# Patient Record
Sex: FEMALE | Race: BLACK OR AFRICAN AMERICAN | Employment: FULL TIME | RURAL
[De-identification: names, ages, dates, MRNs, and addresses within clinical notes are randomized per-mention and may not be internally consistent; named-entity substitution may affect disease eponyms.]

---

## 2017-01-03 RX ORDER — PRAVASTATIN SODIUM 10 MG/1
TABLET ORAL
Qty: 90 TAB | Refills: 1 | Status: SHIPPED | OUTPATIENT
Start: 2017-01-03 | End: 2017-08-21 | Stop reason: SDUPTHER

## 2017-03-14 NOTE — TELEPHONE ENCOUNTER
Please call patient to schedule office visit with labs has not been seen recently and is requesting refills.

## 2017-03-17 RX ORDER — AMLODIPINE BESYLATE AND BENAZEPRIL HYDROCHLORIDE 10; 40 MG/1; MG/1
CAPSULE ORAL
Qty: 90 CAP | Refills: 0 | Status: SHIPPED | OUTPATIENT
Start: 2017-03-17 | End: 2017-06-16 | Stop reason: SDUPTHER

## 2017-04-06 ENCOUNTER — TELEPHONE (OUTPATIENT)
Dept: FAMILY MEDICINE CLINIC | Age: 47
End: 2017-04-06

## 2017-04-06 NOTE — TELEPHONE ENCOUNTER
We have to move patient's appointment due to Marcelino Renteria being out. Would Dr. Ezequiel Lam allow us to use a \"z\" spot on Monday, April 10, at 4:30 p.m. to see this lady for medication refills? Patient just started a new job which starts at 7 a.m. and ends at 4:00 p.m.

## 2017-04-10 ENCOUNTER — OFFICE VISIT (OUTPATIENT)
Dept: FAMILY MEDICINE CLINIC | Age: 47
End: 2017-04-10

## 2017-04-10 VITALS
WEIGHT: 177.2 LBS | HEART RATE: 76 BPM | OXYGEN SATURATION: 99 % | SYSTOLIC BLOOD PRESSURE: 104 MMHG | RESPIRATION RATE: 16 BRPM | DIASTOLIC BLOOD PRESSURE: 70 MMHG | BODY MASS INDEX: 30.25 KG/M2 | HEIGHT: 64 IN

## 2017-04-10 DIAGNOSIS — G43.919 INTRACTABLE MIGRAINE, UNSPECIFIED MIGRAINE TYPE: Primary | ICD-10-CM

## 2017-04-10 RX ORDER — TOPIRAMATE 50 MG/1
TABLET, FILM COATED ORAL
Qty: 180 TAB | Refills: 0 | Status: SHIPPED | OUTPATIENT
Start: 2017-04-10 | End: 2017-07-28 | Stop reason: SDUPTHER

## 2017-05-15 RX ORDER — HYDROCHLOROTHIAZIDE 25 MG/1
25 TABLET ORAL DAILY
Qty: 90 TAB | Refills: 0 | Status: SHIPPED | OUTPATIENT
Start: 2017-05-15 | End: 2017-08-20 | Stop reason: SDUPTHER

## 2017-06-16 RX ORDER — AMLODIPINE BESYLATE AND BENAZEPRIL HYDROCHLORIDE 10; 40 MG/1; MG/1
CAPSULE ORAL
Qty: 90 CAP | Refills: 0 | Status: SHIPPED | OUTPATIENT
Start: 2017-06-16 | End: 2017-06-17 | Stop reason: SDUPTHER

## 2017-06-17 RX ORDER — AMLODIPINE BESYLATE AND BENAZEPRIL HYDROCHLORIDE 10; 40 MG/1; MG/1
CAPSULE ORAL
Qty: 90 CAP | Refills: 0 | Status: SHIPPED | OUTPATIENT
Start: 2017-06-17 | End: 2018-03-26 | Stop reason: SDUPTHER

## 2017-06-20 RX ORDER — AMLODIPINE BESYLATE 10 MG/1
10 TABLET ORAL DAILY
Qty: 90 TAB | Refills: 3 | OUTPATIENT
Start: 2017-06-20

## 2017-06-20 RX ORDER — AMLODIPINE BESYLATE AND BENAZEPRIL HYDROCHLORIDE 10; 40 MG/1; MG/1
CAPSULE ORAL
Qty: 90 CAP | Refills: 0 | OUTPATIENT
Start: 2017-06-20

## 2017-06-20 RX ORDER — BENAZEPRIL HYDROCHLORIDE 40 MG/1
40 TABLET ORAL DAILY
Qty: 90 TAB | Refills: 3 | OUTPATIENT
Start: 2017-06-20

## 2017-07-28 DIAGNOSIS — G43.919 INTRACTABLE MIGRAINE, UNSPECIFIED MIGRAINE TYPE: ICD-10-CM

## 2017-07-28 RX ORDER — TOPIRAMATE 50 MG/1
TABLET, FILM COATED ORAL
Qty: 180 TAB | Refills: 0 | Status: SHIPPED | OUTPATIENT
Start: 2017-07-28 | End: 2017-11-06 | Stop reason: SDUPTHER

## 2017-08-21 RX ORDER — PRAVASTATIN SODIUM 10 MG/1
TABLET ORAL
Qty: 30 TAB | Refills: 0 | Status: SHIPPED | OUTPATIENT
Start: 2017-08-21 | End: 2017-09-26 | Stop reason: SDUPTHER

## 2017-09-26 ENCOUNTER — OFFICE VISIT (OUTPATIENT)
Dept: FAMILY MEDICINE CLINIC | Age: 47
End: 2017-09-26

## 2017-09-26 VITALS
BODY MASS INDEX: 30.67 KG/M2 | OXYGEN SATURATION: 99 % | SYSTOLIC BLOOD PRESSURE: 120 MMHG | DIASTOLIC BLOOD PRESSURE: 70 MMHG | WEIGHT: 179.65 LBS | HEART RATE: 83 BPM | HEIGHT: 64 IN | RESPIRATION RATE: 16 BRPM | TEMPERATURE: 98.8 F

## 2017-09-26 DIAGNOSIS — R68.89 FLU-LIKE SYMPTOMS: Primary | ICD-10-CM

## 2017-09-26 LAB
QUICKVUE INFLUENZA TEST: NEGATIVE
VALID INTERNAL CONTROL?: YES

## 2017-09-26 RX ORDER — SCOLOPAMINE TRANSDERMAL SYSTEM 1 MG/1
1.5 PATCH, EXTENDED RELEASE TRANSDERMAL
Qty: 6 PATCH | Refills: 1 | Status: SHIPPED | OUTPATIENT
Start: 2017-09-26 | End: 2017-11-21 | Stop reason: ALTCHOICE

## 2017-09-26 RX ORDER — PRAVASTATIN SODIUM 10 MG/1
TABLET ORAL
Qty: 30 TAB | Refills: 0 | Status: SHIPPED | OUTPATIENT
Start: 2017-09-26 | End: 2017-11-06 | Stop reason: SDUPTHER

## 2017-09-26 NOTE — LETTER
NOTIFICATION RETURN TO WORK / SCHOOL 2017 Re: Lovely Lopez : 1970 To Whom It May Concern: 
 
Angela Jj is currently under the care of [address] [place] [address]. She is excused from work Monday, 2017, through Wednesday, 2017. If there are questions or concerns please have the patient contact our office.  
 
Sincerely, 
 
 
Emani Calle NP

## 2017-09-26 NOTE — MR AVS SNAPSHOT
Visit Information Date & Time Provider Department Dept. Phone Encounter #  
 9/26/2017 11:00 AM Mario Alberto Garcia, NP [address] [phone] [id number] Upcoming Health Maintenance Date Due Pneumococcal 19-64 Medium Risk (1 of 1 - PPSV23) 9/23/1990 INFLUENZA AGE 9 TO ADULT 8/1/2017 DTaP/Tdap/Td series (2 - Td) 5/20/2026 Allergies as of 9/26/2017  Review Complete On: 9/26/2017 By: Carie Carter RN Severity Noted Reaction Type Reactions Dilaudid [Hydromorphone (Bulk)] High 07/18/2014   Systemic Unknown (comments) Respiratory suppression leading to intubation Current Immunizations  Reviewed on 5/20/2016 Name Date Influenza Vaccine  Deferred (Patient Refused) Tdap 5/20/2016 Not reviewed this visit You Were Diagnosed With   
  
 Codes Comments Flu-like symptoms    -  Primary ICD-10-CM: R68.89 ICD-9-CM: 780.99 Vitals BP Pulse Temp Resp Height(growth percentile) Weight(growth percentile) 120/70 (BP 1 Location: Left arm, BP Patient Position: Sitting) 83 98.8 °F (37.1 °C) (Oral) 16 5' 4\" (1.626 m) 179 lb 10.4 oz (81.5 kg) SpO2 BMI OB Status Smoking Status 99% 30.84 kg/m2 Hysterectomy Current Every Day Smoker Vitals History BMI and BSA Data Body Mass Index Body Surface Area  
 30.84 kg/m 2 1.92 m 2 Preferred Pharmacy Pharmacy Name Phone [facility] [place] , [place] - [address] [phone] Your Updated Medication List  
  
   
This list is accurate as of: 9/26/17 12:53 PM.  Always use your most recent med list. amLODIPine-benazepril 10-40 mg per capsule Commonly known as:  LOTREL  
TAKE ONE CAPSULE BY MOUTH ONCE DAILY  
  
 aspirin 81 mg chewable tablet Take 1 Tab by mouth daily. CENTRUM PO Take 1 Tab by mouth daily. hydroCHLOROthiazide 25 mg tablet Commonly known as:  HYDRODIURIL  
 TAKE ONE TABLET BY MOUTH ONCE DAILY **NEEDS  TO  BE  RECHECK**  
  
 levothyroxine 100 mcg tablet Commonly known as:  SYNTHROID Take 1 Tab by mouth Daily (before breakfast). metFORMIN  mg tablet Commonly known as:  GLUCOPHAGE XR  
  
 naproxen sodium 220 mg Cap Take 220 Caps by mouth daily as needed. potassium chloride SR 10 mEq tablet Commonly known as:  KLOR-CON 10 Take 1 Tab by mouth two (2) times a day. pravastatin 10 mg tablet Commonly known as:  PRAVACHOL  
TAKE ONE TABLET BY MOUTH ONCE DAILY topiramate 50 mg tablet Commonly known as:  TOPAMAX TAKE ONE TABLET BY MOUTH TWICE DAILY, may taper to half a tablet bid for 1 mo, the half a tablet at night for 1 mo then stop VITAMIN D3 2,000 unit Tab Generic drug:  cholecalciferol (vitamin D3) Take 2 Caps by mouth. Prescriptions Sent to Pharmacy Refills  
 pravastatin (PRAVACHOL) 10 mg tablet 0 Sig: TAKE ONE TABLET BY MOUTH ONCE DAILY Class: Normal  
 Pharmacy: [address] [facility], [address] [id number] [address] Ph #: [phone] We Performed the Following AMB POC RAPID INFLUENZA TEST [00246 CPT(R)] Introducing [facility] & HEALTH SERVICES! [facility] introduces Social Studios patient portal. Now you can access parts of your medical record, email your doctor's office, and request medication refills online. 1. In your internet browser, go to [email or web address] 2. Click on the First Time User? Click Here link in the Sign In box. You will see the New Member Sign Up page. 3. Enter your Social Studios Access Code exactly as it appears below. You will not need to use this code after youve completed the sign-up process. If you do not sign up before the expiration date, you must request a new code. · Social Studios Access Code: [email or web address] Expires: 12/25/2017 12:53 PM 
 
4.  Enter the last four digits of your Social Security Number (xxxx) and Date of Birth (mm/dd/yyyy) as indicated and click Submit. You will be taken to the next sign-up page. 5. Create a Black coin ID. This will be your Black coin login ID and cannot be changed, so think of one that is secure and easy to remember. 6. Create a Black coin password. You can change your password at any time. 7. Enter your Password Reset Question and Answer. This can be used at a later time if you forget your password. 8. Enter your e-mail address. You will receive e-mail notification when new information is available in [address] 9. Click Sign Up. You can now view and download portions of your medical record. 10. Click the Download Summary menu link to download a portable copy of your medical information. If you have questions, please visit the Frequently Asked Questions section of the Black coin website. Remember, Black coin is NOT to be used for urgent needs. For medical emergencies, dial 911. Now available from your iPhone and Android! Please provide this summary of care documentation to your next provider. Your primary care clinician is listed as Zeinab Bolaños. If you have any questions after today's visit, please call [phone].

## 2017-09-27 ENCOUNTER — TELEPHONE (OUTPATIENT)
Dept: FAMILY MEDICINE CLINIC | Age: 47
End: 2017-09-27

## 2017-09-27 NOTE — PROGRESS NOTES
Subjective:     Yakov Meeks is a 56 y.o. female who presents today with the following:  Chief Complaint   Patient presents with    Sinus Infection     Priscilla Bone presents for an acute visit. 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 Started feeling ill over the weekend. Took 2 doses of Corcidin . Feeling hot and cold with body aches and headache. Going on a cruise next month. Requesting scopolamine patches. COMPLIANT WITH MEDICATION:   HTN; Denies chest pain, dyspnea, palpitations, headache and blurred vision. Blood pressure normotensive. ROS:  Gen: positive  fever, chills, fatigue,negative  weight loss, weight gain  HEENT:denies blurry vision, positive nasal congestion, sore throat  Resp: denies dypsnea, cough, wheezing  CV: denies chest pain radiating to the jaws or arms, palpitations, lower extremity edema  Abd: denies nausea, vomiting, diarrhea, constipation  Neuro: denies numbness/tingling  Endo: denies polyuria, polydipsia, heat/cold intolerance  Heme: no lymphadenopathy    Allergies   Allergen Reactions    Dilaudid [Hydromorphone (Bulk)] Unknown (comments)     Respiratory suppression leading to intubation         Current Outpatient Prescriptions:     pravastatin (PRAVACHOL) 10 mg tablet, TAKE ONE TABLET BY MOUTH ONCE DAILY, Disp: 30 Tab, Rfl: 0    scopolamine (TRANSDERM-SCOP) 1.5 mg (1 mg over 3 days) pt3d, 1 Patch by TransDERmal route every seventy-two (72) hours. Apply behind the ear on a hairless area of the neck.   Indications: PREVENTION OF MOTION SICKNESS, Disp: 6 Patch, Rfl: 1    hydroCHLOROthiazide (HYDRODIURIL) 25 mg tablet, TAKE ONE TABLET BY MOUTH ONCE DAILY **NEEDS  TO  BE  RECHECK**, Disp: 30 Tab, Rfl: 0    topiramate (TOPAMAX) 50 mg tablet, TAKE ONE TABLET BY MOUTH TWICE DAILY, may taper to half a tablet bid for 1 mo, the half a tablet at night for 1 mo then stop, Disp: 180 Tab, Rfl: 0    amLODIPine-benazepril (LOTREL) 10-40 mg per capsule, TAKE ONE CAPSULE BY MOUTH ONCE DAILY, Disp: 90 Cap, Rfl: 0    naproxen sodium 220 mg cap, Take 220 Caps by mouth daily as needed. , Disp: , Rfl:     potassium chloride SR (KLOR-CON 10) 10 mEq tablet, Take 1 Tab by mouth two (2) times a day., Disp: 180 Tab, Rfl: 1    metFORMIN ER (GLUCOPHAGE XR) 500 mg tablet, , Disp: , Rfl: 0    levothyroxine (SYNTHROID) 100 mcg tablet, Take 1 Tab by mouth Daily (before breakfast). , Disp: 14 Tab, Rfl: 0    aspirin 81 mg chewable tablet, Take 1 Tab by mouth daily. , Disp: 30 Tab, Rfl: 11    cholecalciferol, vitamin D3, (VITAMIN D3) 2,000 unit Tab, Take 2 Caps by mouth., Disp: , Rfl:     FOLIC ACID/MV,FE,OTHER MIN (CENTRUM PO), Take 1 Tab by mouth daily. , Disp: , Rfl:     Past Medical History:   Diagnosis Date    CAD (coronary artery disease) 10/2013    Hypertension     Hyperthyroidism     Hypokalemia     Other ill-defined conditions     MIGRAINE SUÁREZ,     Thyroid disease     GRAVES    Unspecified sleep apnea     DOES NOT USE C-PAP HAS AN ORDER FOR IT    Vitamin D deficiency        Past Surgical History:   Procedure Laterality Date    CARDIAC SURG PROCEDURE UNLIST  10-13    CARDIAC CATH    HX HEENT      Radioactive thyroid    HX HYSTERECTOMY      PARTIAL    HX HYSTERECTOMY  1/24/14    BSO       History   Smoking Status    Current Every Day Smoker    Packs/day: 1.00    Years: 10.00    Last attempt to quit: 10/12/2013   Smokeless Tobacco    Never Used       Social History     Social History    Marital status: SINGLE     Spouse name: N/A    Number of children: N/A    Years of education: N/A     Social History Main Topics    Smoking status: Current Every Day Smoker     Packs/day: 1.00     Years: 10.00     Last attempt to quit: 10/12/2013    Smokeless tobacco: Never Used    Alcohol use 0.0 oz/week     0 Standard drinks or equivalent per week    Drug use: No    Sexual activity: Not Asked     Other Topics Concern    None     Social History Narrative       Family History   Problem Relation Age of Onset    Kidney Disease Mother     Hypertension Mother     Diabetes Mother     Heart Disease Mother     Cancer Father      Brain    Heart Disease Brother     Hypertension Brother     Diabetes Maternal Grandmother     Cancer Maternal Grandmother     Heart Disease Maternal Grandmother     Hypertension Brother     Hypertension Sister     Diabetes Sister     Hypertension Sister     Hypertension Sister          Objective:     Visit Vitals    /70 (BP 1 Location: Left arm, BP Patient Position: Sitting)    Pulse 83    Temp 98.8 °F (37.1 °C) (Oral)    Resp 16    Ht 5' 4\" (1.626 m)    Wt 179 lb 10.4 oz (81.5 kg)    SpO2 99%    BMI 30.84 kg/m2     Body mass index is 30.84 kg/(m^2). General:Alert and oriented. Ill appearing. Well nourished  HEENT :Maxillary tenderness to palpation bilaterally  Ears:TMs are normal. Canals are clear. Eyes: pupils equal, round, react to light and accommodation. Extra ocular movements intact. Nose: patent. Mouth and throat is clear. Neck:supple full range of motion no thyromegaly. Trachea midline, No carotid bruits. No significant lymphadenopathy  Lungs[de-identified] clear to auscultation without wheezes, rales, or rhonchi. Heart :RRR, S1 & S2 are normal intensity. No murmur; no gallop  Abdomen: bowel sounds active. No tenderness, guarding, rebound, masses, hepatic or spleen enlargement  l    Results for orders placed or performed in visit on 09/26/17   AMB POC RAPID INFLUENZA TEST   Result Value Ref Range    VALID INTERNAL CONTROL POC Yes     QuickVue Influenza test Negative Negative       Results for orders placed or performed in visit on 09/26/17   AMB POC RAPID INFLUENZA TEST   Result Value Ref Range    VALID INTERNAL CONTROL POC Yes     QuickVue Influenza test Negative Negative       Assessment/ Plan:     Diagnoses and all orders for this visit:    1.  Flu-like symptoms  -     AMB POC RAPID INFLUENZA TEST    Other orders  -     pravastatin (PRAVACHOL) 10 mg tablet; TAKE ONE TABLET BY MOUTH ONCE DAILY  -     scopolamine (TRANSDERM-SCOP) 1.5 mg (1 mg over 3 days) pt3d; 1 Patch by TransDERmal route every seventy-two (72) hours. Apply behind the ear on a hairless area of the neck. Indications: PREVENTION OF MOTION SICKNESS         1. Flu-like symptoms    Sinusitis    Orders Placed This Encounter    AMB POC RAPID INFLUENZA TEST    pravastatin (PRAVACHOL) 10 mg tablet     Sig: TAKE ONE TABLET BY MOUTH ONCE DAILY     Dispense:  30 Tab     Refill:  0     Pt needs recheck!  scopolamine (TRANSDERM-SCOP) 1.5 mg (1 mg over 3 days) pt3d     Si Patch by TransDERmal route every seventy-two (72) hours. Apply behind the ear on a hairless area of the neck. Indications: PREVENTION OF MOTION SICKNESS     Dispense:  6 Patch     Refill:  1     WMKM     RTO :prn  if symptoms persist or worsen    Verbal and written instructions (see AVS) provided.  Patient expresses understanding of diagnosis and treatment plan.     SPENSER Milligan

## 2017-09-27 NOTE — TELEPHONE ENCOUNTER
Patient came into office needs another note for work . Needs to say can return to work without restrictions full duty.   She will come by first thing in am.

## 2017-10-20 ENCOUNTER — TELEPHONE (OUTPATIENT)
Dept: FAMILY MEDICINE CLINIC | Age: 47
End: 2017-10-20

## 2017-10-20 NOTE — TELEPHONE ENCOUNTER
Patient went to  the HCTZ but it had been restocked. She does not understand why she was only getting #15 and that she needed F/U.

## 2017-10-20 NOTE — TELEPHONE ENCOUNTER
She has not had BW done since 6/16 and that medication can bring Potassium down, it should be checked at least once a year

## 2017-10-20 NOTE — TELEPHONE ENCOUNTER
Patient has had labs drawn through her Endocrinologist on 7/13/2017 which is located in her Media file. Can she have her full dosage of HCTZ since she is current in her labs?

## 2017-11-02 ENCOUNTER — CLINICAL SUPPORT (OUTPATIENT)
Dept: FAMILY MEDICINE CLINIC | Age: 47
End: 2017-11-02

## 2017-11-02 DIAGNOSIS — E87.6 LOW SERUM POTASSIUM LEVEL: Primary | ICD-10-CM

## 2017-11-02 NOTE — MR AVS SNAPSHOT
Visit Information Date & Time Provider Department Dept. Phone Encounter #  
 11/2/2017  8:30 AM [facility] [phone] [id number] Upcoming Health Maintenance Date Due Pneumococcal 19-64 Medium Risk (1 of 1 - PPSV23) 9/23/1990 INFLUENZA AGE 9 TO ADULT 8/1/2017 DTaP/Tdap/Td series (2 - Td) 5/20/2026 Allergies as of 11/2/2017  Review Complete On: 9/26/2017 By: Edith Espinoza NP Severity Noted Reaction Type Reactions Dilaudid [Hydromorphone (Bulk)] High 07/18/2014   Systemic Unknown (comments) Respiratory suppression leading to intubation Current Immunizations  Reviewed on 5/20/2016 Name Date Influenza Vaccine  Deferred (Patient Refused) Tdap 5/20/2016 Not reviewed this visit Vitals OB Status Smoking Status Hysterectomy Current Every Day Smoker Preferred Pharmacy Pharmacy Name Phone [facility] [address], [place] - [address] [phone] Your Updated Medication List  
  
   
This list is accurate as of: 11/2/17  9:03 AM.  Always use your most recent med list. amLODIPine-benazepril 10-40 mg per capsule Commonly known as:  LOTREL  
TAKE ONE CAPSULE BY MOUTH ONCE DAILY  
  
 aspirin 81 mg chewable tablet Take 1 Tab by mouth daily. CENTRUM PO Take 1 Tab by mouth daily. hydroCHLOROthiazide 25 mg tablet Commonly known as:  HYDRODIURIL  
TAKE ONE TABLET BY MOUTH ONCE DAILY ***NEED  RECHECK***  
  
 levothyroxine 100 mcg tablet Commonly known as:  SYNTHROID Take 1 Tab by mouth Daily (before breakfast). metFORMIN  mg tablet Commonly known as:  GLUCOPHAGE XR  
  
 naproxen sodium 220 mg Cap Take 220 Caps by mouth daily as needed. potassium chloride SR 10 mEq tablet Commonly known as:  KLOR-CON 10 Take 1 Tab by mouth two (2) times a day. pravastatin 10 mg tablet Commonly known as:  PRAVACHOL  
TAKE ONE TABLET BY MOUTH ONCE DAILY  
  
 scopolamine 1 mg over 3 days Pt3d Commonly known as:  TRANSDERM-SCOP  
1 Patch by TransDERmal route every seventy-two (72) hours. Apply behind the ear on a hairless area of the neck. Indications: PREVENTION OF MOTION SICKNESS  
  
 topiramate 50 mg tablet Commonly known as:  TOPAMAX TAKE ONE TABLET BY MOUTH TWICE DAILY, may taper to half a tablet bid for 1 mo, the half a tablet at night for 1 mo then stop VITAMIN D3 2,000 unit Tab Generic drug:  cholecalciferol (vitamin D3) Take 2 Caps by mouth. Introducing [facility] & HEALTH SERVICES! [facility] introduces Smartesting patient portal. Now you can access parts of your medical record, email your doctor's office, and request medication refills online. 1. In your internet browser, go to [email or web address] 2. Click on the First Time User? Click Here link in the Sign In box. You will see the New Member Sign Up page. 3. Enter your Smartesting Access Code exactly as it appears below. You will not need to use this code after youve completed the sign-up process. If you do not sign up before the expiration date, you must request a new code. · Smartesting Access Code: [email or web address] Expires: 12/25/2017 12:53 PM 
 
4. Enter the last four digits of your Social Security Number (xxxx) and Date of Birth (mm/dd/yyyy) as indicated and click Submit. You will be taken to the next sign-up page. 5. Create a Balancedt ID. This will be your Smartesting login ID and cannot be changed, so think of one that is secure and easy to remember. 6. Create a Balancedt password. You can change your password at any time. 7. Enter your Password Reset Question and Answer. This can be used at a later time if you forget your password. 8. Enter your e-mail address. You will receive e-mail notification when new information is available in [address] 9. Click Sign Up. You can now view and download portions of your medical record. 10. Click the Download Summary menu link to download a portable copy of your medical information. If you have questions, please visit the Frequently Asked Questions section of the Cortex website. Remember, Cortex is NOT to be used for urgent needs. For medical emergencies, dial 911. Now available from your iPhone and Android! Please provide this summary of care documentation to your next provider. Your primary care clinician is listed as Kina Palmer. If you have any questions after today's visit, please call [phone].

## 2017-11-03 LAB — POTASSIUM SERPL-SCNC: 3.1 MMOL/L (ref 3.5–5.2)

## 2017-11-03 NOTE — PROGRESS NOTES
Patient notified by phone of lab results. She has been taking one KCl 10Meq daily. Advised her to take 2 daily and to eat a banana daily as well. She understands she is to see Alec PERALTA in 2 weeks to have another K(+) drawn.     Mireille Bunch

## 2017-11-06 DIAGNOSIS — G43.919 INTRACTABLE MIGRAINE, UNSPECIFIED MIGRAINE TYPE: ICD-10-CM

## 2017-11-06 RX ORDER — PRAVASTATIN SODIUM 10 MG/1
TABLET ORAL
Qty: 30 TAB | Refills: 0 | Status: SHIPPED | OUTPATIENT
Start: 2017-11-06 | End: 2017-12-04 | Stop reason: SDUPTHER

## 2017-11-06 RX ORDER — HYDROCHLOROTHIAZIDE 25 MG/1
TABLET ORAL
Qty: 15 TAB | Refills: 0 | Status: SHIPPED | OUTPATIENT
Start: 2017-11-06 | End: 2017-11-24 | Stop reason: SDUPTHER

## 2017-11-06 RX ORDER — TOPIRAMATE 50 MG/1
TABLET, FILM COATED ORAL
Qty: 180 TAB | Refills: 0 | Status: SHIPPED | OUTPATIENT
Start: 2017-11-06 | End: 2018-02-08 | Stop reason: SDUPTHER

## 2017-11-17 ENCOUNTER — LAB ONLY (OUTPATIENT)
Dept: FAMILY MEDICINE CLINIC | Age: 47
End: 2017-11-17

## 2017-11-17 DIAGNOSIS — E03.9 HYPOTHYROIDISM, UNSPECIFIED TYPE: ICD-10-CM

## 2017-11-17 DIAGNOSIS — R73.03 PRE-DIABETES: ICD-10-CM

## 2017-11-17 DIAGNOSIS — I10 ESSENTIAL HYPERTENSION: ICD-10-CM

## 2017-11-17 DIAGNOSIS — E78.5 HYPERLIPIDEMIA, UNSPECIFIED HYPERLIPIDEMIA TYPE: ICD-10-CM

## 2017-11-17 DIAGNOSIS — I10 ESSENTIAL HYPERTENSION: Primary | ICD-10-CM

## 2017-11-17 NOTE — MR AVS SNAPSHOT
Visit Information Date & Time Provider Department Dept. Phone Encounter #  
 11/17/2017 11:45 AM [facility] [address] [phone] [id number] Your Appointments 11/17/2017 11:45 AM  
LAB with [address] ([address]) Appt Note: FBW for ARCADIO Segundo [address] [address]  
[phone]  
  
   
 [address] [address]  
  
    
 11/21/2017  3:00 PM  
ESTABLISHED PATIENT with Ping Sylvester NP  
[address] [place] ([address]) Appt Note: [address] [address]  
[address] [facility] [address] [address] Upcoming Health Maintenance Date Due Pneumococcal 19-64 Medium Risk (1 of 1 - PPSV23) 9/23/1990 Influenza Age 5 to Adult 8/1/2017 DTaP/Tdap/Td series (2 - Td) 5/20/2026 Allergies as of 11/17/2017  Review Complete On: 9/26/2017 By: Ping Sylvester NP Severity Noted Reaction Type Reactions Dilaudid [Hydromorphone (Bulk)] High 07/18/2014   Systemic Unknown (comments) Respiratory suppression leading to intubation Current Immunizations  Reviewed on 5/20/2016 Name Date Influenza Vaccine  Deferred (Patient Refused) Tdap 5/20/2016 Not reviewed this visit You Were Diagnosed With   
  
 Codes Comments Hyperlipidemia, unspecified hyperlipidemia type     ICD-10-CM: E78.5 ICD-9-CM: 272.4 Essential hypertension     ICD-10-CM: I10 
ICD-9-CM: 401.9 Hypothyroidism, unspecified type     ICD-10-CM: E03.9 ICD-9-CM: 244.9 Pre-diabetes     ICD-10-CM: R73.03 
ICD-9-CM: 790.29 Vitals OB Status Smoking Status Hysterectomy Current Every Day Smoker Preferred Pharmacy Pharmacy Name Phone  [facility] [address] [place] [address] [address] [phone] Your Updated Medication List  
  
   
This list is accurate as of: 11/17/17 11:36 AM.  Always use your most recent med list. amLODIPine-benazepril 10-40 mg per capsule Commonly known as:  LOTREL  
TAKE ONE CAPSULE BY MOUTH ONCE DAILY  
  
 aspirin 81 mg chewable tablet Take 1 Tab by mouth daily. CENTRUM PO Take 1 Tab by mouth daily. hydroCHLOROthiazide 25 mg tablet Commonly known as:  HYDRODIURIL  
TAKE ONE TABLET BY MOUTH ONCE DAILY ***NEED  RECHECK***  
  
 levothyroxine 100 mcg tablet Commonly known as:  SYNTHROID Take 1 Tab by mouth Daily (before breakfast). metFORMIN  mg tablet Commonly known as:  GLUCOPHAGE XR  
  
 naproxen sodium 220 mg Cap Take 220 Caps by mouth daily as needed. potassium chloride SR 10 mEq tablet Commonly known as:  KLOR-CON 10 Take 1 Tab by mouth two (2) times a day. pravastatin 10 mg tablet Commonly known as:  PRAVACHOL  
TAKE ONE TABLET BY MOUTH ONCE DAILY, NEED RECHECK  
  
 scopolamine 1 mg over 3 days Pt3d Commonly known as:  TRANSDERM-SCOP  
1 Patch by TransDERmal route every seventy-two (72) hours. Apply behind the ear on a hairless area of the neck. Indications: PREVENTION OF MOTION SICKNESS  
  
 topiramate 50 mg tablet Commonly known as:  TOPAMAX TAKE ONE TABLET BY MOUTH TWICE DAILY, may taper to half a tablet bid for 1 mo, the half a tablet at night for 1 mo then stop VITAMIN D3 2,000 unit Tab Generic drug:  cholecalciferol (vitamin D3) Take 2 Caps by mouth. We Performed the Following COLLECTION VENOUS BLOOD,VENIPUNCTURE R6181892 CPT(R)] LIPID PANEL WITH LDL/HDL RATIO [19924 CPT(R)] METABOLIC PANEL, COMPREHENSIVE [87633 CPT(R)] T3, FREE [id number] CPT(R)] T4 (THYROXINE) [id number] CPT(R)] Introducing [facility] & HEALTH SERVICES!    
 [facility]au[facility]inal[facility] introduces PadProof patient portal. Now you can access parts of your medical record, email your doctor's office, and request medication refills online. 1. In your internet browser, go to [email or web address] 2. Click on the First Time User? Click Here link in the Sign In box. You will see the New Member Sign Up page. 3. Enter your Weaver Express Access Code exactly as it appears below. You will not need to use this code after youve completed the sign-up process. If you do not sign up before the expiration date, you must request a new code. · Weaver Express Access Code: [email or web address] Expires: 12/25/2017 11:53 AM 
 
4. Enter the last four digits of your Social Security Number (xxxx) and Date of Birth (mm/dd/yyyy) as indicated and click Submit. You will be taken to the next sign-up page. 5. Create a Weaver Express ID. This will be your Weaver Express login ID and cannot be changed, so think of one that is secure and easy to remember. 6. Create a Weaver Express password. You can change your password at any time. 7. Enter your Password Reset Question and Answer. This can be used at a later time if you forget your password. 8. Enter your e-mail address. You will receive e-mail notification when new information is available in [address] 9. Click Sign Up. You can now view and download portions of your medical record. 10. Click the Download Summary menu link to download a portable copy of your medical information. If you have questions, please visit the Frequently Asked Questions section of the Weaver Express website. Remember, Weaver Express is NOT to be used for urgent needs. For medical emergencies, dial 911. Now available from your iPhone and Android! Please provide this summary of care documentation to your next provider. Your primary care clinician is listed as Howie Pedro. If you have any questions after today's visit, please call [phone].

## 2017-11-18 LAB
ALBUMIN SERPL-MCNC: 4.7 G/DL (ref 3.5–5.5)
ALBUMIN/GLOB SERPL: 1.9 {RATIO} (ref 1.2–2.2)
ALP SERPL-CCNC: 73 IU/L (ref 39–117)
ALT SERPL-CCNC: 15 IU/L (ref 0–32)
AST SERPL-CCNC: 18 IU/L (ref 0–40)
BILIRUB SERPL-MCNC: <0.2 MG/DL (ref 0–1.2)
BUN SERPL-MCNC: 28 MG/DL (ref 6–24)
BUN/CREAT SERPL: 37 (ref 9–23)
CALCIUM SERPL-MCNC: 9.8 MG/DL (ref 8.7–10.2)
CHLORIDE SERPL-SCNC: 103 MMOL/L (ref 96–106)
CHOLEST SERPL-MCNC: 179 MG/DL (ref 100–199)
CO2 SERPL-SCNC: 21 MMOL/L (ref 18–29)
CREAT SERPL-MCNC: 0.75 MG/DL (ref 0.57–1)
ERYTHROCYTE [DISTWIDTH] IN BLOOD BY AUTOMATED COUNT: 14.1 % (ref 12.3–15.4)
EST. AVERAGE GLUCOSE BLD GHB EST-MCNC: 108 MG/DL
GFR SERPLBLD CREATININE-BSD FMLA CKD-EPI: 111 ML/MIN/1.73
GFR SERPLBLD CREATININE-BSD FMLA CKD-EPI: 96 ML/MIN/1.73
GLOBULIN SER CALC-MCNC: 2.5 G/DL (ref 1.5–4.5)
GLUCOSE SERPL-MCNC: 84 MG/DL (ref 65–99)
HBA1C MFR BLD: 5.4 % (ref 4.8–5.6)
HCT VFR BLD AUTO: 38.4 % (ref 34–46.6)
HDLC SERPL-MCNC: 66 MG/DL
HGB BLD-MCNC: 12.8 G/DL (ref 11.1–15.9)
LDLC SERPL CALC-MCNC: 102 MG/DL (ref 0–99)
LDLC/HDLC SERPL: 1.5 RATIO UNITS (ref 0–3.2)
MCH RBC QN AUTO: 31.1 PG (ref 26.6–33)
MCHC RBC AUTO-ENTMCNC: 33.3 G/DL (ref 31.5–35.7)
MCV RBC AUTO: 93 FL (ref 79–97)
PLATELET # BLD AUTO: 314 X10E3/UL (ref 150–379)
POTASSIUM SERPL-SCNC: 3.3 MMOL/L (ref 3.5–5.2)
PROT SERPL-MCNC: 7.2 G/DL (ref 6–8.5)
RBC # BLD AUTO: 4.12 X10E6/UL (ref 3.77–5.28)
SODIUM SERPL-SCNC: 143 MMOL/L (ref 134–144)
T3FREE SERPL-MCNC: 1.7 PG/ML (ref 2–4.4)
T4 SERPL-MCNC: 8.1 UG/DL (ref 4.5–12)
TRIGL SERPL-MCNC: 57 MG/DL (ref 0–149)
VLDLC SERPL CALC-MCNC: 11 MG/DL (ref 5–40)
WBC # BLD AUTO: 10.1 X10E3/UL (ref 3.4–10.8)

## 2017-11-21 ENCOUNTER — OFFICE VISIT (OUTPATIENT)
Dept: FAMILY MEDICINE CLINIC | Age: 47
End: 2017-11-21

## 2017-11-21 VITALS
DIASTOLIC BLOOD PRESSURE: 70 MMHG | HEART RATE: 85 BPM | SYSTOLIC BLOOD PRESSURE: 110 MMHG | BODY MASS INDEX: 31.72 KG/M2 | HEIGHT: 64 IN | TEMPERATURE: 97.8 F | WEIGHT: 185.8 LBS | OXYGEN SATURATION: 99 %

## 2017-11-21 DIAGNOSIS — R39.9 URINARY TRACT INFECTION SYMPTOMS: ICD-10-CM

## 2017-11-21 DIAGNOSIS — R10.9 FLANK PAIN: ICD-10-CM

## 2017-11-21 DIAGNOSIS — Z71.6 ENCOUNTER FOR SMOKING CESSATION COUNSELING: ICD-10-CM

## 2017-11-21 DIAGNOSIS — Z12.39 SCREENING FOR MALIGNANT NEOPLASM OF BREAST: ICD-10-CM

## 2017-11-21 LAB
BILIRUB UR QL STRIP: NEGATIVE
GLUCOSE UR-MCNC: NEGATIVE MG/DL
KETONES P FAST UR STRIP-MCNC: NEGATIVE MG/DL
PH UR STRIP: 6 [PH] (ref 4.6–8)
PROT UR QL STRIP: NEGATIVE
SP GR UR STRIP: 1.03 (ref 1–1.03)
UA UROBILINOGEN AMB POC: NORMAL (ref 0.2–1)
URINALYSIS CLARITY POC: CLEAR
URINALYSIS COLOR POC: YELLOW
URINE BLOOD POC: NEGATIVE
URINE LEUKOCYTES POC: NEGATIVE
URINE NITRITES POC: NEGATIVE

## 2017-11-21 RX ORDER — NITROFURANTOIN 25; 75 MG/1; MG/1
100 CAPSULE ORAL 2 TIMES DAILY
Qty: 10 CAP | Refills: 0 | Status: SHIPPED | OUTPATIENT
Start: 2017-11-21 | End: 2018-04-12 | Stop reason: ALTCHOICE

## 2017-11-21 NOTE — MR AVS SNAPSHOT
Visit Information Date & Time Provider Department Dept. Phone Encounter #  
 11/21/2017  3:00 PM Maxine Neely NP [address] [phone] [id number] Follow-up Instructions Return in about 2 months (around 1/21/2018). Upcoming Health Maintenance Date Due DTaP/Tdap/Td series (2 - Td) 5/20/2026 Allergies as of 11/21/2017  Review Complete On: 11/21/2017 By: Joe Grajeda RN Severity Noted Reaction Type Reactions Dilaudid [Hydromorphone (Bulk)] High 07/18/2014   Systemic Unknown (comments) Respiratory suppression leading to intubation Current Immunizations  Reviewed on 5/20/2016 Name Date Influenza Vaccine  Deferred (Patient Refused) Tdap 5/20/2016 Not reviewed this visit You Were Diagnosed With   
  
 Codes Comments BMI 31.0-31.9,adult    -  Primary ICD-10-CM: Z68.31 
ICD-9-CM: V85.31 Urinary tract infection symptoms     ICD-10-CM: R39.9 ICD-9-CM: 788.99 Flank pain     ICD-10-CM: R10.9 ICD-9-CM: 789.09 Screening for malignant neoplasm of breast     ICD-10-CM: Z12.31 
ICD-9-CM: V76.10 Vitals BP Pulse Temp Height(growth percentile) Weight(growth percentile) 110/70 (BP 1 Location: Left arm, BP Patient Position: Sitting) 85 97.8 °F (36.6 °C) (Temporal) 5' 4\" (1.626 m) 185 lb 12.8 oz (84.3 kg) SpO2 BMI OB Status Smoking Status 99% 31.89 kg/m2 Hysterectomy Current Every Day Smoker Vitals History BMI and BSA Data Body Mass Index Body Surface Area  
 31.89 kg/m 2 1.95 m 2 Preferred Pharmacy Pharmacy Name Phone [facility] [place] [address], [place] [address] [phone] Your Updated Medication List  
  
   
This list is accurate as of: 11/21/17  4:02 PM.  Always use your most recent med list. amLODIPine-benazepril 10-40 mg per capsule Commonly known as:  LOTREL  
TAKE ONE CAPSULE BY MOUTH ONCE DAILY aspirin 81 mg chewable tablet Take 1 Tab by mouth daily. CENTRUM PO Take 1 Tab by mouth daily. hydroCHLOROthiazide 25 mg tablet Commonly known as:  HYDRODIURIL  
TAKE ONE TABLET BY MOUTH ONCE DAILY ***NEED  RECHECK***  
  
 levothyroxine 100 mcg tablet Commonly known as:  SYNTHROID Take 1 Tab by mouth Daily (before breakfast). metFORMIN  mg tablet Commonly known as:  GLUCOPHAGE XR Takes 1 BID  
  
 naproxen sodium 220 mg Cap Take 220 Caps by mouth daily as needed. nitrofurantoin (macrocrystal-monohydrate) 100 mg capsule Commonly known as:  MACROBID Take 1 Cap by mouth two (2) times a day. Indications: BACTERIAL URINARY TRACT INFECTION  
  
 potassium chloride SR 10 mEq tablet Commonly known as:  KLOR-CON 10 Take 1 Tab by mouth two (2) times a day. pravastatin 10 mg tablet Commonly known as:  PRAVACHOL  
TAKE ONE TABLET BY MOUTH ONCE DAILY, NEED RECHECK  
  
 topiramate 50 mg tablet Commonly known as:  TOPAMAX TAKE ONE TABLET BY MOUTH TWICE DAILY, may taper to half a tablet bid for 1 mo, the half a tablet at night for 1 mo then stop VITAMIN D3 2,000 unit Tab Generic drug:  cholecalciferol (vitamin D3) Take 2 Caps by mouth. Prescriptions Sent to Pharmacy Refills  
 nitrofurantoin, macrocrystal-monohydrate, (MACROBID) 100 mg capsule 0 Sig: Take 1 Cap by mouth two (2) times a day. Indications: BACTERIAL URINARY TRACT INFECTION Class: Normal  
 Pharmacy: [facility] [address] [place] [address] [address] Ph #: [phone] Route: Oral  
  
We Performed the Following AMB POC URINALYSIS DIP STICK AUTO W/O MICRO [89359 CPT(R)] Follow-up Instructions Return in about 2 months (around 1/21/2018). Introducing [facility] & HE[facility] SERVICES! Abby Coreas introduces [employer] patient portal. Now you can access parts of your medical record, email your doctor's office, and request medication refills online. 1. In your internet browser, go to [email or web address] 2. Click on the First Time User? Click Here link in the Sign In box. You will see the New Member Sign Up page. 3. Enter your WorkThink Access Code exactly as it appears below. You will not need to use this code after youve completed the sign-up process. If you do not sign up before the expiration date, you must request a new code. · WorkThink Access Code: [email or web address] Expires: 12/25/2017 11:53 AM 
 
4. Enter the last four digits of your Social Security Number (xxxx) and Date of Birth (mm/dd/yyyy) as indicated and click Submit. You will be taken to the next sign-up page. 5. Create a Labrys Biologicst ID. This will be your WorkThink login ID and cannot be changed, so think of one that is secure and easy to remember. 6. Create a WorkThink password. You can change your password at any time. 7. Enter your Password Reset Question and Answer. This can be used at a later time if you forget your password. 8. Enter your e-mail address. You will receive e-mail notification when new information is available in [address] 9. Click Sign Up. You can now view and download portions of your medical record. 10. Click the Download Summary menu link to download a portable copy of your medical information. If you have questions, please visit the Frequently Asked Questions section of the WorkThink website. Remember, WorkThink is NOT to be used for urgent needs. For medical emergencies, dial 911. Now available from your iPhone and Android! Please provide this summary of care documentation to your next provider. Your primary care clinician is listed as Lois Hunter. If you have any questions after today's visit, please call [phone].

## 2017-11-24 RX ORDER — HYDROCHLOROTHIAZIDE 25 MG/1
TABLET ORAL
Qty: 15 TAB | Refills: 0 | Status: SHIPPED | OUTPATIENT
Start: 2017-11-24 | End: 2017-12-04 | Stop reason: SDUPTHER

## 2017-11-24 NOTE — PROGRESS NOTES
Lipid panel looks great  Metabolic panel: Potassium low supplement with foods high n potassium such as bananas and kiwi  T3 is low nd T$ WNL  Please send results to Dr. Beth Vela

## 2017-11-25 NOTE — PROGRESS NOTES
Subjective:     Franck Zhou is a 56 y.o. female who presents today with the following:  Chief Complaint   Patient presents with    Hypertension     checkup    Results     lab    Nicotine Dependence     Tyra Figueroa presents for a follow up for chronic disease management. Lab results reviewed T3 low--copy sent to Dr. Enrique Schmitz endocrinologist.   COMPLIANT WITH MEDICATION:   HTN; Denies chest pain, dyspnea, palpitations, headache and blurred vision. Blood pressure normotensive. BMI:Discussed the patient's BMI with her. The BMI follow up plan is as follows:     dietary management education, guidance, and counseling  encourage exercise  monitor weight  prescribed dietary intake    An After Visit Summary was printed and given to the patient. Smoking cessation : Current smoker 1 pack per day 10 year history. Discussed the benefits and risks of chantix, Zyban and gradual cutting back. She decided to try to cut out one cigarette per day  Each week. Plan to try Zyban if needed. Urinary symptoms:intermittent flank pain . drinks 1 to 2 glasses of fluis most days. Discussed the importance of 64 ounces of fluids per day. Check urinalysis today.          ROS:  Gen: denies fever, chills, fatigue, weight loss, weight gain  HEENT:denies blurry vision, nasal congestion, sore throat  Resp: denies dypsnea, cough, wheezing  CV: denies chest pain radiating to the jaws or arms, palpitations, lower extremity edema  Abd: denies nausea, vomiting, diarrhea, constipation  Neuro: denies numbness/tingling  Endo: denies polyuria, polydipsia, heat/cold intolerance  Heme: no lymphadenopathy    Allergies   Allergen Reactions    Dilaudid [Hydromorphone (Bulk)] Unknown (comments)     Respiratory suppression leading to intubation           Past Medical History:   Diagnosis Date    CAD (coronary artery disease) 10/2013    Hypertension     Hyperthyroidism     Hypokalemia     Other ill-defined conditions(293.86)     MIGRAINE SUÁREZ,  Thyroid disease     GRAVES    Unspecified sleep apnea     DOES NOT USE C-PAP HAS AN ORDER FOR IT    Vitamin D deficiency        Past Surgical History:   Procedure Laterality Date    CARDIAC SURG PROCEDURE UNLIST  10-13    CARDIAC CATH    HX HEENT      Radioactive thyroid    HX HYSTERECTOMY      PARTIAL    HX HYSTERECTOMY  1/24/14    BSO       History   Smoking Status    Current Every Day Smoker    Packs/day: 1.00    Years: 10.00    Last attempt to quit: 10/12/2013   Smokeless Tobacco    Never Used       Social History     Social History    Marital status: SINGLE     Spouse name: N/A    Number of children: N/A    Years of education: N/A     Social History Main Topics    Smoking status: Current Every Day Smoker     Packs/day: 1.00     Years: 10.00     Last attempt to quit: 10/12/2013    Smokeless tobacco: Never Used    Alcohol use 0.0 oz/week     0 Standard drinks or equivalent per week    Drug use: No    Sexual activity: Not Asked     Other Topics Concern    None     Social History Narrative       Family History   Problem Relation Age of Onset    Kidney Disease Mother     Hypertension Mother     Diabetes Mother     Heart Disease Mother     Cancer Father      Brain    Heart Disease Brother     Hypertension Brother     Diabetes Maternal Grandmother     Cancer Maternal Grandmother     Heart Disease Maternal Grandmother     Hypertension Brother     Hypertension Sister     Diabetes Sister     Hypertension Sister     Hypertension Sister          Objective:     Visit Vitals    /70 (BP 1 Location: Left arm, BP Patient Position: Sitting)    Pulse 85    Temp 97.8 °F (36.6 °C) (Temporal)    Ht 5' 4\" (1.626 m)    Wt 185 lb 12.8 oz (84.3 kg)    SpO2 99%    BMI 31.89 kg/m2     Body mass index is 31.89 kg/(m^2). General: Alert and oriented. No acute distress. Well nourished  HEENT :  Ears:TMs are normal. Canals are clear.    Eyes: pupils equal, round, react to light and accommodation. Extra ocular movements intact. Nose: patent. Mouth and throat is clear. Neck:supple full range of motion no thyromegaly. Trachea midline, No carotid bruits. No significant lymphadenopathy  Lungs[de-identified] clear to auscultation without wheezes, rales, or rhonchi. Heart :RRR, S1 & S2 are normal intensity. No murmur; no gallop  Abdomen: bowel sounds active. No tenderness, guarding, rebound, masses, hepatic or spleen enlargement  Back: positive CVA tenderness. Extremities: without clubbing, cyanosis, or edema  Pulses: radial and femoral pulses are normal  Neuro: HMF intact. Cranial nerves II through XII grossly normal.  Motor: is 5 over 5 and symmetrical.   Deep tendon reflexes: +2 equal    Results for orders placed or performed in visit on 11/21/17   AMB POC URINALYSIS DIP STICK AUTO W/O MICRO   Result Value Ref Range    Color (UA POC) Yellow     Clarity (UA POC) Clear     Glucose (UA POC) Negative Negative    Bilirubin (UA POC) Negative Negative    Ketones (UA POC) Negative Negative    Specific gravity (UA POC) 1.030 1.001 - 1.035    Blood (UA POC) Negative Negative    pH (UA POC) 6.0 4.6 - 8.0    Protein (UA POC) Negative Negative    Urobilinogen (UA POC) 1 mg/dL 0.2 - 1    Nitrites (UA POC) Negative Negative    Leukocyte esterase (UA POC) Negative Negative       Results for orders placed or performed in visit on 11/21/17   AMB POC URINALYSIS DIP STICK AUTO W/O MICRO   Result Value Ref Range    Color (UA POC) Yellow     Clarity (UA POC) Clear     Glucose (UA POC) Negative Negative    Bilirubin (UA POC) Negative Negative    Ketones (UA POC) Negative Negative    Specific gravity (UA POC) 1.030 1.001 - 1.035    Blood (UA POC) Negative Negative    pH (UA POC) 6.0 4.6 - 8.0    Protein (UA POC) Negative Negative    Urobilinogen (UA POC) 1 mg/dL 0.2 - 1    Nitrites (UA POC) Negative Negative    Leukocyte esterase (UA POC) Negative Negative       Assessment/ Plan:     Diagnoses and all orders for this visit:    1. BMI 31.0-31.9,adult    2. Urinary tract infection symptoms  -     AMB POC URINALYSIS DIP STICK AUTO W/O MICRO    3. Flank pain    4. Screening for malignant neoplasm of breast  -     LEIGHANN MAMMOGRAM SCREENING DIGITAL BILAT; Future    5. Encounter for smoking cessation counseling    Other orders  -     nitrofurantoin, macrocrystal-monohydrate, (MACROBID) 100 mg capsule; Take 1 Cap by mouth two (2) times a day. Indications: BACTERIAL URINARY TRACT INFECTION         1. BMI 31.0-31.9,adult    2. Urinary tract infection symptoms    3. Flank pain    4. Screening for malignant neoplasm of breast    5. Encounter for smoking cessation counseling        Orders Placed This Encounter    LEIGHANN MAMMOGRAM SCREENING DIGITAL BILAT     Standing Status:   Future     Standing Expiration Date:   12/21/2018     Order Specific Question:   Reason for Exam     Answer:   Breast cancer screening     Order Specific Question:   Is Patient Pregnant? Answer:   No    AMB POC URINALYSIS DIP STICK AUTO W/O MICRO    nitrofurantoin, macrocrystal-monohydrate, (MACROBID) 100 mg capsule     Sig: Take 1 Cap by mouth two (2) times a day. Indications: BACTERIAL URINARY TRACT INFECTION     Dispense:  10 Cap     Refill:  0         Verbal and written instructions (see AVS) provided.  Patient expresses understanding of diagnosis and treatment plan. Follow-up Disposition:  Return in about 2 months (around 1/21/2018).       SPENSER Nugent

## 2017-12-04 ENCOUNTER — TELEPHONE (OUTPATIENT)
Dept: FAMILY MEDICINE CLINIC | Age: 47
End: 2017-12-04

## 2017-12-04 RX ORDER — HYDROCHLOROTHIAZIDE 25 MG/1
TABLET ORAL
Qty: 30 TAB | Refills: 3 | Status: SHIPPED | OUTPATIENT
Start: 2017-12-04 | End: 2018-04-10 | Stop reason: SDUPTHER

## 2017-12-04 RX ORDER — HYDROCHLOROTHIAZIDE 25 MG/1
TABLET ORAL
Qty: 15 TAB | Refills: 0 | Status: CANCELLED | OUTPATIENT
Start: 2017-12-04

## 2017-12-04 RX ORDER — PRAVASTATIN SODIUM 10 MG/1
TABLET ORAL
Qty: 30 TAB | Refills: 0 | Status: SHIPPED | OUTPATIENT
Start: 2017-12-04 | End: 2018-01-06 | Stop reason: SDUPTHER

## 2017-12-19 RX ORDER — AMLODIPINE BESYLATE AND BENAZEPRIL HYDROCHLORIDE 10; 40 MG/1; MG/1
CAPSULE ORAL
Qty: 90 CAP | Refills: 0 | Status: SHIPPED | OUTPATIENT
Start: 2017-12-19 | End: 2018-03-26 | Stop reason: SDUPTHER

## 2018-01-09 RX ORDER — PRAVASTATIN SODIUM 10 MG/1
TABLET ORAL
Qty: 30 TAB | Refills: 0 | Status: SHIPPED | OUTPATIENT
Start: 2018-01-09 | End: 2018-02-26 | Stop reason: SDUPTHER

## 2018-02-08 DIAGNOSIS — G43.919 INTRACTABLE MIGRAINE, UNSPECIFIED MIGRAINE TYPE: ICD-10-CM

## 2018-02-08 RX ORDER — TOPIRAMATE 50 MG/1
TABLET, FILM COATED ORAL
Qty: 180 TAB | Refills: 0 | Status: SHIPPED | OUTPATIENT
Start: 2018-02-08 | End: 2018-02-09 | Stop reason: SDUPTHER

## 2018-02-09 DIAGNOSIS — G43.919 INTRACTABLE MIGRAINE, UNSPECIFIED MIGRAINE TYPE: ICD-10-CM

## 2018-02-09 RX ORDER — TOPIRAMATE 50 MG/1
TABLET, FILM COATED ORAL
Qty: 180 TAB | Refills: 0 | Status: SHIPPED | OUTPATIENT
Start: 2018-02-09 | End: 2018-09-25

## 2018-02-27 RX ORDER — PRAVASTATIN SODIUM 10 MG/1
TABLET ORAL
Qty: 30 TAB | Refills: 0 | Status: SHIPPED | OUTPATIENT
Start: 2018-02-27 | End: 2018-03-26 | Stop reason: SDUPTHER

## 2018-03-26 RX ORDER — PRAVASTATIN SODIUM 10 MG/1
TABLET ORAL
Qty: 30 TAB | Refills: 0 | Status: SHIPPED | OUTPATIENT
Start: 2018-03-26 | End: 2018-04-27 | Stop reason: SDUPTHER

## 2018-03-26 RX ORDER — AMLODIPINE BESYLATE AND BENAZEPRIL HYDROCHLORIDE 10; 40 MG/1; MG/1
CAPSULE ORAL
Qty: 90 CAP | Refills: 0 | Status: SHIPPED | OUTPATIENT
Start: 2018-03-26 | End: 2018-06-26 | Stop reason: SDUPTHER

## 2018-04-09 DIAGNOSIS — I25.10 CORONARY ARTERY DISEASE INVOLVING NATIVE CORONARY ARTERY OF NATIVE HEART WITHOUT ANGINA PECTORIS: ICD-10-CM

## 2018-04-09 DIAGNOSIS — E03.9 ACQUIRED HYPOTHYROIDISM: ICD-10-CM

## 2018-04-09 DIAGNOSIS — E87.6 HYPOKALEMIA: ICD-10-CM

## 2018-04-09 DIAGNOSIS — I10 ESSENTIAL HYPERTENSION: Primary | ICD-10-CM

## 2018-04-11 RX ORDER — HYDROCHLOROTHIAZIDE 25 MG/1
TABLET ORAL
Qty: 30 TAB | Refills: 3 | Status: SHIPPED | OUTPATIENT
Start: 2018-04-11 | End: 2018-04-11 | Stop reason: SDUPTHER

## 2018-04-11 RX ORDER — HYDROCHLOROTHIAZIDE 25 MG/1
TABLET ORAL
Qty: 30 TAB | Refills: 3 | Status: SHIPPED | OUTPATIENT
Start: 2018-04-11 | End: 2018-09-25 | Stop reason: SDUPTHER

## 2018-04-12 ENCOUNTER — OFFICE VISIT (OUTPATIENT)
Dept: FAMILY MEDICINE CLINIC | Age: 48
End: 2018-04-12

## 2018-04-12 VITALS
HEART RATE: 62 BPM | OXYGEN SATURATION: 99 % | RESPIRATION RATE: 22 BRPM | DIASTOLIC BLOOD PRESSURE: 74 MMHG | WEIGHT: 187 LBS | HEIGHT: 64 IN | BODY MASS INDEX: 31.92 KG/M2 | SYSTOLIC BLOOD PRESSURE: 114 MMHG | TEMPERATURE: 97.4 F

## 2018-04-12 DIAGNOSIS — R42 VERTIGO: ICD-10-CM

## 2018-04-12 DIAGNOSIS — I25.10 CORONARY ARTERY DISEASE INVOLVING NATIVE HEART, ANGINA PRESENCE UNSPECIFIED, UNSPECIFIED VESSEL OR LESION TYPE: Primary | ICD-10-CM

## 2018-04-12 DIAGNOSIS — Z12.39 SCREENING FOR BREAST CANCER: ICD-10-CM

## 2018-04-12 DIAGNOSIS — I10 ESSENTIAL HYPERTENSION: ICD-10-CM

## 2018-04-12 NOTE — PROGRESS NOTES
1. Have you been to the ER, urgent care clinic since your last visit? Hospitalized since your last visit? No    2. Have you seen or consulted any other health care providers outside of the [address] [place] [address] since your last visit? Include any pap smears or colon screening.  No

## 2018-04-12 NOTE — MR AVS SNAPSHOT
Sonny Rodriguez 
 
 
 [address] 
[phone] Patient: Elmira Carver MRN: [id number] HWK:6/72/1953 Visit Information Date & Time Provider Department Dept. Phone Encounter #  
 4/12/2018  8:30 AM Mario Alberto Garcia NP [address] [phone] [id number] Follow-up Instructions Return in about 1 year (around 4/12/2019). Your Appointments 5/16/2018  3:00 PM  
ESTABLISHED PATIENT with Mario Alberto Garcia NP  
[address] [place] ([address]) Appt Note: med f/u  
 [address] [address]  
[address] [facility] [address] [address] Upcoming Health Maintenance Date Due DTaP/Tdap/Td series (2 - Td) 5/20/2026 Allergies as of 4/12/2018  Review Complete On: 4/12/2018 By: Chastity Lee RN Severity Noted Reaction Type Reactions Dilaudid [Hydromorphone (Bulk)] High 07/18/2014   Systemic Unknown (comments) Respiratory suppression leading to intubation Current Immunizations  Reviewed on 5/20/2016 Name Date Influenza Vaccine  Deferred (Patient Refused) Tdap 5/20/2016 Not reviewed this visit You Were Diagnosed With   
  
 Codes Comments Coronary artery disease involving native heart, angina presence unspecified, unspecified vessel or lesion type    -  Primary ICD-10-CM: I25.10 ICD-9-CM: 414.01   
 BMI 32.0-32.9,adult     ICD-10-CM: G59.73 
ICD-9-CM: V85.32 Screening for breast cancer     ICD-10-CM: Z12.31 
ICD-9-CM: V76.10 Essential hypertension     ICD-10-CM: I10 
ICD-9-CM: 401.9 Vertigo     ICD-10-CM: L61 ICD-9-CM: 780.4 Vitals BP Pulse Temp Resp Height(growth percentile) 114/74 (BP 1 Location: Left arm, BP Patient Position: Sitting) 62 97.4 °F (36.3 °C) (Temporal) 22 5' 4\" (1.626 m) Weight(growth percentile) SpO2 BMI OB Status Smoking Status 187 lb (84.8 kg) 99% 32.1 kg/m2 Hysterectomy Current Every Day Smoker Vitals History BMI and BSA Data Body Mass Index Body Surface Area  
 32.1 kg/m 2 1.96 m 2 Preferred Pharmacy Pharmacy Name Phone [address] [phone] Your Updated Medication List  
  
   
This list is accurate as of 4/12/18  9:54 AM.  Always use your most recent med list. amLODIPine-benazepril 10-40 mg per capsule Commonly known as:  LOTREL  
TAKE ONE CAPSULE BY MOUTH ONCE DAILY  
  
 aspirin 81 mg chewable tablet Take 1 Tab by mouth daily. CENTRUM PO Take 1 Tab by mouth daily. hydroCHLOROthiazide 25 mg tablet Commonly known as:  HYDRODIURIL  
TAKE ONE TABLET BY MOUTH ONCE DAILY  
  
 levothyroxine 100 mcg tablet Commonly known as:  SYNTHROID Take 1 Tab by mouth Daily (before breakfast). metFORMIN  mg tablet Commonly known as:  GLUCOPHAGE XR Takes 1 BID  
  
 naproxen sodium 220 mg Cap Take 220 Caps by mouth daily as needed. potassium chloride SR 10 mEq tablet Commonly known as:  KLOR-CON 10 Take 1 Tab by mouth two (2) times a day. pravastatin 10 mg tablet Commonly known as:  PRAVACHOL  
TAKE 1 TABLET BY MOUTH ONCE DAILY (MUST  MAKE  AN  APPOINTMENT WITH DOCTOR  FOR  FURTHER  REFILLS)  
  
 topiramate 50 mg tablet Commonly known as:  TOPAMAX TAKE ONE TABLET BY MOUTH TWICE DAILY, may taper to half a tablet bid for 1 mo, the half a tablet at night for 1 mo then stop VITAMIN D3 2,000 unit Tab Generic drug:  cholecalciferol (vitamin D3) Take 2 Caps by mouth. We Performed the Following CBC WITH AUTOMATED DIFF [25842 CPT(R)] COLLECTION VENOUS BLOOD,VENIPUNCTURE [id number] CPT(R)] HEMOGLOBIN A1C WITH EAG [43972 CPT(R)] LIPID PANEL [68846 CPT(R)] METABOLIC PANEL, COMPREHENSIVE [38374 CPT(R)] Follow-up Instructions Return in about 1 year (around 4/12/2019). Introducing [facility]! [facility] introduces [employer] patient portal. Now you can access parts of your medical record, email your doctor's office, and request medication refills online. 1. In your internet browser, go to [email or web address] 2. Click on the First Time User? Click Here link in the Sign In box. You will see the New Member Sign Up page. 3. Enter your Famigo Access Code exactly as it appears below. You will not need to use this code after youve completed the sign-up process. If you do not sign up before the expiration date, you must request a new code. · Famigo Access Code: [email or web address] Expires: 7/11/2018  9:43 AM 
 
4. Enter the last four digits of your Social Security Number (xxxx) and Date of Birth (mm/dd/yyyy) as indicated and click Submit. You will be taken to the next sign-up page. 5. Create a Famigo ID. This will be your Famigo login ID and cannot be changed, so think of one that is secure and easy to remember. 6. Create a Famigo password. You can change your password at any time. 7. Enter your Password Reset Question and Answer. This can be used at a later time if you forget your password. 8. Enter your e-mail address. You will receive e-mail notification when new information is available in [address] 9. Click Sign Up. You can now view and download portions of your medical record. 10. Click the Download Summary menu link to download a portable copy of your medical information. If you have questions, please visit the Frequently Asked Questions section of the Famigo website. Remember, Famigo is NOT to be used for urgent needs. For medical emergencies, dial 911. Now available from your iPhone and Android! Please provide this summary of care documentation to your next provider. Your primary care clinician is listed as Brent Wilder. If you have any questions after today's visit, please call [phone].

## 2018-04-13 LAB
ALBUMIN SERPL-MCNC: 4.3 G/DL (ref 3.5–5.5)
ALBUMIN/GLOB SERPL: 1.7 {RATIO} (ref 1.2–2.2)
ALP SERPL-CCNC: 70 IU/L (ref 39–117)
ALT SERPL-CCNC: 11 IU/L (ref 0–32)
AST SERPL-CCNC: 15 IU/L (ref 0–40)
BASOPHILS # BLD AUTO: 0 X10E3/UL (ref 0–0.2)
BASOPHILS NFR BLD AUTO: 1 %
BILIRUB SERPL-MCNC: <0.2 MG/DL (ref 0–1.2)
BUN SERPL-MCNC: 21 MG/DL (ref 6–24)
BUN/CREAT SERPL: 30 (ref 9–23)
CALCIUM SERPL-MCNC: 9.3 MG/DL (ref 8.7–10.2)
CHLORIDE SERPL-SCNC: 107 MMOL/L (ref 96–106)
CHOLEST SERPL-MCNC: 183 MG/DL (ref 100–199)
CO2 SERPL-SCNC: 24 MMOL/L (ref 18–29)
CREAT SERPL-MCNC: 0.7 MG/DL (ref 0.57–1)
EOSINOPHIL # BLD AUTO: 0.2 X10E3/UL (ref 0–0.4)
EOSINOPHIL NFR BLD AUTO: 2 %
ERYTHROCYTE [DISTWIDTH] IN BLOOD BY AUTOMATED COUNT: 14.2 % (ref 12.3–15.4)
EST. AVERAGE GLUCOSE BLD GHB EST-MCNC: 111 MG/DL
GFR SERPLBLD CREATININE-BSD FMLA CKD-EPI: 104 ML/MIN/1.73
GFR SERPLBLD CREATININE-BSD FMLA CKD-EPI: 120 ML/MIN/1.73
GLOBULIN SER CALC-MCNC: 2.5 G/DL (ref 1.5–4.5)
GLUCOSE SERPL-MCNC: 77 MG/DL (ref 65–99)
HBA1C MFR BLD: 5.5 % (ref 4.8–5.6)
HCT VFR BLD AUTO: 37.6 % (ref 34–46.6)
HDLC SERPL-MCNC: 65 MG/DL
HGB BLD-MCNC: 12.1 G/DL (ref 11.1–15.9)
IMM GRANULOCYTES # BLD: 0 X10E3/UL (ref 0–0.1)
IMM GRANULOCYTES NFR BLD: 0 %
LDLC SERPL CALC-MCNC: 104 MG/DL (ref 0–99)
LYMPHOCYTES # BLD AUTO: 2.2 X10E3/UL (ref 0.7–3.1)
LYMPHOCYTES NFR BLD AUTO: 31 %
MCH RBC QN AUTO: 29.7 PG (ref 26.6–33)
MCHC RBC AUTO-ENTMCNC: 32.2 G/DL (ref 31.5–35.7)
MCV RBC AUTO: 92 FL (ref 79–97)
MONOCYTES # BLD AUTO: 0.4 X10E3/UL (ref 0.1–0.9)
MONOCYTES NFR BLD AUTO: 6 %
NEUTROPHILS # BLD AUTO: 4.2 X10E3/UL (ref 1.4–7)
NEUTROPHILS NFR BLD AUTO: 60 %
PLATELET # BLD AUTO: 316 X10E3/UL (ref 150–379)
POTASSIUM SERPL-SCNC: 3.6 MMOL/L (ref 3.5–5.2)
PROT SERPL-MCNC: 6.8 G/DL (ref 6–8.5)
RBC # BLD AUTO: 4.08 X10E6/UL (ref 3.77–5.28)
SODIUM SERPL-SCNC: 147 MMOL/L (ref 134–144)
TRIGL SERPL-MCNC: 70 MG/DL (ref 0–149)
VLDLC SERPL CALC-MCNC: 14 MG/DL (ref 5–40)
WBC # BLD AUTO: 7 X10E3/UL (ref 3.4–10.8)

## 2018-04-13 NOTE — PROGRESS NOTES
Good news !   CBC no anemia  Lipid panel is excellent  HGBA1C 5.5 average blood glucose 111 excellent  Metabolic panel good liver and kidney functions

## 2018-04-14 NOTE — PROGRESS NOTES
Subjective:     Yakov Meeks is a 56 y.o. female who presents today with the following:  Chief Complaint   Patient presents with    Hypertension     checkup and labs    Cholesterol Problem       Patient Active Problem List   Diagnosis Code    Hypertension I10    Vitamin D deficiency E55.9    Hypokalemia E87.6    Migraine headache G43.909    CAD (coronary artery disease) I25.10    Pelvic mass R19.00    Abnormal vaginal bleeding N93.9    Hypothyroidism E03.9    Vertigo R42    Acute respiratory failure (HCC) J96.00    Pre-diabetes R73.03         COMPLIANT WITH MEDICATION:   HTN; Denies chest pain, dyspnea, palpitations, headache and blurred vision. Blood pressure normotensive.     BMO      ROS:  Gen: denies fever, chills, fatigue, weight loss, weight gain  HEENT:denies blurry vision, nasal congestion, sore throat  Resp: denies dypsnea, cough, wheezing  CV: denies chest pain radiating to the jaws or arms, palpitations, lower extremity edema  Abd: denies nausea, vomiting, diarrhea, constipation  Neuro: denies numbness/tingling  Endo: denies polyuria, polydipsia, heat/cold intolerance  Heme: no lymphadenopathy    Allergies   Allergen Reactions    Dilaudid [Hydromorphone (Bulk)] Unknown (comments)     Respiratory suppression leading to intubation         Current Outpatient Prescriptions:     hydroCHLOROthiazide (HYDRODIURIL) 25 mg tablet, TAKE ONE TABLET BY MOUTH ONCE DAILY, Disp: 30 Tab, Rfl: 3    pravastatin (PRAVACHOL) 10 mg tablet, TAKE 1 TABLET BY MOUTH ONCE DAILY (MUST  MAKE  AN  APPOINTMENT WITH DOCTOR  FOR  FURTHER  REFILLS), Disp: 30 Tab, Rfl: 0    amLODIPine-benazepril (LOTREL) 10-40 mg per capsule, TAKE ONE CAPSULE BY MOUTH ONCE DAILY, Disp: 90 Cap, Rfl: 0    topiramate (TOPAMAX) 50 mg tablet, TAKE ONE TABLET BY MOUTH TWICE DAILY, may taper to half a tablet bid for 1 mo, the half a tablet at night for 1 mo then stop, Disp: 180 Tab, Rfl: 0    naproxen sodium 220 mg cap, Take 220 Caps by mouth daily as needed. , Disp: , Rfl:     potassium chloride SR (KLOR-CON 10) 10 mEq tablet, Take 1 Tab by mouth two (2) times a day. (Patient taking differently: Take 10 mEq by mouth two (2) times a day. Takes 2 bid), Disp: 180 Tab, Rfl: 1    metFORMIN ER (GLUCOPHAGE XR) 500 mg tablet, Takes 1 BID, Disp: , Rfl: 0    levothyroxine (SYNTHROID) 100 mcg tablet, Take 1 Tab by mouth Daily (before breakfast). , Disp: 14 Tab, Rfl: 0    aspirin 81 mg chewable tablet, Take 1 Tab by mouth daily. , Disp: 30 Tab, Rfl: 11    cholecalciferol, vitamin D3, (VITAMIN D3) 2,000 unit Tab, Take 2 Caps by mouth., Disp: , Rfl:     FOLIC ACID/MV,FE,OTHER MIN (CENTRUM PO), Take 1 Tab by mouth daily. , Disp: , Rfl:     Past Medical History:   Diagnosis Date    CAD (coronary artery disease) 10/2013    Hypertension     Hyperthyroidism     Hypokalemia     Other ill-defined conditions(799.89)     MIGRAINE SUÁREZ,     Thyroid disease     GRAVES    Unspecified sleep apnea     DOES NOT USE C-PAP HAS AN ORDER FOR IT    Vitamin D deficiency        Past Surgical History:   Procedure Laterality Date    CARDIAC SURG PROCEDURE UNLIST  10-13    CARDIAC CATH    HX HEENT      Radioactive thyroid    HX HYSTERECTOMY      PARTIAL    HX HYSTERECTOMY  1/24/14    BSO       History   Smoking Status    Current Every Day Smoker    Packs/day: 1.00    Years: 10.00    Last attempt to quit: 10/12/2013   Smokeless Tobacco    Never Used       Social History     Social History    Marital status: SINGLE     Spouse name: N/A    Number of children: N/A    Years of education: N/A     Social History Main Topics    Smoking status: Current Every Day Smoker     Packs/day: 1.00     Years: 10.00     Last attempt to quit: 10/12/2013    Smokeless tobacco: Never Used    Alcohol use 0.0 oz/week     0 Standard drinks or equivalent per week    Drug use: No    Sexual activity: Not Asked     Other Topics Concern    None     Social History Narrative       Family History Problem Relation Age of Onset    Kidney Disease Mother     Hypertension Mother     Diabetes Mother     Heart Disease Mother     Cancer Father      Brain    Heart Disease Brother     Hypertension Brother     Diabetes Maternal Grandmother     Cancer Maternal Grandmother     Heart Disease Maternal Grandmother     Hypertension Brother     Hypertension Sister     Diabetes Sister     Hypertension Sister     Hypertension Sister     Glaucoma Brother          Objective:     Visit Vitals    /74 (BP 1 Location: Left arm, BP Patient Position: Sitting)    Pulse 62    Temp 97.4 °F (36.3 °C) (Temporal)    Resp 22    Ht 5' 4\" (1.626 m)    Wt 187 lb (84.8 kg)    SpO2 99%    BMI 32.1 kg/m2     Body mass index is 32.1 kg/(m^2). General: Alert and oriented. No acute distress. Well nourished  HEENT :  Ears:TMs are normal. Canals are clear. Eyes: pupils equal, round, react to light and accommodation. Extra ocular movements intact. Nose: patent. Mouth and throat is clear. Neck:supple full range of motion no thyromegaly. Trachea midline, No carotid bruits. No significant lymphadenopathy  Lungs[de-identified] clear to auscultation without wheezes, rales, or rhonchi. Heart :RRR, S1 & S2 are normal intensity. No murmur; no gallop  Abdomen: bowel sounds active. No tenderness, guarding, rebound, masses, hepatic or spleen enlargement  Back: no CVA tenderness. Extremities: without clubbing, cyanosis, or edema  Pulses: radial and femoral pulses are normal  Neuro: HMF intact.  Cranial nerves II through XII grossly normal.  Motor: is 5 over 5 and symmetrical.   Deep tendon reflexes: +2 equal    Results for orders placed or performed in visit on 04/12/18   CBC WITH AUTOMATED DIFF   Result Value Ref Range    WBC 7.0 3.4 - 10.8 x10E3/uL    RBC 4.08 3.77 - 5.28 x10E6/uL    HGB 12.1 11.1 - 15.9 g/dL    HCT 37.6 34.0 - 46.6 %    MCV 92 79 - 97 fL    MCH 29.7 26.6 - 33.0 pg    MCHC 32.2 31.5 - 35.7 g/dL    RDW 14.2 12.3 - 15.4 % PLATELET 941 943 - 621 x10E3/uL    NEUTROPHILS 60 Not Estab. %    Lymphocytes 31 Not Estab. %    MONOCYTES 6 Not Estab. %    EOSINOPHILS 2 Not Estab. %    BASOPHILS 1 Not Estab. %    ABS. NEUTROPHILS 4.2 1.4 - 7.0 x10E3/uL    Abs Lymphocytes 2.2 0.7 - 3.1 x10E3/uL    ABS. MONOCYTES 0.4 0.1 - 0.9 x10E3/uL    ABS. EOSINOPHILS 0.2 0.0 - 0.4 x10E3/uL    ABS. BASOPHILS 0.0 0.0 - 0.2 x10E3/uL    IMMATURE GRANULOCYTES 0 Not Estab. %    ABS. IMM. GRANS. 0.0 0.0 - 0.1 x10E3/uL   LIPID PANEL   Result Value Ref Range    Cholesterol, total 183 100 - 199 mg/dL    Triglyceride 70 0 - 149 mg/dL    HDL Cholesterol 65 >39 mg/dL    VLDL, calculated 14 5 - 40 mg/dL    LDL, calculated 104 (H) 0 - 99 mg/dL   METABOLIC PANEL, COMPREHENSIVE   Result Value Ref Range    Glucose 77 65 - 99 mg/dL    BUN 21 6 - 24 mg/dL    Creatinine 0.70 0.57 - 1.00 mg/dL    GFR est non- >59 mL/min/1.73    GFR est  >59 mL/min/1.73    BUN/Creatinine ratio 30 (H) 9 - 23    Sodium 147 (H) 134 - 144 mmol/L    Potassium 3.6 3.5 - 5.2 mmol/L    Chloride 107 (H) 96 - 106 mmol/L    CO2 24 18 - 29 mmol/L    Calcium 9.3 8.7 - 10.2 mg/dL    Protein, total 6.8 6.0 - 8.5 g/dL    Albumin 4.3 3.5 - 5.5 g/dL    GLOBULIN, TOTAL 2.5 1.5 - 4.5 g/dL    A-G Ratio 1.7 1.2 - 2.2    Bilirubin, total <0.2 0.0 - 1.2 mg/dL    Alk.  phosphatase 70 39 - 117 IU/L    AST (SGOT) 15 0 - 40 IU/L    ALT (SGPT) 11 0 - 32 IU/L   HEMOGLOBIN A1C WITH EAG   Result Value Ref Range    Hemoglobin A1c 5.5 4.8 - 5.6 %    Estimated average glucose 111 mg/dL       Results for orders placed or performed in visit on 04/12/18   CBC WITH AUTOMATED DIFF   Result Value Ref Range    WBC 7.0 3.4 - 10.8 x10E3/uL    RBC 4.08 3.77 - 5.28 x10E6/uL    HGB 12.1 11.1 - 15.9 g/dL    HCT 37.6 34.0 - 46.6 %    MCV 92 79 - 97 fL    MCH 29.7 26.6 - 33.0 pg    MCHC 32.2 31.5 - 35.7 g/dL    RDW 14.2 12.3 - 15.4 %    PLATELET 832 409 - 081 x10E3/uL    NEUTROPHILS 60 Not Estab. %    Lymphocytes 31 Not Estab. % MONOCYTES 6 Not Estab. %    EOSINOPHILS 2 Not Estab. %    BASOPHILS 1 Not Estab. %    ABS. NEUTROPHILS 4.2 1.4 - 7.0 x10E3/uL    Abs Lymphocytes 2.2 0.7 - 3.1 x10E3/uL    ABS. MONOCYTES 0.4 0.1 - 0.9 x10E3/uL    ABS. EOSINOPHILS 0.2 0.0 - 0.4 x10E3/uL    ABS. BASOPHILS 0.0 0.0 - 0.2 x10E3/uL    IMMATURE GRANULOCYTES 0 Not Estab. %    ABS. IMM. GRANS. 0.0 0.0 - 0.1 x10E3/uL    Narrative    Performed at:  [address]  [id number]  : Xavier Davis MD, Phone:  [phone]   LIPID PANEL   Result Value Ref Range    Cholesterol, total 183 100 - 199 mg/dL    Triglyceride 70 0 - 149 mg/dL    HDL Cholesterol 65 >39 mg/dL    VLDL, calculated 14 5 - 40 mg/dL    LDL, calculated 104 (H) 0 - 99 mg/dL    Narrative    Performed at:  [address]  [id number]  : Xavier Davis MD, Phone:  [phone]   METABOLIC PANEL, COMPREHENSIVE   Result Value Ref Range    Glucose 77 65 - 99 mg/dL    BUN 21 6 - 24 mg/dL    Creatinine 0.70 0.57 - 1.00 mg/dL    GFR est non- >59 mL/min/1.73    GFR est  >59 mL/min/1.73    BUN/Creatinine ratio 30 (H) 9 - 23    Sodium 147 (H) 134 - 144 mmol/L    Potassium 3.6 3.5 - 5.2 mmol/L    Chloride 107 (H) 96 - 106 mmol/L    CO2 24 18 - 29 mmol/L    Calcium 9.3 8.7 - 10.2 mg/dL    Protein, total 6.8 6.0 - 8.5 g/dL    Albumin 4.3 3.5 - 5.5 g/dL    GLOBULIN, TOTAL 2.5 1.5 - 4.5 g/dL    A-G Ratio 1.7 1.2 - 2.2    Bilirubin, total <0.2 0.0 - 1.2 mg/dL    Alk.  phosphatase 70 39 - 117 IU/L    AST (SGOT) 15 0 - 40 IU/L    ALT (SGPT) 11 0 - 32 IU/L    Narrative    Performed at:  [address]  [id number]  : Xavier Davis MD, Phone:  [phone]   HEMOGLOBIN A1C WITH EAG   Result Value Ref Range    Hemoglobin A1c 5.5 4.8 - 5.6 %    Estimated average glucose 111 mg/dL    Narrative    Performed at:  [place] [address], [place], [place]  [id number]  : Xavier Davis MD, Phone:  [phone]       Assessment/ Plan:     1. BMI 32.0-32.9,adult    - CBC WITH AUTOMATED DIFF  - LIPID PANEL  - METABOLIC PANEL, COMPREHENSIVE  - HEMOGLOBIN A1C WITH EAG  - COLLECTION VENOUS BLOOD,VENIPUNCTURE    2. Screening for breast cancer  mammogram    3. Coronary artery disease involving native heart, angina presence unspecified, unspecified vessel or lesion type    - CBC WITH AUTOMATED DIFF  - LIPID PANEL  - METABOLIC PANEL, COMPREHENSIVE  - HEMOGLOBIN A1C WITH EAG  - COLLECTION VENOUS BLOOD,VENIPUNCTURE    4. Essential hypertension  BP meeting goal  - CBC WITH AUTOMATED DIFF  - LIPID PANEL  - METABOLIC PANEL, COMPREHENSIVE  - HEMOGLOBIN A1C WITH EAG  - COLLECTION VENOUS BLOOD,VENIPUNCTURE    5. Vertigo  resolved      Orders Placed This Encounter    COLLECTION VENOUS BLOOD,VENIPUNCTURE    CBC WITH AUTOMATED DIFF    LIPID PANEL    METABOLIC PANEL, COMPREHENSIVE    HEMOGLOBIN A1C WITH EAG         Verbal and written instructions (see AVS) provided.  Patient expresses understanding of diagnosis and treatment plan. There are no preventive care reminders to display for this patient. Follow-up Disposition:  Return in about 1 year (around 4/12/2019).       REMY DalalC

## 2018-04-14 NOTE — ACP (ADVANCE CARE PLANNING)
Discussed importance of advanced medical directives with patient. Patient is capable of making decisions.   Gerry SUAREZC

## 2018-04-27 RX ORDER — PRAVASTATIN SODIUM 10 MG/1
TABLET ORAL
Qty: 30 TAB | Refills: 0 | Status: SHIPPED | OUTPATIENT
Start: 2018-04-27 | End: 2018-05-26 | Stop reason: SDUPTHER

## 2018-05-03 ENCOUNTER — TELEPHONE (OUTPATIENT)
Dept: FAMILY MEDICINE CLINIC | Age: 48
End: 2018-05-03

## 2018-05-03 DIAGNOSIS — N63.0 BREAST MASS: Primary | ICD-10-CM

## 2018-05-07 ENCOUNTER — TELEPHONE (OUTPATIENT)
Dept: FAMILY MEDICINE CLINIC | Age: 48
End: 2018-05-07

## 2018-05-07 NOTE — TELEPHONE ENCOUNTER
Patient needs a ANJEL today or tomorrow after being seen in the [facility] ER yesterday.

## 2018-05-07 NOTE — TELEPHONE ENCOUNTER
Spoke to Scarlett Skinner, she went to [facility], numbness and sharp pain, was told to follow up for paresthesia with a specialist they recommended and to see PCP.

## 2018-05-07 NOTE — TELEPHONE ENCOUNTER
Patient already has an appointment with Missy Carlos next week and was advised that she could have her hospital F/U at that same time. She understood.

## 2018-05-15 ENCOUNTER — TELEPHONE (OUTPATIENT)
Dept: FAMILY MEDICINE CLINIC | Age: 48
End: 2018-05-15

## 2018-05-15 DIAGNOSIS — R92.8 ABNORMAL MAMMOGRAM: Primary | ICD-10-CM

## 2018-05-15 NOTE — TELEPHONE ENCOUNTER
Pt called stating someone left her a message to call this office back, it was not anyone up front. Possibly a nurse?  Call [facility]

## 2018-05-16 ENCOUNTER — TELEPHONE (OUTPATIENT)
Dept: FAMILY MEDICINE CLINIC | Age: 48
End: 2018-05-16

## 2018-05-16 ENCOUNTER — OFFICE VISIT (OUTPATIENT)
Dept: FAMILY MEDICINE CLINIC | Age: 48
End: 2018-05-16

## 2018-05-16 VITALS
OXYGEN SATURATION: 97 % | SYSTOLIC BLOOD PRESSURE: 130 MMHG | WEIGHT: 193 LBS | TEMPERATURE: 98.1 F | DIASTOLIC BLOOD PRESSURE: 80 MMHG | HEIGHT: 64 IN | HEART RATE: 88 BPM | BODY MASS INDEX: 32.95 KG/M2

## 2018-05-16 DIAGNOSIS — Z12.39 SCREENING FOR BREAST CANCER: ICD-10-CM

## 2018-05-16 DIAGNOSIS — G54.2 CERVICAL NERVE ROOT IMPINGEMENT: Primary | ICD-10-CM

## 2018-05-16 DIAGNOSIS — N63.0 BREAST LUMP OR MASS: ICD-10-CM

## 2018-05-16 NOTE — MR AVS SNAPSHOT
[address] [place] [address]  
[phone] Patient: Ludwin Messer MRN: [id number] QXT:[birth year] Visit Information Date & Time Provider Department Dept. Phone Encounter #  
 5/16/2018  3:00 PM Meghana Ryan NP [facility] [address] [facility] [phone] [id number] Upcoming Health Maintenance Date Due Influenza Age 5 to Adult 8/1/2018 DTaP/Tdap/Td series (2 - Td) 5/20/2026 Allergies as of 5/16/2018  Review Complete On: 5/16/2018 By: Meghana Ryan NP Severity Noted Reaction Type Reactions Dilaudid [Hydromorphone (Bulk)] High 07/18/2014   Systemic Unknown (comments) Respiratory suppression leading to intubation Current Immunizations  Reviewed on 5/20/2016 Name Date Influenza Vaccine  Deferred (Patient Refused) Tdap 5/20/2016 Not reviewed this visit You Were Diagnosed With   
  
 Codes Comments Cervical nerve root impingement    -  Primary ICD-10-CM: G54.2 ICD-9-CM: 723.4 BMI 33.0-33.9,adult     ICD-10-CM: W40.92 
ICD-9-CM: V85.33 Screening for breast cancer     ICD-10-CM: Z12.31 
ICD-9-CM: V76.10 Breast lump or mass     ICD-10-CM: N63.0 ICD-9-CM: 611.72 Vitals BP Pulse Temp Height(growth percentile) Weight(growth percentile) SpO2  
 130/80 (BP 1 Location: Right arm, BP Patient Position: Sitting) 88 98.1 °F (36.7 °C) (Oral) 5' 4\" (1.626 m) 193 lb (87.5 kg) 97% BMI OB Status Smoking Status 33.13 kg/m2 Hysterectomy Current Every Day Smoker Vitals History BMI and BSA Data Body Mass Index Body Surface Area  
 33.13 kg/m 2 1.99 m 2 Preferred Pharmacy Pharmacy Name Phone [address] [phone] Your Updated Medication List  
  
   
This list is accurate as of 5/16/18  3:59 PM.  Always use your most recent med list.  
  
  
  
  
 amLODIPine-benazepril 10-40 mg per capsule Commonly known as:  LOTREL  
TAKE ONE CAPSULE BY MOUTH ONCE DAILY  
  
 aspirin 81 mg chewable tablet Take 1 Tab by mouth daily. CENTRUM PO Take 1 Tab by mouth daily. hydroCHLOROthiazide 25 mg tablet Commonly known as:  HYDRODIURIL  
TAKE ONE TABLET BY MOUTH ONCE DAILY  
  
 levothyroxine 100 mcg tablet Commonly known as:  SYNTHROID Take 1 Tab by mouth Daily (before breakfast). metFORMIN  mg tablet Commonly known as:  GLUCOPHAGE XR Takes 1 BID  
  
 naproxen sodium 220 mg Cap Take 220 Caps by mouth daily as needed. potassium chloride SR 10 mEq tablet Commonly known as:  KLOR-CON 10 Take 1 Tab by mouth two (2) times a day. pravastatin 10 mg tablet Commonly known as:  PRAVACHOL  
TAKE 1 TABLET BY MOUTH ONCE DAILY MUST  MAKE  AN  APPOINTMENT  WITH  DOCTOR  FOR  FURTHER  REFILLS  
  
 topiramate 50 mg tablet Commonly known as:  TOPAMAX TAKE ONE TABLET BY MOUTH TWICE DAILY, may taper to half a tablet bid for 1 mo, the half a tablet at night for 1 mo then stop VITAMIN D3 2,000 unit Tab Generic drug:  cholecalciferol (vitamin D3) Take 2 Caps by mouth. We Performed the Following REFERRAL TO NEUROLOGY [BDO37 Custom] Comments:  
 First available in the [facility]. Willing to travel if there is a long wait . Has seen Dr. Kwame Lyons in the past.  
  
To-Do List   
 05/16/2018 Imaging:  LEIGHANN MAMMO RT DX INCL CAD Referral Information Referral ID Referred By Referred To  
  
 [id number] Reji CABALLERO MD   
   [place] [address] [address] [facility], [address] [facility] Phone: [phone] Fax: [phone] Visits Status Start Date End Date 1 New Request 5/16/18 5/16/19 If your referral has a status of pending review or denied, additional information will be sent to support the outcome of this decision. Patient Instructions Pinched Nerve in the Neck: Care Instructions Your Care Instructions A pinched nerve in the neck happens when a vertebra or disc in the upper part of your spine is damaged. This damage can happen because of an injury. Or it can just happen with age. The changes caused by the damage may put pressure on a nearby nerve root, pinching it. This causes symptoms such as sharp pain in your neck, shoulder, arm, hand, or back. You may also have tingling or numbness. Sometimes it makes your arm weaker. The symptoms are usually worse when you turn your head or strain your neck. For many people, the symptoms get better over time and finally go away. Early treatment usually includes medicines for pain and swelling. Sometimes physical therapy and special exercises may help. Follow-up care is a key part of your treatment and safety. Be sure to make and go to all appointments, and call your doctor if you are having problems. It's also a good idea to know your test results and keep a list of the medicines you take. How can you care for yourself at home? · Be safe with medicines. Read and follow all instructions on the label. ¨ If the doctor gave you a prescription medicine for pain, take it as prescribed. ¨ If you are not taking a prescription pain medicine, ask your doctor if you can take an over-the-counter medicine. · Try using a heating pad on a low or medium setting for 15 to 20 minutes every 2 or 3 hours. Try a warm shower in place of one session with the heating pad. You can also buy single-use heat wraps that last up to 8 hours. · You can also try an ice pack for 10 to 15 minutes every 2 to 3 hours. There isn't strong evidence that either heat or ice will help. But you can try them to see if they help you. · Don't spend too long in one position. Take short breaks to move around and change positions. · Wear a seat belt and shoulder harness when you are in a car. · Sleep with a pillow under your head and neck that keeps your neck straight. · If you were given a neck brace (cervical collar) to limit neck motion, wear it as instructed for as many days as your doctor tells you to. Do not wear it longer than you were told to. Wearing a brace for too long can lead to neck stiffness and can weaken the neck muscles. · Follow your doctor's instructions for gentle neck-stretching exercises. · Do not smoke. Smoking can slow healing of your discs. If you need help quitting, talk to your doctor about stop-smoking programs and medicines. These can increase your chances of quitting for good. · Avoid strenuous work or exercise until your doctor says it is okay. When should you call for help? Call 911 anytime you think you may need emergency care. For example, call if: 
? · You are unable to move an arm or a leg at all. ?Call your doctor now or seek immediate medical care if: 
? · You have new or worse symptoms in your arms, legs, chest, belly, or buttocks. Symptoms may include: ¨ Numbness or tingling. ¨ Weakness. ¨ Pain. ? · You lose bladder or bowel control. ? Watch closely for changes in your health, and be sure to contact your doctor if: 
? · You are not getting better as expected. Where can you learn more? Go to http://felisa-chandrika.info/. Enter C632 in the search box to learn more about \"Pinched Nerve in the Neck: Care Instructions. \" Current as of: March 21, 2017 Content Version: 11.4 © 1828-1410 [employer]. Care instructions adapted under license by [employer] (which disclaims liability or warranty for this information). If you have questions about a medical condition or this instruction, always ask your healthcare professional. [place] [address] any warranty or liability for your use of this information. Introducing [facility]! [employer] introduces [employer] patient portal. Now you can access parts of your medical record, email your doctor's office, and request medication refills online. 1. In your internet browser, go to [email or web address] 2. Click on the First Time User? Click Here link in the Sign In box. You will see the New Member Sign Up page. 3. Enter your [employer] Access Code exactly as it appears below. You will not need to use this code after youve completed the sign-up process. If you do not sign up before the expiration date, you must request a new code. · [employer] Access Code: [email or web address] Expires: 7/11/2018  9:43 AM 
 
4. Enter the last four digits of your Social Security Number (xxxx) and Date of Birth (mm/dd/yyyy) as indicated and click Submit. You will be taken to the next sign-up page. 5. Create a [employer]andi ID. This will be your [employer] login ID and cannot be changed, so think of one that is secure and easy to remember. 6. Create a [employer] password. You can change your password at any time. 7. Enter your Password Reset Question and Answer. This can be used at a later time if you forget your password. 8. Enter your e-mail address. You will receive e-mail notification when new information is available in [address] 9. Click Sign Up. You can now view and download portions of your medical record. 10. Click the Download Summary menu link to download a portable copy of your medical information. If you have questions, please visit the Frequently Asked Questions section of the [employer] website. Remember, [employer] is NOT to be used for urgent needs. For medical emergencies, dial 911. Now available from your iPhone and Android! Please provide this summary of care documentation to your next provider. Your primary care clinician is listed as Aga Asif. If you have any questions after today's visit, please call [phone].

## 2018-05-16 NOTE — PROGRESS NOTES
1. Have you been to the ER, urgent care clinic since your last visit? Hospitalized since your last visit? Yes When: May 6th [facility]    2. Have you seen or consulted any other health care providers outside of the [facility] since your last visit? Include any pap smears or colon screening.  Yes When: May 6th [facility]

## 2018-05-16 NOTE — ACP (ADVANCE CARE PLANNING)
Advance Care Plan or Surrogate Decision Maker documented in medical record. Patient given ACP to take home to review/sign to return to be scanned in chart.

## 2018-05-16 NOTE — PATIENT INSTRUCTIONS
Pinched Nerve in the Neck: Care Instructions  Your Care Instructions  A pinched nerve in the neck happens when a vertebra or disc in the upper part of your spine is damaged. This damage can happen because of an injury. Or it can just happen with age. The changes caused by the damage may put pressure on a nearby nerve root, pinching it. This causes symptoms such as sharp pain in your neck, shoulder, arm, hand, or back. You may also have tingling or numbness. Sometimes it makes your arm weaker. The symptoms are usually worse when you turn your head or strain your neck. For many people, the symptoms get better over time and finally go away. Early treatment usually includes medicines for pain and swelling. Sometimes physical therapy and special exercises may help. Follow-up care is a key part of your treatment and safety. Be sure to make and go to all appointments, and call your doctor if you are having problems. It's also a good idea to know your test results and keep a list of the medicines you take. How can you care for yourself at home? · Be safe with medicines. Read and follow all instructions on the label. ¨ If the doctor gave you a prescription medicine for pain, take it as prescribed. ¨ If you are not taking a prescription pain medicine, ask your doctor if you can take an over-the-counter medicine. · Try using a heating pad on a low or medium setting for 15 to 20 minutes every 2 or 3 hours. Try a warm shower in place of one session with the heating pad. You can also buy single-use heat wraps that last up to 8 hours. · You can also try an ice pack for 10 to 15 minutes every 2 to 3 hours. There isn't strong evidence that either heat or ice will help. But you can try them to see if they help you. · Don't spend too long in one position. Take short breaks to move around and change positions. · Wear a seat belt and shoulder harness when you are in a car.   · Sleep with a pillow under your head and neck that keeps your neck straight. · If you were given a neck brace (cervical collar) to limit neck motion, wear it as instructed for as many days as your doctor tells you to. Do not wear it longer than you were told to. Wearing a brace for too long can lead to neck stiffness and can weaken the neck muscles. · Follow your doctor's instructions for gentle neck-stretching exercises. · Do not smoke. Smoking can slow healing of your discs. If you need help quitting, talk to your doctor about stop-smoking programs and medicines. These can increase your chances of quitting for good. · Avoid strenuous work or exercise until your doctor says it is okay. When should you call for help? Call 911 anytime you think you may need emergency care. For example, call if:  ? · You are unable to move an arm or a leg at all. ?Call your doctor now or seek immediate medical care if:  ? · You have new or worse symptoms in your arms, legs, chest, belly, or buttocks. Symptoms may include:  ¨ Numbness or tingling. ¨ Weakness. ¨ Pain. ? · You lose bladder or bowel control. ? Watch closely for changes in your health, and be sure to contact your doctor if:  ? · You are not getting better as expected. Where can you learn more? Go to http://felisa-chandrika.info/. Enter W308 in the search box to learn more about \"Pinched Nerve in the Neck: Care Instructions. \"  Current as of: March 21, 2017  Content Version: 11.4  © 2109-7481 [employer]. Care instructions adapted under license by [employer] (which disclaims liability or warranty for this information). If you have questions about a medical condition or this instruction, always ask your healthcare professional. [address] any warranty or liability for your use of this information.

## 2018-05-18 ENCOUNTER — TELEPHONE (OUTPATIENT)
Dept: FAMILY MEDICINE CLINIC | Age: 48
End: 2018-05-18

## 2018-05-18 NOTE — TELEPHONE ENCOUNTER
I was wondering if Zabrina Blanchard was going to put an order in for a Mammogram referral? She mentioned it Wednesday evening. She put one in for breast surgery but that was incorrect so I closed that one.

## 2018-05-18 NOTE — TELEPHONE ENCOUNTER
Pt is scheduled to see Dr. Jenny Colon in [place] on May 25th at 12:30pm. Pt confirms and will attend the appt time and date.

## 2018-05-23 NOTE — ACP (ADVANCE CARE PLANNING)
Discussed importance of advanced medical directives with patient. Patient is capable of making decisions.     Tiffany Anand NP-C

## 2018-05-23 NOTE — ACP (ADVANCE CARE PLANNING)
Discussed importance of advanced medical directives with patient. Patient is capable of making decisions.   Caterina Vega NP-C

## 2018-05-23 NOTE — PROGRESS NOTES
Subjective:     Yoni Andrews is a 56 y.o. female who presents today with the following:  Chief Complaint   Patient presents with    Follow-up     medication follow up   Seen at [facility] on May 6th for Paresthesia possible cervicale nerve root impingement. Excerpt from Dr. Felisha Jacobson note:  Chief Complaint   Patient presents with    Chest Pain     HPI Comments: Yoni Andrews is a 56 y.o. female who presents to the ED for evaluation of CP that occurred today around 16:00-16:00. Pt reports that she was laying down watching TV when she felt two sharp pains occurring in her substernal area before resolving quickly. Pt reports around a hour later she began experiencing tingling in her L arm that extenders from her L upper arm to her fingertips. Pt's tingling is \"like a sleeve\" on her arm, with it more noticeable on the anterior side. Pt has not experienced these sxs before. Pt denies anyone in her family has a history of MS. Medications include Metformin, Hydrochlorothiazide, and Topamax, with reduction of her Topamax recently. MHx includes HTN. Social history includes current smoker, with recent nicotine patch use, and social EtOH use. 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 Pt denies neck pain, HA, visual disturbance, abdominal pain, and emesis. Pt has no other complaints at this time. Pertinent imaging from encounter reviewed  Cervical spine radiographs 05/06/2018        REPORT:        There is no evidence of fracture or dislocation.  The spinal cord and    contents of the spinal canal as visualized show no defined acute CT    abnormality.  Spondylosis is noted, with evidence of mild degenerative disc    disease at C4-5 where there is a small component of central posterior disc    protrusion.  Mild uncovertebral degenerative changes at C3-4 and C4-5.      RESULT    EXAM DESCRIPTION:    DX SPINE CERVICAL 4 OR 5 VIEWS        COMPLETED DATE/TIME:    5/6/2018 9:19 pm        REASON FOR EXAM:    paresthesias, left arm        COMPARISON:    None        TECHNIQUE:    Five views of the cervical spine obtained.        REPORT:    Mild straightening of the cervical lordosis.  The atlanto dens interval is    normal.  The prevertebral soft tissues are within normal limits.  Mild C4-5    disc height loss.  Mild left-greater-than-right C4-5 uncinate hypertrophy.    No significant neural foraminal stenosis.  The lung apices are clear    At present she has intermittent left arm pain with numbnes and tingling sensation. Patient Active Problem List   Diagnosis Code    Hypertension I10    Vitamin D deficiency E55.9    Hypokalemia E87.6    Migraine headache G43.909    CAD (coronary artery disease) I25.10    Pelvic mass R19.00    Abnormal vaginal bleeding N93.9    Hypothyroidism E03.9    Vertigo R42    Acute respiratory failure (HCC) J96.00    Pre-diabetes R73.03         COMPLIANT WITH MEDICATION:   ; Denies chest pain, dyspnea, palpitations, headache and blurred vision. Blood pressure normotensive.       ROS:  Gen: denies fever, chills, fatigue, weight loss, weight gain  HEENT:denies blurry vision, nasal congestion, sore throat  Resp: denies dypsnea, cough, wheezing  CV: denies chest pain radiating to the jaws or arms, palpitations, lower extremity edema  Abd: denies nausea, vomiting, diarrhea, constipation  Neuro: denies numbness/tingling  Endo: denies polyuria, polydipsia, heat/cold intolerance  Heme: no lymphadenopathy    Allergies   Allergen Reactions    Dilaudid [Hydromorphone (Bulk)] Unknown (comments)     Respiratory suppression leading to intubation         Current Outpatient Prescriptions:     pravastatin (PRAVACHOL) 10 mg tablet, TAKE 1 TABLET BY MOUTH ONCE DAILY MUST  MAKE  AN  APPOINTMENT  WITH  DOCTOR  FOR  FURTHER  REFILLS, Disp: 30 Tab, Rfl: 0    hydroCHLOROthiazide (HYDRODIURIL) 25 mg tablet, TAKE ONE TABLET BY MOUTH ONCE DAILY, Disp: 30 Tab, Rfl: 3    amLODIPine-benazepril (LOTREL) 10-40 mg per capsule, TAKE ONE CAPSULE BY MOUTH ONCE DAILY, Disp: 90 Cap, Rfl: 0    topiramate (TOPAMAX) 50 mg tablet, TAKE ONE TABLET BY MOUTH TWICE DAILY, may taper to half a tablet bid for 1 mo, the half a tablet at night for 1 mo then stop, Disp: 180 Tab, Rfl: 0    naproxen sodium 220 mg cap, Take 220 Caps by mouth daily as needed. , Disp: , Rfl:     potassium chloride SR (KLOR-CON 10) 10 mEq tablet, Take 1 Tab by mouth two (2) times a day. (Patient taking differently: Take 10 mEq by mouth two (2) times a day. Takes 2 bid), Disp: 180 Tab, Rfl: 1    metFORMIN ER (GLUCOPHAGE XR) 500 mg tablet, Takes 1 BID, Disp: , Rfl: 0    levothyroxine (SYNTHROID) 100 mcg tablet, Take 1 Tab by mouth Daily (before breakfast). , Disp: 14 Tab, Rfl: 0    aspirin 81 mg chewable tablet, Take 1 Tab by mouth daily. , Disp: 30 Tab, Rfl: 11    cholecalciferol, vitamin D3, (VITAMIN D3) 2,000 unit Tab, Take 2 Caps by mouth., Disp: , Rfl:     FOLIC ACID/MV,FE,OTHER MIN (CENTRUM PO), Take 1 Tab by mouth daily. , Disp: , Rfl:     Past Medical History:   Diagnosis Date    CAD (coronary artery disease) 10/2013    Hypertension     Hyperthyroidism     Hypokalemia     Menopause     Other ill-defined conditions(799.89)     MIGRAINE SUÁREZ,     Thyroid disease     GRAVES    Unspecified sleep apnea     DOES NOT USE C-PAP HAS AN ORDER FOR IT    Vitamin D deficiency        Past Surgical History:   Procedure Laterality Date    CARDIAC SURG PROCEDURE UNLIST  10-13    CARDIAC CATH    HX HEENT      Radioactive thyroid    HX HYSTERECTOMY      PARTIAL    HX HYSTERECTOMY  1/24/14    BSO    HX OOPHORECTOMY         History   Smoking Status    Current Every Day Smoker    Packs/day: 1.00    Years: 10.00    Last attempt to quit: 10/12/2013   Smokeless Tobacco    Never Used       Social History     Social History    Marital status: SINGLE     Spouse name: N/A    Number of children: N/A    Years of education: N/A     Social History Main Topics    Smoking status: Current Every Day Smoker     Packs/day: 1.00     Years: 10.00     Last attempt to quit: 10/12/2013    Smokeless tobacco: Never Used    Alcohol use 0.0 oz/week     0 Standard drinks or equivalent per week    Drug use: No    Sexual activity: Not Asked     Other Topics Concern    None     Social History Narrative       Family History   Problem Relation Age of Onset    Kidney Disease Mother     Hypertension Mother     Diabetes Mother     Heart Disease Mother     Cancer Father      Brain    Heart Disease Brother     Hypertension Brother     Diabetes Maternal Grandmother     Cancer Maternal Grandmother     Heart Disease Maternal Grandmother     Hypertension Brother     Hypertension Sister     Diabetes Sister     Hypertension Sister     Hypertension Sister     Glaucoma Brother          Objective:     Visit Vitals    /80 (BP 1 Location: Right arm, BP Patient Position: Sitting)    Pulse 88    Temp 98.1 °F (36.7 °C) (Oral)    Ht 5' 4\" (1.626 m)    Wt 193 lb (87.5 kg)    SpO2 97%    BMI 33.13 kg/m2     Body mass index is 33.13 kg/(m^2). General: Alert and oriented. No acute distress. Well nourished  HEENT :  Ears:TMs are normal. Canals are clear. Eyes: pupils equal, round, react to light and accommodation. Extra ocular movements intact. Nose: patent. Mouth and throat is clear. Neck:supple full range of motion no thyromegaly. Trachea midline, No carotid bruits. No significant lymphadenopathy  Lungs[de-identified] clear to auscultation without wheezes, rales, or rhonchi. Heart :RRR, S1 & S2 are normal intensity. No murmur; no gallop  Abdomen: bowel sounds active. No tenderness, guarding, rebound, masses, hepatic or spleen enlargement  Back: no CVA tenderness. Extremities: without clubbing, cyanosis, or edema  Pulses: radial and femoral pulses are normal  Neuro: HMF intact.  Cranial nerves II through XII grossly normal.  Motor: is 5 over 5 and symmetrical.   Deep tendon reflexes: +2 equal        No results found for this visit on 05/16/18. Assessment/ Plan:     1. BMI 33.0-33.9,adult  Discussed the patient's BMI with her. The BMI follow up plan is as follows:     dietary management education, guidance, and counseling  encourage exercise  monitor weight  prescribed dietary intake    An After Visit Summary was printed and given to the patient. 2. Screening for breast cancer  2nd opinion requested  - [facility] MAMMO RT DX INCL CAD; Future    3. Breast lump or mass  Second opinion  - [facility] MAMMO RT DX INCL CAD; Future    4. Cervical nerve root impingement  Differential diagnosis  - [facility] ref [facility]      Orders Placed This Encounter    [facility] MAMMO RT DX INCL CAD     Standing Status:   Future     Standing Expiration Date:   6/16/2019     Scheduling Instructions:      [facility]     Order Specific Question:   Reason for Exam     Answer:   second opinion     Order Specific Question:   Is Patient Pregnant? Answer:   No    [facility]     Referral Priority:   Routine     Referral Type:   Consultation     Referral Reason:   Specialty Services Required     Referred to Provider:   Jayant Martin MD     Requested Specialty:   Neurology         Verbal and written instructions (see AVS) provided.  Patient expresses understanding of diagnosis and treatment plan. There are no preventive care reminders to display for this patient. Follow-up Disposition:  Return in about 3 months (around 8/16/2018).       SPENSER Malik

## 2018-05-25 ENCOUNTER — OFFICE VISIT (OUTPATIENT)
Dept: NEUROLOGY | Age: 48
End: 2018-05-25

## 2018-05-25 VITALS
HEIGHT: 64 IN | TEMPERATURE: 98.5 F | HEART RATE: 68 BPM | OXYGEN SATURATION: 98 % | DIASTOLIC BLOOD PRESSURE: 78 MMHG | SYSTOLIC BLOOD PRESSURE: 118 MMHG | RESPIRATION RATE: 16 BRPM | BODY MASS INDEX: 32.64 KG/M2 | WEIGHT: 191.2 LBS

## 2018-05-25 DIAGNOSIS — R29.898 LEFT ARM WEAKNESS: ICD-10-CM

## 2018-05-25 DIAGNOSIS — G43.909 MIGRAINE WITHOUT STATUS MIGRAINOSUS, NOT INTRACTABLE, UNSPECIFIED MIGRAINE TYPE: ICD-10-CM

## 2018-05-25 DIAGNOSIS — R20.0 LEFT ARM NUMBNESS: Primary | ICD-10-CM

## 2018-05-25 RX ORDER — METHYLPREDNISOLONE 4 MG/1
TABLET ORAL
Qty: 1 DOSE PACK | Refills: 0 | Status: SHIPPED | OUTPATIENT
Start: 2018-05-25 | End: 2018-09-25

## 2018-05-25 NOTE — PATIENT INSTRUCTIONS

## 2018-05-25 NOTE — MR AVS SNAPSHOT
[address] [place] [address] [phone] Patient: Evangelist Prieto MRN: [id number] [facility]:[birth year] Visit Information Date & Time Provider Department Dept. Phone Encounter #  
 5/25/2018  1:00 PM Heather Roy MD [facility] Neurology [facility] [phone] [id number] Follow-up Instructions Return for at [facility] after test results. Follow-up and Disposition History Upcoming Health Maintenance Date Due Influenza Age 5 to Adult 8/1/2018 DTaP/Tdap/Td series (2 - Td) 5/20/2026 Allergies as of 5/25/2018  Review Complete On: 5/25/2018 By: Heather Roy MD  
  
 Severity Noted Reaction Type Reactions Dilaudid [Hydromorphone (Bulk)] High 07/18/2014   Systemic Unknown (comments) Respiratory suppression leading to intubation Current Immunizations  Reviewed on 5/20/2016 Name Date Influenza Vaccine  Deferred (Patient Refused) Tdap 5/20/2016 Not reviewed this visit You Were Diagnosed With   
  
 Codes Comments Left arm numbness    -  Primary ICD-10-CM: R20.0 ICD-9-CM: 782.0 Left arm weakness     ICD-10-CM: R29.898 ICD-9-CM: 729.89 Migraine without status migrainosus, not intractable, unspecified migraine type     ICD-10-CM: G43.909 ICD-9-CM: 346.90 Vitals Height(growth percentile) Weight(growth percentile) BMI OB Status Smoking Status 5' 4\" (1.626 m) 191 lb 3.2 oz (86.7 kg) 32.82 kg/m2 Hysterectomy Current Every Day Smoker Vitals History BMI and BSA Data Body Mass Index Body Surface Area  
 32.82 kg/m 2 1.98 m 2 Preferred Pharmacy Pharmacy Name Phone [address] [phone] Your Updated Medication List  
  
   
This list is accurate as of 5/25/18  1:58 PM.  Always use your most recent med list.  
  
  
  
  
 amLODIPine-benazepril 10-40 mg per capsule Commonly known as:  LOTREL  
TAKE ONE CAPSULE BY MOUTH ONCE DAILY  
  
 aspirin 81 mg chewable tablet Take 1 Tab by mouth daily. CENTRUM PO Take 1 Tab by mouth daily. hydroCHLOROthiazide 25 mg tablet Commonly known as:  HYDRODIURIL  
TAKE ONE TABLET BY MOUTH ONCE DAILY  
  
 levothyroxine 100 mcg tablet Commonly known as:  SYNTHROID Take 1 Tab by mouth Daily (before breakfast). metFORMIN  mg tablet Commonly known as:  GLUCOPHAGE XR Takes 1 BID  
  
 methylPREDNISolone 4 mg tablet Commonly known as:  [place] Sample Per dose pack instructions  
  
 naproxen sodium 220 mg Cap Take 220 Caps by mouth daily as needed. potassium chloride SR 10 mEq tablet Commonly known as:  KLOR-CON 10 Take 1 Tab by mouth two (2) times a day. pravastatin 10 mg tablet Commonly known as:  PRAVACHOL  
TAKE 1 TABLET BY MOUTH ONCE DAILY MUST  MAKE  AN  APPOINTMENT  WITH  DOCTOR  FOR  FURTHER  REFILLS  
  
 topiramate 50 mg tablet Commonly known as:  TOPAMAX TAKE ONE TABLET BY MOUTH TWICE DAILY, may taper to half a tablet bid for 1 mo, the half a tablet at night for 1 mo then stop VITAMIN D3 2,000 unit Tab Generic drug:  cholecalciferol (vitamin D3) Take 2 Caps by mouth. Prescriptions Sent to Pharmacy Refills  
 methylPREDNISolone (MEDROL DOSEPACK) 4 mg tablet 0 Sig: Per dose pack instructions Class: Normal  
 Pharmacy: [facility] [address] Ph #: [phone] Follow-up Instructions Return for at [facility] after test results. Patient Instructions Neck: Exercises Your Care Instructions Here are some examples of typical rehabilitation exercises for your condition. Start each exercise slowly. Ease off the exercise if you start to have pain.  
Your doctor or physical therapist will tell you when you can start these exercises and which ones will work best for you. How to do the exercises Neck stretch 1. This stretch works best if you keep your shoulder down as you lean away from it. To help you remember to do this, start by relaxing your shoulders and lightly holding on to your thighs or your chair. 2. Tilt your head toward your shoulder and hold for 15 to 30 seconds. Let the weight of your head stretch your muscles. 3. If you would like a little added stretch, use your hand to gently and steadily pull your head toward your shoulder. For example, keeping your right shoulder down, lean your head to the left. 4. Repeat 2 to 4 times toward each shoulder. Diagonal neck stretch 1. Turn your head slightly toward the direction you will be stretching, and tilt your head diagonally toward your chest and hold for 15 to 30 seconds. 2. If you would like a little added stretch, use your hand to gently and steadily pull your head forward on the diagonal. 
3. Repeat 2 to 4 times toward each side. Dorsal glide stretch The dorsal glide stretches the back of the neck. If you feel pain, do not glide so far back. Some people find this exercise easier to do while lying on their backs with an ice pack on the neck. 1. Sit or stand tall and look straight ahead. 2. Slowly tuck your chin as you glide your head backward over your body 3. Hold for a count of 6, and then relax for up to 10 seconds. 4. Repeat 8 to 12 times. Chest and shoulder stretch 1. Sit or stand tall and glide your head backward as in the dorsal glide stretch. 2. Raise both arms so that your hands are next to your ears. 3. Take a deep breath, and as you breathe out, lower your elbows down and behind your back. You will feel your shoulder blades slide down and together, and at the same time you will feel a stretch across your chest and the front of your shoulders. 4. Hold for about 6 seconds, and then relax for up to 10 seconds. 5. Repeat 8 to 12 times. Strengthening: Hands on head 1. Move your head backward, forward, and side to side against gentle pressure from your hands, holding each position for about 6 seconds. 2. Repeat 8 to 12 times. Follow-up care is a key part of your treatment and safety. Be sure to make and go to all appointments, and call your doctor if you are having problems. It's also a good idea to know your test results and keep a list of the medicines you take. Where can you learn more? Go to http://felisa-chandrika.info/. Enter P975 in the search box to learn more about \"Neck: Exercises. \" Current as of: March 21, 2017 Content Version: 11.4 © 3902-7659 [employer]. Care instructions adapted under license by [employer] (which disclaims liability or warranty for this information). If you have questions about a medical condition or this instruction, always ask your healthcare professional. [address] any warranty or liability for your use of this information. Introducing [facility]! Kerry Pillai introduces [employer] patient portal. Now you can access parts of your medical record, email your doctor's office, and request medication refills online. 1. In your internet browser, go to [email or web address] 2. Click on the First Time User? Click Here link in the Sign In box. You will see the New Member Sign Up page. 3. Enter your [employer] Access Code exactly as it appears below. You will not need to use this code after youve completed the sign-up process. If you do not sign up before the expiration date, you must request a new code. · [employer] Access Code: [email or web address] Expires: 7/11/2018  9:43 AM 
 
4. Enter the last four digits of your Social Security Number (xxxx) and Date of Birth (mm/dd/yyyy) as indicated and click Submit. You will be taken to the next sign-up page. 5. Create a [employer] ID. This will be your [employer] login ID and cannot be changed, so think of one that is secure and easy to remember. 6. Create a [employer] password. You can change your password at any time. 7. Enter your Password Reset Question and Answer. This can be used at a later time if you forget your password. 8. Enter your e-mail address. You will receive e-mail notification when new information is available in [address] 9. Click Sign Up. You can now view and download portions of your medical record. 10. Click the Download Summary menu link to download a portable copy of your medical information. If you have questions, please visit the Frequently Asked Questions section of the [employer] website. Remember, [employer] is NOT to be used for urgent needs. For medical emergencies, dial 911. Now available from your iPhone and Android! Please provide this summary of care documentation to your next provider. Your primary care clinician is listed as Mansoor Caceres. If you have any questions after today's visit, please call [phone].

## 2018-05-25 NOTE — PROGRESS NOTES
Visit Vitals    /78 (BP 1 Location: Left arm, BP Patient Position: Sitting)    Pulse 68    Temp 98.5 °F (36.9 °C) (Oral)    Resp 16    Ht 5' 4\" (1.626 m)    Wt 86.7 kg (191 lb 3.2 oz)    SpO2 98%    BMI 32.82 kg/m2

## 2018-05-25 NOTE — PROGRESS NOTES
Evangelist Prieto is a 56 y.o. female    Chief Complaint   Patient presents with    New Patient     np cervical nerve root impigiment seb        1. Have you been to the ER, urgent care clinic since your last visit? Hospitalized since your last visit? no    2. Have you seen or consulted any other health care providers outside of the [address] [place] [address] since your last visit? Include any pap smears or colon screening.   no    Visit Vitals    Ht 5' 4\" (1.626 m)

## 2018-05-25 NOTE — PROGRESS NOTES
Gael Nino is a 56 y.o. female    Chief Complaint   Patient presents with    New Patient     np cervical nerve root impigiment seb        1. Have you been to the ER, urgent care clinic since your last visit? Hospitalized since your last visit? Yes, May 6th 2018 pt went to [facility] for tingling in left arm     2. Have you seen or consulted any other health care providers outside of the [address] [place] [address] since your last visit? Include any pap smears or colon screening.      Visit Vitals    Ht 5' 4\" (1.626 m)    Wt 86.7 kg (191 lb 3.2 oz)    BMI 32.82 kg/m2

## 2018-05-25 NOTE — PROGRESS NOTES
NEUROLOGY NEW PATIENT CONSULTATION    REFERRED BY:  Mansoor Caceres NP    CHIEF COMPLAINT:  Left arm numbness and weakness    HISTORY OF PRESENT ILLNESS    HISTORY PROVIDED BY:  Patient  Family Member: Maxine Hammond is a 56 y.o. female who I am asked to see in consultation for left arm numbness and weakness. Patient reports that on May 6 she was at home when she developed severe chest pain and then radiating pain down her left arm to her hand. She then had a tingling sensation and felt like her hand went completely numb. Subsequently she had weakness in the hand. Patient reports that she was seen at [address]. She had a CT of the head neck and chest that were negative. She was told she had degenerative disc disease. She also and x-rays were normal.  And an EKG that was normal.  She has not prescribed any medications at discharge from the ER. She followed up with her PCP recommended to follow with neurology. Patient is continued to have numbness in the hand and cannot . The pain is not as severe. She will occasionally have cramping. She could see some muscle twitching. She denies any symptoms on the right side or on her legs. She has no prior history of neurology problems. No family history of neurologic problems. She does have stroke risk factors of hypertension, hyperlipidemia, and this metabolic syndrome. She does take aspirin 81 mg daily. 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       PM  Past Medical History:   Diagnosis Date    CAD (coronary artery disease) 10/2013    Hypertension     Hyperthyroidism     Hypokalemia     Menopause     Other ill-defined conditions(799.89)     MIGRAINE SUÁREZ,     Thyroid disease     GRAVES    Unspecified sleep apnea     DOES NOT USE C-PAP HAS AN ORDER FOR IT    Vitamin D deficiency        SH  Social History     Social History    Marital status: SINGLE     Spouse name: N/A    Number of children: N/A    Years of education: N/A     Social History Main Topics    Smoking status: Current Every Day Smoker     Packs/day: 1.00     Years: 10.00     Last attempt to quit: 10/12/2013    Smokeless tobacco: Never Used    Alcohol use 0.0 oz/week     0 Standard drinks or equivalent per week    Drug use: No    Sexual activity: Not on file     Other Topics Concern    Not on file     Social History Narrative       FH  Family History   Problem Relation Age of Onset    Kidney Disease Mother     Hypertension Mother     Diabetes Mother     Heart Disease Mother     Cancer Father      Brain    Heart Disease Brother     Hypertension Brother     Diabetes Maternal Grandmother     Cancer Maternal Grandmother     Heart Disease Maternal Grandmother     Hypertension Brother     Hypertension Sister     Diabetes Sister     Hypertension Sister     Hypertension Sister     Glaucoma Brother        ALLERGIES  Allergies   Allergen Reactions    Dilaudid [Hydromorphone (Bulk)] Unknown (comments)     Respiratory suppression leading to intubation       CURRENT MEDS  Current Outpatient Prescriptions   Medication Sig Dispense Refill    methylPREDNISolone (MEDROL DOSEPACK) 4 mg tablet Per dose pack instructions 1 Dose Pack 0    pravastatin (PRAVACHOL) 10 mg tablet TAKE 1 TABLET BY MOUTH ONCE DAILY MUST  MAKE  AN  APPOINTMENT  WITH  DOCTOR  FOR  FURTHER  REFILLS 30 Tab 0    hydroCHLOROthiazide (HYDRODIURIL) 25 mg tablet TAKE ONE TABLET BY MOUTH ONCE DAILY 30 Tab 3    amLODIPine-benazepril (LOTREL) 10-40 mg per capsule TAKE ONE CAPSULE BY MOUTH ONCE DAILY 90 Cap 0    topiramate (TOPAMAX) 50 mg tablet TAKE ONE TABLET BY MOUTH TWICE DAILY, may taper to half a tablet bid for 1 mo, the half a tablet at night for 1 mo then stop 180 Tab 0    naproxen sodium 220 mg cap Take 220 Caps by mouth daily as needed.  potassium chloride SR (KLOR-CON 10) 10 mEq tablet Take 1 Tab by mouth two (2) times a day.  (Patient taking differently: Take 10 mEq by mouth two (2) times a day. Takes 2 bid) 180 Tab 1    metFORMIN ER (GLUCOPHAGE XR) 500 mg tablet Takes 1 BID  0    levothyroxine (SYNTHROID) 100 mcg tablet Take 1 Tab by mouth Daily (before breakfast). 14 Tab 0    aspirin 81 mg chewable tablet Take 1 Tab by mouth daily. 30 Tab 11    cholecalciferol, vitamin D3, (VITAMIN D3) 2,000 unit Tab Take 2 Caps by mouth.  FOLIC ACID/MV,FE,OTHER MIN (CENTRUM PO) Take 1 Tab by mouth daily. REVIEW OF SYSTEMS:     Y  N       Y  N  Y  N   Y  N  [] [x] AIDS          [] [x] Falls  [] [x] Memory Loss  [] [x]  Shortness of breath  [] [x] Anxiety          [] [x] Fatigue [x] [] Muscle Pain        [] [x]  Skipped beats  [] [x] Chest Pain   [x] [] Frequent HA [x] [] Ms Weakness     [x] []  Snoring  [] [x] Constipation [] [x]Hearing loss [] [x] Nause/Vomiting  [] [x]  Stomach Pain  [] [x] Cough          [] [x]Hepatitis [] [x] Neuropathy         [] [x]  Swallowing difficulty  [] [x] Depression  [] [x]Incontinence [] [x] Poor appetite      [] [x]  Vertigo  [] [x] Diarrhea       [x] [] Joint Pain [] [x] Rash                   [] [x]  Visual disturbances  [] [x] Fainting        [x] [] Leg Swelling [] [x] Ringing ears       [] [x]  Weight changes      []Unable to obtain  ROS due to  []mental status change  []sedated   []intubated          PREVIOUS WORKUP  IMAGING: CT head: Negative  (I personally reviewed these images in PACS and this is my impression)    LABS  Results for orders placed or performed in visit on 04/12/18   CBC WITH AUTOMATED DIFF   Result Value Ref Range    WBC 7.0 3.4 - 10.8 x10E3/uL    RBC 4.08 3.77 - 5.28 x10E6/uL    HGB 12.1 11.1 - 15.9 g/dL    HCT 37.6 34.0 - 46.6 %    MCV 92 79 - 97 fL    MCH 29.7 26.6 - 33.0 pg    MCHC 32.2 31.5 - 35.7 g/dL    RDW 14.2 12.3 - 15.4 %    PLATELET 866 448 - 566 x10E3/uL    NEUTROPHILS 60 Not Estab. %    Lymphocytes 31 Not Estab. %    MONOCYTES 6 Not Estab. %    EOSINOPHILS 2 Not Estab. %    BASOPHILS 1 Not Estab. %    ABS. NEUTROPHILS 4.2 1.4 - 7.0 x10E3/uL    Abs Lymphocytes 2.2 0.7 - 3.1 x10E3/uL    ABS. MONOCYTES 0.4 0.1 - 0.9 x10E3/uL    ABS. EOSINOPHILS 0.2 0.0 - 0.4 x10E3/uL    ABS. BASOPHILS 0.0 0.0 - 0.2 x10E3/uL    IMMATURE GRANULOCYTES 0 Not Estab. %    ABS. IMM. GRANS. 0.0 0.0 - 0.1 x10E3/uL   LIPID PANEL   Result Value Ref Range    Cholesterol, total 183 100 - 199 mg/dL    Triglyceride 70 0 - 149 mg/dL    HDL Cholesterol 65 >39 mg/dL    VLDL, calculated 14 5 - 40 mg/dL    LDL, calculated 104 (H) 0 - 99 mg/dL   METABOLIC PANEL, COMPREHENSIVE   Result Value Ref Range    Glucose 77 65 - 99 mg/dL    BUN 21 6 - 24 mg/dL    Creatinine 0.70 0.57 - 1.00 mg/dL    GFR est non- >59 mL/min/1.73    GFR est  >59 mL/min/1.73    BUN/Creatinine ratio 30 (H) 9 - 23    Sodium 147 (H) 134 - 144 mmol/L    Potassium 3.6 3.5 - 5.2 mmol/L    Chloride 107 (H) 96 - 106 mmol/L    CO2 24 18 - 29 mmol/L    Calcium 9.3 8.7 - 10.2 mg/dL    Protein, total 6.8 6.0 - 8.5 g/dL    Albumin 4.3 3.5 - 5.5 g/dL    GLOBULIN, TOTAL 2.5 1.5 - 4.5 g/dL    A-G Ratio 1.7 1.2 - 2.2    Bilirubin, total <0.2 0.0 - 1.2 mg/dL    Alk. phosphatase 70 39 - 117 IU/L    AST (SGOT) 15 0 - 40 IU/L    ALT (SGPT) 11 0 - 32 IU/L   HEMOGLOBIN A1C WITH EAG   Result Value Ref Range    Hemoglobin A1c 5.5 4.8 - 5.6 %    Estimated average glucose 111 mg/dL       PHYSICAL EXAM  Visit Vitals    Ht 5' 4\" (1.626 m)    Wt 86.7 kg (191 lb 3.2 oz)    BMI 32.82 kg/m2     General:  Alert, cooperative, no distress. Head:  Normocephalic, without obvious abnormality, atraumatic. Eyes:  Conjunctivae/corneas clear. Pupils equal, round, reactive to light. Extraocular movements intact, VFF, NO papilledema   Lungs:  Heart:   Non labored breathing  Regular rate and rhythm, no carotid bruits   Abdomen:   Soft, non-distended   Extremities: Extremities normal, atraumatic, no cyanosis or edema. Pulses: 2+ and symmetric all extremities.    Skin: Skin color, texture, turgor normal. No rashes or lesions. Neurologic:  Gen: Attention normal             Language: naming, repetition, fluency normal             Memory: intact recent and remote memory  Cranial Nerves:  I: smell Not tested   II: visual fields Full to confrontation   II: pupils Equal, round, reactive to light   II: optic disc No papilledema   III,VII: ptosis none   III,IV,VI: extraocular muscles  Full ROM   V: mastication normal   V: facial light touch sensation  normal   VII: facial muscle function   symmetric   VIII: hearing symmetric   IX: soft palate elevation  normal   XI: trapezius strength  5/5   XI: sternocleidomastoid strength 5/5   XI: neck flexion strength  5/5   XII: tongue  midline     Motor: normal bulk and tone, no tremor              Strength:    DELT BICEP TRICEP IHM IP QUAD HAMSTR GASTROC TA   RIGHT 5/5 5/5 5/5 5/5 5/5 5/5 5/5 5/5 5/5   LEFT 5/5 5/5 5/5 3/5 5/5 5/5 5/5 5/5 5/5       Sensory: intact to LT, PP, vibration, and temperature  Coordination: FTN intact, Rhomberg negative  Gait: normal gait including tandem   Reflexes: 2+ throughout       [facility] is a 56 y.o. female who presents for evaluation of left arm numbness and weakness. On exam patient has a distribution of C7 through T1 on her symptoms. Will do EMG/NCS to evaluate further. Also given her stroke risk factors with an MRI of the brain to rule out any central etiology. Will do a Medrol Dosepak today to help patient with her numbness/pain. Will not do any neuropathic pain medication at this time. RECOMMENDATIONS  1. Medrol Dosepak  2. Reviewed records from [facility]  3. EMG/NCS of left arm  4. MRI of the brain with and without contrast  5. Patient lives in [facility] next would like to follow-up with [facility] [address] neurology. Will arrange this with Maria Esther Chakraborty after testing    Willa Ho MD    CC: Mario Nunez NP  Fax: [phone]    Medications and side effects discussed with patient in detail.   With any new medications prescribed, patient was given instructions on administration and side effects. Written medication information was provided to the patient as well. This note was created using voice recognition software. Despite editing, there may be syntax errors. This note will not be viewable in [address]

## 2018-05-25 NOTE — LETTER
Dear Karla Garcia NP, Thank you for allowing me to see your patient, Yajaira Villa for a neurological consultation. Please see my impression and recommendations as outlined in my note. Sincerely, Kelvin Collins MD 
[facility] at [address] [place] [address] is a 56 y.o. female Chief Complaint Patient presents with  New Patient  
  np cervical nerve root impigiment seb 1. Have you been to the ER, urgent care clinic since your last visit? Hospitalized since your last visit? no 
 
2. Have you seen or consulted any other health care providers outside of the [address] [place] [address] since your last visit? Include any pap smears or colon screening. no 
 
Visit Vitals  Ht 5' 4\" (1.626 m) NEUROLOGY NEW PATIENT CONSULTATION 
 
REFERRED BY: 
Karla Garcia NP 
 
CHIEF COMPLAINT: 
Left arm numbness and weakness HISTORY OF PRESENT ILLNESS HISTORY PROVIDED BY: 
Patient Family Member: Spouse Yajaira Villa is a 56 y.o. female who I am asked to see in consultation for left arm numbness and weakness. Patient reports that on May 6 she was at home when she developed severe chest pain and then radiating pain down her left arm to her hand. She then had a tingling sensation and felt like her hand went completely numb. Subsequently she had weakness in the hand. Patient reports that she was seen at [address]. She had a CT of the head neck and chest that were negative. She was told she had degenerative disc disease. She also and x-rays were normal.  And an EKG that was normal.  She has not prescribed any medications at discharge from the ER. She followed up with her PCP recommended to follow with neurology. Patient is continued to have numbness in the hand and cannot . The pain is not as severe. She will occasionally have cramping. She could see some muscle twitching. She denies any symptoms on the right side or on her legs. She has no prior history of neurology problems. No family history of neurologic problems. She does have stroke risk factors of hypertension, hyperlipidemia, and this metabolic syndrome. She does take aspirin 81 mg daily. 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 [facility] Past Medical History:  
Diagnosis Date  CAD (coronary artery disease) 10/2013  Hypertension  Hyperthyroidism  Hypokalemia  Menopause  Other ill-defined conditions(799.89) MIGRAINE SUÁREZ,  Thyroid disease GRAVES  
 Unspecified sleep apnea DOES NOT USE C-PAP HAS AN ORDER FOR IT  
 Vitamin D deficiency [address] M[address] Social History Social History  Marital status: SINGLE Spouse name: N/A  
 Number of children: N/A  
 Years of education: N/A Social History Main Topics  Smoking status: Current Every Day Smoker Packs/day: 1.00 Years: 10.00 Last attempt to quit: 10/12/2013  Smokeless tobacco: Never Used  Alcohol use 0.0 oz/week  
  0 Standard drinks or equivalent per week  Drug use: No  
 Sexual activity: Not on file Other Topics Concern  Not on file Social History Narrative Donavan Tobar Family History Problem Relation Age of Onset  Kidney Disease Mother  Hypertension Mother  Diabetes Mother  Heart Disease Mother  Cancer Father Brain  Heart Disease Brother  Hypertension Brother  Diabetes Maternal Grandmother  Cancer Maternal Grandmother  Heart Disease Maternal Grandmother  Hypertension Brother  Hypertension Sister  Diabetes Sister  Hypertension Sister  Hypertension Sister  Glaucoma Brother ALLERGIES Allergies Allergen Reactions  Dilaudid [Hydromorphone (Bulk)] Unknown (comments) Respiratory suppression leading to intubation CURRENT MEDS Current Outpatient Prescriptions Medication Sig Dispense Refill  methylPREDNISolone (MEDROL DOSEPACK) 4 mg tablet Per dose pack instructions 1 Dose Pack 0  
 pravastatin (PRAVACHOL) 10 mg tablet TAKE 1 TABLET BY MOUTH ONCE DAILY MUST  MAKE  AN  APPOINTMENT  WITH  DOCTOR  FOR  FURTHER  REFILLS 30 Tab 0  
 hydroCHLOROthiazide (HYDRODIURIL) 25 mg tablet TAKE ONE TABLET BY MOUTH ONCE DAILY 30 Tab 3  
 amLODIPine-benazepril (LOTREL) 10-40 mg per capsule TAKE ONE CAPSULE BY MOUTH ONCE DAILY 90 Cap 0  
 topiramate (TOPAMAX) 50 mg tablet TAKE ONE TABLET BY MOUTH TWICE DAILY, may taper to half a tablet bid for 1 mo, the half a tablet at night for 1 mo then stop 180 Tab 0  
 naproxen sodium 220 mg cap Take 220 Caps by mouth daily as needed.  potassium chloride SR (KLOR-CON 10) 10 mEq tablet Take 1 Tab by mouth two (2) times a day. (Patient taking differently: Take 10 mEq by mouth two (2) times a day. Takes 2 bid) 180 Tab 1  
 metFORMIN ER (GLUCOPHAGE XR) 500 mg tablet Takes 1 BID  0  
 levothyroxine (SYNTHROID) 100 mcg tablet Take 1 Tab by mouth Daily (before breakfast). 14 Tab 0  
 aspirin 81 mg chewable tablet Take 1 Tab by mouth daily. 30 Tab 11  
 cholecalciferol, vitamin D3, (VITAMIN D3) 2,000 unit Tab Take 2 Caps by mouth.  FOLIC ACID/MV,FE,OTHER MIN (CENTRUM PO) Take 1 Tab by mouth daily. REVIEW OF SYSTEMS:  
 
Y  N       Y  N  Y  N   Y  N 
[] [x] AIDS          [] [x] Falls  [] [x] Memory Loss  [] [x]  Shortness of breath 
[] [x] Anxiety          [] [x] Fatigue [x] [] Muscle Pain        [] [x]  Skipped beats 
[] [x] Chest Pain   [x] [] Frequent HA [x] [] Ms Weakness     [x] []  Snoring 
[] [x] Constipation [] [x]Hearing loss [] [x] Nause/Vomiting  [] [x]  Stomach Pain 
[] [x] Cough          [] [x]Hepatitis [] [x] Neuropathy         [] [x]  Swallowing difficulty 
[] [x] Depression  [] [x]Incontinence [] [x] Poor appetite      [] [x]  Vertigo 
[] [x] Diarrhea       [x] [] Joint Pain [] [x] Rash                   [] [x]  Visual disturbances [] [x] Fainting        [x] [] Leg Swelling [] [x] Ringing ears       [] [x]  Weight changes []Unable to obtain  ROS due to  []mental status change  []sedated   []intubated PREVIOUS WORKUP IMAGING: CT head: Negative (I personally reviewed these images in PACS and this is my impression) LABS Results for orders placed or performed in visit on 04/12/18 CBC WITH AUTOMATED DIFF Result Value Ref Range WBC 7.0 3.4 - 10.8 x10E3/uL  
 RBC 4.08 3.77 - 5.28 x10E6/uL HGB 12.1 11.1 - 15.9 g/dL HCT 37.6 34.0 - 46.6 % MCV 92 79 - 97 fL  
 MCH 29.7 26.6 - 33.0 pg  
 MCHC 32.2 31.5 - 35.7 g/dL  
 RDW 14.2 12.3 - 15.4 % PLATELET 847 996 - 646 x10E3/uL NEUTROPHILS 60 Not Estab. % Lymphocytes 31 Not Estab. % MONOCYTES 6 Not Estab. % EOSINOPHILS 2 Not Estab. % BASOPHILS 1 Not Estab. %  
 ABS. NEUTROPHILS 4.2 1.4 - 7.0 x10E3/uL Abs Lymphocytes 2.2 0.7 - 3.1 x10E3/uL  
 ABS. MONOCYTES 0.4 0.1 - 0.9 x10E3/uL  
 ABS. EOSINOPHILS 0.2 0.0 - 0.4 x10E3/uL  
 ABS. BASOPHILS 0.0 0.0 - 0.2 x10E3/uL IMMATURE GRANULOCYTES 0 Not Estab. %  
 ABS. IMM. GRANS. 0.0 0.0 - 0.1 x10E3/uL LIPID PANEL Result Value Ref Range Cholesterol, total 183 100 - 199 mg/dL Triglyceride 70 0 - 149 mg/dL HDL Cholesterol 65 >39 mg/dL VLDL, calculated 14 5 - 40 mg/dL LDL, calculated 104 (H) 0 - 99 mg/dL METABOLIC PANEL, COMPREHENSIVE Result Value Ref Range Glucose 77 65 - 99 mg/dL BUN 21 6 - 24 mg/dL Creatinine 0.70 0.57 - 1.00 mg/dL GFR est non- >59 mL/min/1.73 GFR est  >59 mL/min/1.73  
 BUN/Creatinine ratio 30 (H) 9 - 23 Sodium 147 (H) 134 - 144 mmol/L Potassium 3.6 3.5 - 5.2 mmol/L Chloride 107 (H) 96 - 106 mmol/L  
 CO2 24 18 - 29 mmol/L Calcium 9.3 8.7 - 10.2 mg/dL Protein, total 6.8 6.0 - 8.5 g/dL Albumin 4.3 3.5 - 5.5 g/dL GLOBULIN, TOTAL 2.5 1.5 - 4.5 g/dL A-G Ratio 1.7 1.2 - 2.2 Bilirubin, total <0.2 0.0 - 1.2 mg/dL Alk. phosphatase 70 39 - 117 IU/L  
 AST (SGOT) 15 0 - 40 IU/L  
 ALT (SGPT) 11 0 - 32 IU/L HEMOGLOBIN A1C WITH EAG Result Value Ref Range Hemoglobin A1c 5.5 4.8 - 5.6 % Estimated average glucose 111 mg/dL PHYSICAL EXAM 
Visit Vitals  Ht 5' 4\" (1.626 m)  Wt 86.7 kg (191 lb 3.2 oz)  BMI 32.82 kg/m2 General:  Alert, cooperative, no distress. Head:  Normocephalic, without obvious abnormality, atraumatic. Eyes:  Conjunctivae/corneas clear. Pupils equal, round, reactive to light. Extraocular movements intact, VFF, NO papilledema Lungs: 
Heart:   Non labored breathing Regular rate and rhythm, no carotid bruits Abdomen:   Soft, non-distended Extremities: Extremities normal, atraumatic, no cyanosis or edema. Pulses: 2+ and symmetric all extremities. Skin: Skin color, texture, turgor normal. No rashes or lesions. Neurologic:  Gen: Attention normal 
           Language: naming, repetition, fluency normal 
           Memory: intact recent and remote memory Cranial Nerves: 
I: smell Not tested II: visual fields Full to confrontation II: pupils Equal, round, reactive to light II: optic disc No papilledema III,VII: ptosis none III,IV,VI: extraocular muscles  Full ROM V: mastication normal  
V: facial light touch sensation  normal  
VII: facial muscle function   symmetric VIII: hearing symmetric IX: soft palate elevation  normal  
XI: trapezius strength  5/5 XI: sternocleidomastoid strength 5/5 XI: neck flexion strength  5/5 XII: tongue  midline Motor: normal bulk and tone, no tremor Strength:  
 DELT BICEP TRICEP IHM IP QUAD HAMSTR GASTROC TA  
RIGHT 5/5 5/5 5/5 5/5 5/5 5/5 5/5 5/5 5/5 LEFT 5/5 5/5 5/5 3/5 5/5 5/5 5/5 5/5 5/5 Sensory: intact to LT, PP, vibration, and temperature Coordination: FTN intact, Rhomberg negative Gait: normal gait including tandem Reflexes: 2+ throughout IMPRESSION 
 Cristóbal Montejo is a 56 y.o. female who presents for evaluation of left arm numbness and weakness. On exam patient has a distribution of C7 through T1 on her symptoms. Will do EMG/NCS to evaluate further. Also given her stroke risk factors with an MRI of the brain to rule out any central etiology. Will do a Medrol Dosepak today to help patient with her numbness/pain. Will not do any neuropathic pain medication at this time. RECOMMENDATIONS 1. Medrol Dosepak 2. Reviewed records from [facility] 3. EMG/NCS of left arm 4. MRI of the brain with and without contrast 
5. Patient lives in [facility] next would like to follow-up with [facility] [address] neurology. Will arrange this with Shawnee HARTMAN after testing Tod Lvoe MD 
 
CC: Neli Whipple NP Fax: [phone] Medications and side effects discussed with patient in detail. With any new medications prescribed, patient was given instructions on administration and side effects. Written medication information was provided to the patient as well. This note was created using voice recognition software. Despite editing, there may be syntax errors. This note will not be viewable in [address]

## 2018-05-27 RX ORDER — PRAVASTATIN SODIUM 10 MG/1
TABLET ORAL
Qty: 30 TAB | Refills: 0 | Status: SHIPPED | OUTPATIENT
Start: 2018-05-27 | End: 2018-06-26 | Stop reason: SDUPTHER

## 2018-05-29 ENCOUNTER — TELEPHONE (OUTPATIENT)
Dept: NEUROLOGY | Age: 48
End: 2018-05-29

## 2018-05-29 NOTE — TELEPHONE ENCOUNTER
Left message for patient that I have received a message from Dr. Tasha Wright to schedule you an appointment with Dr. Simon Vital for an EMG. I have reschedule patient for June 5, 2018 to arrive at  9:40 for a 10 am. Left message on phone number [phone]. Will send patient a reminder.

## 2018-05-30 NOTE — TELEPHONE ENCOUNTER
Spoke with patient and I have reschedule her appointment for June 12, 2018 to arrive at 10:40am for a 11:00 am appointment.

## 2018-05-30 NOTE — TELEPHONE ENCOUNTER
----- Message from Missael Nathan sent at 5/30/2018 10:35 AM EDT -----  Regarding: Dr. Alvina Gill stated she missed the nurse call and requested a call back. Best contact number [phone].

## 2018-05-30 NOTE — TELEPHONE ENCOUNTER
----- Message from Erika Angel sent at 5/30/2018  9:24 AM EDT -----  Regarding: Dr. Radha Obrien  Pt missed a call from the practice and is requesting for another attempt to be made [phone].

## 2018-06-08 ENCOUNTER — TELEPHONE (OUTPATIENT)
Dept: NEUROLOGY | Age: 48
End: 2018-06-08

## 2018-06-08 NOTE — TELEPHONE ENCOUNTER
----- Message from Evita Zimmerman sent at 6/8/2018  3:38 PM EDT -----  Regarding: Dr. Jenelle Baeza  Pt is returning a call to the nurse. Pt would like a call back from the nurse. Pt can be reached at [phone], if there is no answer please call the home number [phone].

## 2018-06-08 NOTE — TELEPHONE ENCOUNTER
Spoke with patient regarding confirming her appointment for her EMG on Tuesday June 12th. Patient has questions regarding her MRI. I asked patient to call back to Dr. Livier Jorge 's office to speak with Dr. Anglin Moder nurse regarding MRI. Patient understood.

## 2018-06-12 ENCOUNTER — OFFICE VISIT (OUTPATIENT)
Dept: NEUROLOGY | Age: 48
End: 2018-06-12

## 2018-06-12 ENCOUNTER — TELEPHONE (OUTPATIENT)
Dept: FAMILY MEDICINE CLINIC | Age: 48
End: 2018-06-12

## 2018-06-12 VITALS
HEART RATE: 61 BPM | SYSTOLIC BLOOD PRESSURE: 135 MMHG | BODY MASS INDEX: 32.99 KG/M2 | OXYGEN SATURATION: 98 % | DIASTOLIC BLOOD PRESSURE: 80 MMHG | WEIGHT: 192.2 LBS

## 2018-06-12 DIAGNOSIS — R20.0 LEFT ARM NUMBNESS: Primary | ICD-10-CM

## 2018-06-12 DIAGNOSIS — R29.898 LEFT ARM WEAKNESS: ICD-10-CM

## 2018-06-12 NOTE — TELEPHONE ENCOUNTER
SW Ms Kleber Sabina to inform her that I had over looked the Referral order and that Bina Garcia had done it and that the nurse from our office should be calling her soon with the details. I SW our Referral Coordinator Bina Alfaro and she stated that she had referred it to Laina Pineda, [address] [address] [place] [address]. I Pedro Luis Rossi and she stated that she will [facility] to check the status of the Referral order.

## 2018-06-12 NOTE — PROCEDURES
[facility] at [facility]        Tel: [phone] ? Fax: [phone]    ELECTRODIAGNOSTIC REPORT      Test Date:  2018    Patient:  Holley Reid : 1970 Physician: Raj Fink   ID#: [id number] SEX: Female Ref. Phys: Sobia Martinez M.D. Patient History / Exam:  Left arm numbness and weakness    EMG & NCV Findings:  Sensory and motor nerve conduction studies (as indicated in the tables) were within reference of normal.     All F Wave latencies were within normal limits. Disposable concentric needle EMG (as indicated in the table) showed no evidence of electrical instability. Impression: This study is normal. There is no electrodiagnostic evidence of an entrapment neuropathy, generalized neuropathy, myopathy or significant cervical radiculopathy at this time. Thank you for the consult.     Raj Fink MD    Nerve Conduction Studies  Anti Sensory Summary Table     Stim Site NR Peak (ms) Norm Peak (ms) P-T Amp (µV) Norm P-T Amp Site1 Site2 Dist (cm)   Left Median Anti Sensory (2nd Digit)   Wrist    3.1 <4 67.5 >13 Wrist 2nd Digit 14.0   Left Radial Anti Sensory (Base 1st Digit)   Wrist    2.2 <2.8 48.4 >11 Wrist Base 1st Digit 10.0   Left Ulnar Anti Sensory (5th Digit)   Wrist    3.4 <4.0 33.9 >9 Wrist 5th Digit 14.0     Ortho Sensory Summary Table     Stim Site NR Peak (ms) P-T Amp (µV) Site1 Site2 Dist (cm) Mateo (m/s)   Left Median Ortho Sensory (Wrist)   Palm    1.8 59.4 Palm Wrist 0.0    Left Ulnar Ortho Sensory (Wrist)   Palm    2.0 19.0 Palm Wrist 0.0      Motor Summary Table     Stim Site NR Onset (ms) Norm Onset (ms) O-P Amp (mV) Norm O-P Amp P-T Amp (mV) Site1 Site2 Dist (cm) Mateo (m/s)   Left Median Motor (Abd Poll Brev)   Wrist    3.2 <4.5 10.6 >4.1 15.0 Wrist Abd Poll Brev 8.0    Elbow    7.0  10.1  14.4 Elbow Wrist 22.0 58   Left Ulnar Motor (Abd Dig Minimi)   Wrist    2.4 <3.1 10.4 >7.0 14.9 Wrist Abd Dig Minimi 8.0    B Elbow    6.1  9.6  14.7 B Elbow Wrist 21.0 57   A Elbow    7.7  9.4  14.5 A Elbow B Elbow 10.0 62     F Wave Studies     NR F-Lat (ms) Lat Norm (ms) L-R F-Lat (ms) L-R Lat Norm   Left Ulnar (Mrkrs) (Abd Dig Min)      27.07 <32  <2.5       EMG     Side Muscle Nerve Root Ins Act Fibs Psw Recrt Duration Amp Poly Comment   Left Deltoid Axillary C5-6 Nml Nml Nml Nml Nml Nml Nml    Left Triceps Radial C6-7-8 Nml Nml Nml Nml Nml Nml Nml    Left Biceps Musculocut C5-6 Nml Nml Nml Nml Nml Nml Nml    Left FlexCarRad Median C6-7 Nml Nml Nml Nml Nml Nml Nml    Left 1stDorInt Ulnar C8-T1 Nml Nml Nml Nml Nml Nml Nml    Left Lower Cerv Parasp Rami C7,T1 Nml Nml Nml Nml Nml Nml Nml    Left Mid Cerv Parasp Rami C5,6 Nml Nml Nml Nml Nml Nml Nml      Waveforms:

## 2018-06-12 NOTE — TELEPHONE ENCOUNTER
SW patient and she stated that she is still waiting for the second Mammogram Referral.   I stated to her that I did not see one at this point, but will check with Anabell Mejía and give her a call back.

## 2018-06-13 ENCOUNTER — TELEPHONE (OUTPATIENT)
Dept: NEUROLOGY | Age: 48
End: 2018-06-13

## 2018-06-13 NOTE — TELEPHONE ENCOUNTER
Talked with Steve Moore (is on Patient hippa). They went to see Dr. Milena Wolfe yesterday and patient does not have a follow-up with us. She was wondering if you wanted one.

## 2018-06-13 NOTE — TELEPHONE ENCOUNTER
----- Message from Amirah Hidalgo MD sent at 6/8/2018 12:36 PM EDT -----  Attempted to call patient had to leave a voicemail. I wanted to let her know that her MRI showed some small lesions. It looks like this is similar to her previous MRI in 2014. I would like her to bring a copy of her disc to her next neurology appointment with Dr. Ailyn Yeboah so that he can review the images.

## 2018-06-14 NOTE — TELEPHONE ENCOUNTER
From my understanding the patient went to follow at [address] [place] [address] due to location. If that is the case she would need to follow-up there with Dr. Haylie Hamilton is low.

## 2018-06-18 ENCOUNTER — TELEPHONE (OUTPATIENT)
Dept: FAMILY MEDICINE CLINIC | Age: 48
End: 2018-06-18

## 2018-06-18 NOTE — TELEPHONE ENCOUNTER
Partner says a phone message was left with the date, time and location of the mammogram and could not understand the message. I don't see anything in the chart or in the order. She seems to think it's tomorrow. I spoke to Ghazala Hurtado LPN, (she's at [facility]) and found on her desk the order and information. Partner called back and found the information and was advised by GEO Corona, that they need to  the disc at [facility] before going to [place].

## 2018-06-18 NOTE — TELEPHONE ENCOUNTER
Partner found the information given by phone last week by Britton Aviles LPN, about the scheduled mammogram.  Partner advised that they need to  the films as well.

## 2018-06-20 ENCOUNTER — DOCUMENTATION ONLY (OUTPATIENT)
Dept: FAMILY MEDICINE CLINIC | Age: 48
End: 2018-06-20

## 2018-06-20 DIAGNOSIS — Z12.39 SCREENING FOR BREAST CANCER: ICD-10-CM

## 2018-06-20 DIAGNOSIS — N63.0 BREAST LUMP OR MASS: ICD-10-CM

## 2018-06-20 NOTE — PROGRESS NOTES
[address] [facility] MAMMOGRAM APPT: Tuesday; June 19, 2018 @  1:30 pm  Special instructions:  NO powders; NO deodorant; NO perfumes   Wear comfortable clothes. Patient will need to bring films of past mammogram from [facility] to appointment.

## 2018-06-26 RX ORDER — PRAVASTATIN SODIUM 10 MG/1
TABLET ORAL
Qty: 30 TAB | Refills: 0 | Status: SHIPPED | OUTPATIENT
Start: 2018-06-26 | End: 2018-09-25 | Stop reason: SDUPTHER

## 2018-06-26 RX ORDER — AMLODIPINE BESYLATE AND BENAZEPRIL HYDROCHLORIDE 10; 40 MG/1; MG/1
CAPSULE ORAL
Qty: 90 CAP | Refills: 0 | Status: SHIPPED | OUTPATIENT
Start: 2018-06-26 | End: 2018-09-25 | Stop reason: SDUPTHER

## 2018-09-07 RX ORDER — PRAVASTATIN SODIUM 10 MG/1
TABLET ORAL
Qty: 30 TAB | Refills: 0 | OUTPATIENT
Start: 2018-09-07

## 2018-12-27 PROBLEM — E66.01 SEVERE OBESITY (HCC): Status: ACTIVE | Noted: 2018-12-27

## 2019-10-11 ENCOUNTER — HOSPITAL ENCOUNTER (OUTPATIENT)
Dept: NUCLEAR MEDICINE | Age: 49
Discharge: HOME | End: 2019-10-11
Attending: INTERNAL MEDICINE
Payer: COMMERCIAL

## 2019-10-11 VITALS — BODY MASS INDEX: 33.99 KG/M2 | WEIGHT: 198 LBS

## 2019-10-11 DIAGNOSIS — R10.11 RUQ PAIN: ICD-10-CM

## 2019-10-11 DIAGNOSIS — Z12.11 COLON CANCER SCREENING: ICD-10-CM

## 2019-10-11 DIAGNOSIS — R10.13 EPIGASTRIC PAIN: ICD-10-CM

## 2019-10-11 DIAGNOSIS — R93.5 ABNORMAL ULTRASOUND OF ABDOMEN: ICD-10-CM

## 2019-10-11 PROCEDURE — 74011250636 HC RX REV CODE- 250/636: Performed by: INTERNAL MEDICINE

## 2019-10-11 PROCEDURE — 78227 HEPATOBIL SYST IMAGE W/DRUG: CPT

## 2019-10-11 RX ADMIN — SINCALIDE 1.8 MCG: 5 INJECTION, POWDER, LYOPHILIZED, FOR SOLUTION INTRAVENOUS at 10:46

## 2019-10-30 ENCOUNTER — OFFICE VISIT (OUTPATIENT)
Dept: SURGERY | Age: 49
End: 2019-10-30

## 2019-10-30 VITALS
DIASTOLIC BLOOD PRESSURE: 78 MMHG | BODY MASS INDEX: 33.51 KG/M2 | OXYGEN SATURATION: 95 % | HEIGHT: 64 IN | TEMPERATURE: 99 F | HEART RATE: 75 BPM | WEIGHT: 196.3 LBS | SYSTOLIC BLOOD PRESSURE: 123 MMHG

## 2019-10-30 DIAGNOSIS — K82.8 BILIARY DYSKINESIA: Primary | ICD-10-CM

## 2019-10-30 RX ORDER — AMOXICILLIN 500 MG/1
CAPSULE ORAL
Refills: 0 | COMMUNITY
Start: 2019-10-22 | End: 2020-05-06 | Stop reason: ALTCHOICE

## 2019-10-30 RX ORDER — OMEPRAZOLE 20 MG/1
CAPSULE, DELAYED RELEASE ORAL
Refills: 0 | COMMUNITY
Start: 2019-10-22 | End: 2022-09-15

## 2019-10-30 RX ORDER — CLARITHROMYCIN 500 MG/1
TABLET, FILM COATED ORAL
Refills: 0 | COMMUNITY
Start: 2019-10-22 | End: 2020-01-22

## 2019-10-30 NOTE — H&P (VIEW-ONLY)
HISTORY OF PRESENT ILLNESS Robbert Osler is a 51 y.o. female who comes in for consultation by Mckenna Herrera NP for abdominal pain and low GB ejection fraction HPI She has been having intermittent abdominal pain and bloating associated with nausea and diarrhea for a long time. She has been on a PPI without improvement. Symptoms usually occur after eating. She had a GI work up which was negative. US noted a fatty liver and the GB was suboptimally distended but there were no large stones. She had a HIDA with an EF of 13%. She is now having a loss of appetite. Past Medical History:  
Diagnosis Date  CAD (coronary artery disease) 10/2013  Colon polyps  Fatty liver  GERD (gastroesophageal reflux disease)  Graves disease   
 hypothyroid now since procedure  H. pylori infection  Headache   
 sometimes migraines  Hypertension  Hypokalemia  Menopause  Nausea & vomiting  Pneumothorax ~2012  Sleep apnea   
 cpap compliant  Vitamin D deficiency Past Surgical History:  
Procedure Laterality Date  CARDIAC SURG PROCEDURE UNLIST  10-13 CARDIAC CATH  
 HX BILATERAL SALPINGO-OOPHORECTOMY  HX COLONOSCOPY    
 HX ENDOSCOPY    
 HX HEENT Radioactive thyroid  HX HYSTERECTOMY PARTIAL Family History Problem Relation Age of Onset  Kidney Disease Mother  Hypertension Mother  Diabetes Mother  Heart Disease Mother  Cancer Father Brain  Heart Disease Brother  Hypertension Brother  Diabetes Maternal Grandmother  Cancer Maternal Grandmother  Heart Disease Maternal Grandmother  Hypertension Brother  Hypertension Sister  Diabetes Sister  Hypertension Sister  Hypertension Sister  Glaucoma Brother Social History Tobacco Use  Smoking status: Current Every Day Smoker Packs/day: 1.00 Years: 10.00 Pack years: 10.00 Last attempt to quit: 10/12/2013 Years since quittin.0  Smokeless tobacco: Never Used Substance Use Topics  Alcohol use: Yes Alcohol/week: 0.0 standard drinks Comment: some weekends one or two beers  Drug use: No  
 
Current Outpatient Medications Medication Sig  MULTIVITAMIN PO Take  by mouth daily.  omeprazole (PRILOSEC) 20 mg capsule TAKE 1 CAPSULE BY MOUTH TWICE DAILY  clarithromycin (BIAXIN) 500 mg tablet TAKE 1 TABLET BY MOUTH TWICE DAILY  amoxicillin (AMOXIL) 500 mg capsule Indications: inflammation of the stomach lining caused by H. pylori  pravastatin (PRAVACHOL) 10 mg tablet TAKE 1 TABLET BY MOUTH ONCE DAILY  amLODIPine-benazepril (LOTREL) 10-40 mg per capsule TAKE 1 CAPSULE BY MOUTH ONCE DAILY  metFORMIN ER (GLUCOPHAGE XR) 500 mg tablet TAKE 1 TABLET BY MOUTH TWICE DAILY  hydroCHLOROthiazide (HYDRODIURIL) 25 mg tablet TAKE ONE TABLET BY MOUTH ONCE DAILY  levothyroxine (SYNTHROID) 88 mcg tablet Take 1 Tab by mouth Daily (before breakfast).  potassium chloride SR (KLOR-CON 10) 10 mEq tablet Take 1 Tab by mouth two (2) times a day. (Patient taking differently: Take 99 mEq by mouth two (2) times a day. Takes 2 bid)  aspirin 81 mg chewable tablet Take 1 Tab by mouth daily.  cholecalciferol, vitamin D3, (VITAMIN D3) 2,000 unit Tab Take 2 Caps by mouth daily.  ibuprofen (MOTRIN) 200 mg tablet Take  by mouth. 2 tabs BID PRN  
 fluticasone propionate (FLONASE) 50 mcg/actuation nasal spray 2 sprays per nostril per day  Indications: inflammation of the nose due to an allergy  cetirizine (ZYRTEC) 10 mg tablet Take 1 Tab by mouth daily as needed for Allergies. Take at bedtime. Indications: inflammation of the nose due to an allergy  naproxen sodium 220 mg cap Take 220 Caps by mouth daily as needed. No current facility-administered medications for this visit. Facility-Administered Medications Ordered in Other Visits Medication Dose Route Frequency  [START ON 11/4/2019] alcohol 62% (NOZIN) nasal  3 Ampule  3 Ampule Topical ONCE Allergies Allergen Reactions  Dilaudid [Hydromorphone (Bulk)] Unknown (comments) Respiratory suppression leading to intubation Review of Systems Constitutional: Negative for chills, diaphoresis, fever and weight loss. HENT: Negative for sore throat. Eyes: Negative for blurred vision and discharge. Respiratory: Negative for cough, shortness of breath and wheezing. Cardiovascular: Negative for chest pain, palpitations, orthopnea, claudication and leg swelling. Gastrointestinal: Positive for abdominal pain, diarrhea, heartburn and nausea. Negative for constipation, melena and vomiting. Genitourinary: Negative for dysuria, flank pain, frequency and hematuria. Musculoskeletal: Negative for back pain, joint pain, myalgias and neck pain. Skin: Negative for rash. Neurological: Positive for headaches. Negative for dizziness, speech change, focal weakness, seizures, loss of consciousness and weakness. Endo/Heme/Allergies: Does not bruise/bleed easily. Psychiatric/Behavioral: Negative for depression and memory loss. Visit Vitals /78 (BP 1 Location: Left arm, BP Patient Position: Sitting) Pulse 75 Temp 99 °F (37.2 °C) (Oral) Ht 5' 4\" (1.626 m) Wt 89 kg (196 lb 4.8 oz) SpO2 95% BMI 33.69 kg/m² Physical Exam  
Constitutional: She is oriented to person, place, and time. She appears well-developed and well-nourished. No distress. HENT:  
Head: Normocephalic and atraumatic. Mouth/Throat: Oropharynx is clear and moist. No oropharyngeal exudate. Eyes: Pupils are equal, round, and reactive to light. Conjunctivae and EOM are normal. No scleral icterus. Neck: Normal range of motion. Neck supple. No JVD present. No tracheal deviation present. No thyromegaly present. Cardiovascular: Normal rate and regular rhythm. Exam reveals no gallop and no friction rub. No murmur heard. Pulmonary/Chest: Effort normal and breath sounds normal. No respiratory distress. She has no wheezes. She has no rales. Abdominal: Soft. Bowel sounds are normal. She exhibits no distension and no mass. There is no hepatosplenomegaly. There is no tenderness. There is no rebound, no guarding, no CVA tenderness and negative Martinez's sign. No hernia. Hernia confirmed negative in the ventral area. Musculoskeletal: Normal range of motion. She exhibits no edema. Lymphadenopathy:  
  She has no cervical adenopathy. Neurological: She is alert and oriented to person, place, and time. No cranial nerve deficit. Skin: Skin is warm and dry. No rash noted. She is not diaphoretic. No erythema. No pallor. Psychiatric: Her behavior is normal. Judgment and thought content normal.  
 
 
ASSESSMENT and PLAN 1. Biliary dyskinesia. I explained the anatomy and pathophysiology of biliary tract disease and cholecystitis, pancreatitis, cholangitis, choledocholithiasis. I explained about laparoscopic possible open cholecystectomy with possible cholangiogram and the risks of surgery including but not limited to bleeding, infection, bile duct or bowel injury, hernia development, retained common duct stones requiring further therapy, non resolution of symptoms, post cholecystectomy diarrhea, DVT, and risks of general anesthesia. 2.   NIDDM type 2. Followed by Dr Katy Little 3. Hx Graves diseas s/p MATHIS and now with hypothyroidism. On LT4 
4. Essential hypertension. Stable on rx 5. Hypercholesterolemia. On statin The patient wishes to proceed with a laparoscopic possible open cholecystectomy with cholangiogram under general anesthesia as an outpatient Ash Li MD FACS

## 2019-10-30 NOTE — PROGRESS NOTES
HISTORY OF PRESENT ILLNESS  Karley Hale is a 51 y.o. female who comes in for consultation by Jumana Ramirez NP for abdominal pain and low GB ejection fraction  HPI  She has been having intermittent abdominal pain and bloating associated with nausea and diarrhea for a long time. She has been on a PPI without improvement. Symptoms usually occur after eating. She had a GI work up which was negative. US noted a fatty liver and the GB was suboptimally distended but there were no large stones. She had a HIDA with an EF of 13%. She is now having a loss of appetite.     Past Medical History:   Diagnosis Date    CAD (coronary artery disease) 10/2013    Colon polyps     Fatty liver     GERD (gastroesophageal reflux disease)     Graves disease     hypothyroid now since procedure    H. pylori infection     Headache     sometimes migraines    Hypertension     Hypokalemia     Menopause     Nausea & vomiting     Pneumothorax ~2012    Sleep apnea     cpap compliant    Vitamin D deficiency      Past Surgical History:   Procedure Laterality Date    CARDIAC SURG PROCEDURE UNLIST  10-13    CARDIAC CATH    HX BILATERAL SALPINGO-OOPHORECTOMY      HX COLONOSCOPY      HX ENDOSCOPY      HX HEENT      Radioactive thyroid    HX HYSTERECTOMY      PARTIAL     Family History   Problem Relation Age of Onset    Kidney Disease Mother     Hypertension Mother     Diabetes Mother     Heart Disease Mother     Cancer Father         Brain    Heart Disease Brother     Hypertension Brother     Diabetes Maternal Grandmother     Cancer Maternal Grandmother     Heart Disease Maternal Grandmother     Hypertension Brother     Hypertension Sister     Diabetes Sister     Hypertension Sister     Hypertension Sister     Glaucoma Brother      Social History     Tobacco Use    Smoking status: Current Every Day Smoker     Packs/day: 1.00     Years: 10.00     Pack years: 10.00     Last attempt to quit: 10/12/2013     Years since quittin.0    Smokeless tobacco: Never Used   Substance Use Topics    Alcohol use: Yes     Alcohol/week: 0.0 standard drinks     Comment: some weekends one or two beers    Drug use: No     Current Outpatient Medications   Medication Sig    MULTIVITAMIN PO Take  by mouth daily.  omeprazole (PRILOSEC) 20 mg capsule TAKE 1 CAPSULE BY MOUTH TWICE DAILY    clarithromycin (BIAXIN) 500 mg tablet TAKE 1 TABLET BY MOUTH TWICE DAILY    amoxicillin (AMOXIL) 500 mg capsule Indications: inflammation of the stomach lining caused by H. pylori    pravastatin (PRAVACHOL) 10 mg tablet TAKE 1 TABLET BY MOUTH ONCE DAILY    amLODIPine-benazepril (LOTREL) 10-40 mg per capsule TAKE 1 CAPSULE BY MOUTH ONCE DAILY    metFORMIN ER (GLUCOPHAGE XR) 500 mg tablet TAKE 1 TABLET BY MOUTH TWICE DAILY    hydroCHLOROthiazide (HYDRODIURIL) 25 mg tablet TAKE ONE TABLET BY MOUTH ONCE DAILY    levothyroxine (SYNTHROID) 88 mcg tablet Take 1 Tab by mouth Daily (before breakfast).  potassium chloride SR (KLOR-CON 10) 10 mEq tablet Take 1 Tab by mouth two (2) times a day. (Patient taking differently: Take 99 mEq by mouth two (2) times a day. Takes 2 bid)    aspirin 81 mg chewable tablet Take 1 Tab by mouth daily.  cholecalciferol, vitamin D3, (VITAMIN D3) 2,000 unit Tab Take 2 Caps by mouth daily.  ibuprofen (MOTRIN) 200 mg tablet Take  by mouth. 2 tabs BID PRN    fluticasone propionate (FLONASE) 50 mcg/actuation nasal spray 2 sprays per nostril per day  Indications: inflammation of the nose due to an allergy    cetirizine (ZYRTEC) 10 mg tablet Take 1 Tab by mouth daily as needed for Allergies. Take at bedtime. Indications: inflammation of the nose due to an allergy    naproxen sodium 220 mg cap Take 220 Caps by mouth daily as needed. No current facility-administered medications for this visit.       Facility-Administered Medications Ordered in Other Visits   Medication Dose Route Frequency    [START ON 2019] alcohol 62% (NOZIN) nasal  3 Ampule  3 Ampule Topical ONCE     Allergies   Allergen Reactions    Dilaudid [Hydromorphone (Bulk)] Unknown (comments)     Respiratory suppression leading to intubation       Review of Systems   Constitutional: Negative for chills, diaphoresis, fever and weight loss. HENT: Negative for sore throat. Eyes: Negative for blurred vision and discharge. Respiratory: Negative for cough, shortness of breath and wheezing. Cardiovascular: Negative for chest pain, palpitations, orthopnea, claudication and leg swelling. Gastrointestinal: Positive for abdominal pain, diarrhea, heartburn and nausea. Negative for constipation, melena and vomiting. Genitourinary: Negative for dysuria, flank pain, frequency and hematuria. Musculoskeletal: Negative for back pain, joint pain, myalgias and neck pain. Skin: Negative for rash. Neurological: Positive for headaches. Negative for dizziness, speech change, focal weakness, seizures, loss of consciousness and weakness. Endo/Heme/Allergies: Does not bruise/bleed easily. Psychiatric/Behavioral: Negative for depression and memory loss. Visit Vitals  /78 (BP 1 Location: Left arm, BP Patient Position: Sitting)   Pulse 75   Temp 99 °F (37.2 °C) (Oral)   Ht 5' 4\" (1.626 m)   Wt 89 kg (196 lb 4.8 oz)   SpO2 95%   BMI 33.69 kg/m²       Physical Exam   Constitutional: She is oriented to person, place, and time. She appears well-developed and well-nourished. No distress. HENT:   Head: Normocephalic and atraumatic. Mouth/Throat: Oropharynx is clear and moist. No oropharyngeal exudate. Eyes: Pupils are equal, round, and reactive to light. Conjunctivae and EOM are normal. No scleral icterus. Neck: Normal range of motion. Neck supple. No JVD present. No tracheal deviation present. No thyromegaly present. Cardiovascular: Normal rate and regular rhythm. Exam reveals no gallop and no friction rub.    No murmur heard.  Pulmonary/Chest: Effort normal and breath sounds normal. No respiratory distress. She has no wheezes. She has no rales. Abdominal: Soft. Bowel sounds are normal. She exhibits no distension and no mass. There is no hepatosplenomegaly. There is no tenderness. There is no rebound, no guarding, no CVA tenderness and negative Martinez's sign. No hernia. Hernia confirmed negative in the ventral area. Musculoskeletal: Normal range of motion. She exhibits no edema. Lymphadenopathy:     She has no cervical adenopathy. Neurological: She is alert and oriented to person, place, and time. No cranial nerve deficit. Skin: Skin is warm and dry. No rash noted. She is not diaphoretic. No erythema. No pallor. Psychiatric: Her behavior is normal. Judgment and thought content normal.       ASSESSMENT and PLAN  1. Biliary dyskinesia. I explained the anatomy and pathophysiology of biliary tract disease and cholecystitis, pancreatitis, cholangitis, choledocholithiasis. I explained about laparoscopic possible open cholecystectomy with possible cholangiogram and the risks of surgery including but not limited to bleeding, infection, bile duct or bowel injury, hernia development, retained common duct stones requiring further therapy, non resolution of symptoms, post cholecystectomy diarrhea, DVT, and risks of general anesthesia. 2.   NIDDM type 2. Followed by Dr Vivek Argueta    3. Hx Graves diseas s/p MATHIS and now with hypothyroidism. On LT4  4. Essential hypertension. Stable on rx  5. Hypercholesterolemia.   On statin    The patient wishes to proceed with a laparoscopic possible open cholecystectomy with cholangiogram under general anesthesia as an outpatient      Sherry Otto MD FACS

## 2019-10-30 NOTE — PERIOP NOTES
Called to Weston Arias at [facility], labs dated in September are fine to use, pt does not need to be brought in.

## 2019-10-30 NOTE — PATIENT INSTRUCTIONS
Surgery Instruction Sheet    You have been scheduled for surgery on 11/04/2019 at 2:20pm at [address]. Please report to the [address] at 1:05pm, this is approximately 1 hours prior to your surgery time and is subject to change. The [facility] is located next to [facility]. You may need to have a Pre-op Visit prior to your surgery if so PAT Nurses will call you to schedule. Bring a list of medications and your insurance cards with you. You may eat/drink prior to this visit. Call your physician immediately if you notice a change in your health between the time you saw your physician and the day of surgery. If you take a blood thinner, please let us know. Call your ordering Doctor to make sure you can stop taking it prior to your surgery. STOP YOUR ASPIRIN 10 DAYS PRIOR TO SURGERY. DO NOT TAKE  IBUPROFEN, ADVIL, MOTRIN, ALEVE, EXCEDRIN, BC POWDER, GOODIES, FISH OIL OR ANY MEDICATION CONTAINING ASPIRIN 10 DAYS PRIOR TO YOUR SURGERY. MAY TAKE TYLENOL. Eat a light dinner the evening before your surgery. DO NOT EAT OR DRINK ANYTHING AFTER MIDNIGHT THE NIGHT BEFORE YOUR SURGERY. This includes water, chewing gum, lifesavers, etc.  The Pre op nurse will check with you about any medication that you may need to take the morning of surgery. Shower with a new bar of anti-bacterial soap (Dial, Safeguard) or solution given to you by Pre-op, the night before surgery. Do not use lotion, powder, deodorant on the skin after showering. Wear loose, comfortable clothing the day of surgery and bring a container to store your contacts, eyeglasses, dentures, hearing aid, etc.  Do not bring money, valuables, jewelry, etc. to the hospital.      If you are having outpatient surgery, someone must come with you the morning of surgery and stay with you to drive you home. You can not drive for 24 hours after any anesthesia.   Sometimes it is necessary to stay overnight and leave the next morning. This is still considered outpatient for most insurance deductibles. Someone will still need to drive you home. If you have questions or concerns, please feel free to call Dr Louann Cosby at [phone]. If you need to cancel your surgery, please call as soon as possible.

## 2019-10-30 NOTE — PROGRESS NOTES
Chief Complaint   Patient presents with    Abdominal Pain     SEEN AT THE REQUEST OF DR. KWAN DAS, FOR GALLBLADDER     1. Have you been to the ER, urgent care clinic since your last visit? Hospitalized since your last visit? NO    2. Have you seen or consulted any other health care providers outside of the [address] [place] [address] since your last visit? Include any pap smears or colon screening.  NO

## 2019-10-31 NOTE — PERIOP NOTES
[facility]  Ambulatory Surgery Unit  Pre-operative Instructions    Surgery/Procedure Date  Monday, November 4, 2019            Tentative Arrival Time 8026      1. On the day of your surgery/procedure, please report to the Ambulatory Surgery Unit Registration Desk and sign in at your designated time. The Ambulatory Surgery Unit is located in [facility] on the [facility] side of the [facility] across from the [address] [place] [address]. Please have all of your health insurance cards and a photo ID. 2. You must have someone with you to drive you home, as you should not drive a car for 24 hours following anesthesia. Please make arrangements for a responsible adult friend or family member to stay with you for at least the first 24 hours after your surgery. 3. Do not have anything to eat or drink (including water, gum, mints, coffee, juice) after 11:59 PM, Sunday. This may not apply to medications prescribed by your physician. (Please note below the special instructions with medications to take the morning of surgery, if applicable.)    4. We recommend you do not drink any alcoholic beverages for 24 hours before and after your surgery. 5. Contact your surgeons office for instructions on the following medications: non-steroidal anti-inflammatory drugs (i.e. Advil, Aleve), vitamins, and supplements. (Some surgeons will want you to stop these medications prior to surgery and others may allow you to take them)   **If you are currently taking Plavix, Coumadin, Aspirin and/or other blood-thinning agents, contact your surgeon for instructions. ** Your surgeon will partner with the physician prescribing these medications to determine if it is safe to stop or if you need to continue taking. Please do not stop taking these medications without instructions from your surgeon.     6. In an effort to help prevent surgical site infection, we ask that you shower with an anti-bacterial soap (i.e. Dial/Safeguard, or the soap provided to you at your preadmission testing appointment) for 3 days prior to and on the morning of surgery, using a fresh towel after each shower. (Please begin this process with fresh bed linens.) Do not apply any lotions, powders, or deodorants after the shower on the day of your procedure. If applicable, please do not shave the operative site for 48 hours prior to surgery. 7. Wear comfortable clothes. Wear glasses instead of contacts. Do not bring any jewelry or money (other than copays or fees as instructed). Do not wear make-up, particularly mascara, the morning of your surgery. Do not wear nail polish, particularly if you are having foot /hand surgery. Wear your hair loose or down, no ponytails, buns, aiyana pins or clips. All body piercings must be removed. 8. You should understand that if you do not follow these instructions your surgery may be cancelled. If your physical condition changes (i.e. fever, cold or flu) please contact your surgeon as soon as possible. 9. It is important that you be on time. If a situation occurs where you may be late, or if you have any questions or problems, please call [phone].    10. Your surgery time may be subject to change. You will receive a phone call the day prior to surgery to confirm your arrival time. 11. Pediatric patients: please bring a change of clothes, diapers, bottle/sippy cup, pacifier, etc.      Special Instructions: Take all medications and inhalers, as prescribed, on the morning of surgery with a sip of water EXCEPT: no diabetic medications day of surgery. I understand a pre-operative phone call will be made to verify my surgery time. In the event that I am not available, I give permission for a message to be left on my answering service and/or with another person?       yes    Preop instructions reviewed  Pt verbalized understanding.      ___________________      ___________________ ________________  (Signature of Patient)          (Witness)                   (Date and Time)

## 2019-11-01 ENCOUNTER — ANESTHESIA EVENT (OUTPATIENT)
Dept: SURGERY | Age: 49
End: 2019-11-01
Payer: COMMERCIAL

## 2019-11-03 PROBLEM — K82.8 BILIARY DYSKINESIA: Status: ACTIVE | Noted: 2019-11-03

## 2019-11-04 ENCOUNTER — ANESTHESIA (OUTPATIENT)
Dept: SURGERY | Age: 49
End: 2019-11-04
Payer: COMMERCIAL

## 2019-11-04 ENCOUNTER — APPOINTMENT (OUTPATIENT)
Dept: GENERAL RADIOLOGY | Age: 49
End: 2019-11-04
Attending: SURGERY
Payer: COMMERCIAL

## 2019-11-04 ENCOUNTER — HOSPITAL ENCOUNTER (OUTPATIENT)
Age: 49
Setting detail: OUTPATIENT SURGERY
Discharge: HOME | End: 2019-11-04
Attending: SURGERY | Admitting: SURGERY
Payer: COMMERCIAL

## 2019-11-04 VITALS
HEIGHT: 64 IN | SYSTOLIC BLOOD PRESSURE: 108 MMHG | BODY MASS INDEX: 32.78 KG/M2 | DIASTOLIC BLOOD PRESSURE: 61 MMHG | TEMPERATURE: 98 F | WEIGHT: 192 LBS | HEART RATE: 76 BPM | RESPIRATION RATE: 16 BRPM | OXYGEN SATURATION: 97 %

## 2019-11-04 DIAGNOSIS — K82.8 BILIARY DYSKINESIA: Primary | ICD-10-CM

## 2019-11-04 PROCEDURE — 77030020255 HC SOL INJ LR 1000ML BG: Performed by: SURGERY

## 2019-11-04 PROCEDURE — 88304 TISSUE EXAM BY PATHOLOGIST: CPT

## 2019-11-04 PROCEDURE — 74011000250 HC RX REV CODE- 250: Performed by: NURSE ANESTHETIST, CERTIFIED REGISTERED

## 2019-11-04 PROCEDURE — 77030013079 HC BLNKT BAIR HGGR 3M -A: Performed by: ANESTHESIOLOGY

## 2019-11-04 PROCEDURE — 77030018836 HC SOL IRR NACL ICUM -A: Performed by: SURGERY

## 2019-11-04 PROCEDURE — 77030026438 HC STYL ET INTUB CARD -A: Performed by: ANESTHESIOLOGY

## 2019-11-04 PROCEDURE — 77030020829: Performed by: SURGERY

## 2019-11-04 PROCEDURE — 76060000061 HC AMB SURG ANES 0.5 TO 1 HR: Performed by: SURGERY

## 2019-11-04 PROCEDURE — 77030008771 HC TU NG SALEM SUMP -A: Performed by: SURGERY

## 2019-11-04 PROCEDURE — 74011636320 HC RX REV CODE- 636/320: Performed by: SURGERY

## 2019-11-04 PROCEDURE — 77030021352 HC CBL LD SYS DISP COVD -B: Performed by: SURGERY

## 2019-11-04 PROCEDURE — 77030031139 HC SUT VCRL2 J&J -A: Performed by: SURGERY

## 2019-11-04 PROCEDURE — 77030008756 HC TU IRR SUC STRY -B: Performed by: SURGERY

## 2019-11-04 PROCEDURE — 74011250637 HC RX REV CODE- 250/637

## 2019-11-04 PROCEDURE — 77030019908 HC STETH ESOPH SIMS -A: Performed by: ANESTHESIOLOGY

## 2019-11-04 PROCEDURE — 77030008684 HC TU ET CUF COVD -B: Performed by: ANESTHESIOLOGY

## 2019-11-04 PROCEDURE — 74011250637 HC RX REV CODE- 250/637: Performed by: SURGERY

## 2019-11-04 PROCEDURE — 74300 X-RAY BILE DUCTS/PANCREAS: CPT

## 2019-11-04 PROCEDURE — 76210000036 HC AMBSU PH I REC 1.5 TO 2 HR: Performed by: SURGERY

## 2019-11-04 PROCEDURE — 74011250636 HC RX REV CODE- 250/636: Performed by: SURGERY

## 2019-11-04 PROCEDURE — 74011250636 HC RX REV CODE- 250/636

## 2019-11-04 PROCEDURE — 77030012770 HC TRCR OPT FX AMR -B: Performed by: SURGERY

## 2019-11-04 PROCEDURE — 77030021158 HC TRCR BLN GELPRT AMR -B: Performed by: SURGERY

## 2019-11-04 PROCEDURE — 74011000250 HC RX REV CODE- 250: Performed by: SURGERY

## 2019-11-04 PROCEDURE — 76030000000 HC AMB SURG OR TIME 0.5 TO 1: Performed by: SURGERY

## 2019-11-04 PROCEDURE — 77030011640 HC PAD GRND REM COVD -A: Performed by: SURGERY

## 2019-11-04 PROCEDURE — 74011000250 HC RX REV CODE- 250: Performed by: ANESTHESIOLOGY

## 2019-11-04 PROCEDURE — 77030010513 HC APPL CLP LIG J&J -C: Performed by: SURGERY

## 2019-11-04 PROCEDURE — 74011250636 HC RX REV CODE- 250/636: Performed by: ANESTHESIOLOGY

## 2019-11-04 PROCEDURE — 74011250636 HC RX REV CODE- 250/636: Performed by: NURSE ANESTHETIST, CERTIFIED REGISTERED

## 2019-11-04 PROCEDURE — 77030008771 HC TU NG SALEM SUMP -A: Performed by: ANESTHESIOLOGY

## 2019-11-04 PROCEDURE — 76210000050 HC AMBSU PH II REC 0.5 TO 1 HR: Performed by: SURGERY

## 2019-11-04 RX ORDER — ONDANSETRON 2 MG/ML
INJECTION INTRAMUSCULAR; INTRAVENOUS AS NEEDED
Status: DISCONTINUED | OUTPATIENT
Start: 2019-11-04 | End: 2019-11-04 | Stop reason: HOSPADM

## 2019-11-04 RX ORDER — MORPHINE SULFATE 10 MG/ML
2 INJECTION, SOLUTION INTRAMUSCULAR; INTRAVENOUS
Status: DISCONTINUED | OUTPATIENT
Start: 2019-11-04 | End: 2019-11-04 | Stop reason: HOSPADM

## 2019-11-04 RX ORDER — NEOSTIGMINE METHYLSULFATE 1 MG/ML
INJECTION INTRAVENOUS AS NEEDED
Status: DISCONTINUED | OUTPATIENT
Start: 2019-11-04 | End: 2019-11-04 | Stop reason: HOSPADM

## 2019-11-04 RX ORDER — OXYCODONE AND ACETAMINOPHEN 5; 325 MG/1; MG/1
1 TABLET ORAL
Qty: 15 TAB | Refills: 0 | Status: SHIPPED | OUTPATIENT
Start: 2019-11-04 | End: 2019-11-09

## 2019-11-04 RX ORDER — SUCCINYLCHOLINE CHLORIDE 20 MG/ML
INJECTION INTRAMUSCULAR; INTRAVENOUS AS NEEDED
Status: DISCONTINUED | OUTPATIENT
Start: 2019-11-04 | End: 2019-11-04 | Stop reason: HOSPADM

## 2019-11-04 RX ORDER — FENTANYL CITRATE 50 UG/ML
INJECTION, SOLUTION INTRAMUSCULAR; INTRAVENOUS AS NEEDED
Status: DISCONTINUED | OUTPATIENT
Start: 2019-11-04 | End: 2019-11-04 | Stop reason: HOSPADM

## 2019-11-04 RX ORDER — FENTANYL CITRATE 50 UG/ML
25 INJECTION, SOLUTION INTRAMUSCULAR; INTRAVENOUS
Status: COMPLETED | OUTPATIENT
Start: 2019-11-04 | End: 2019-11-04

## 2019-11-04 RX ORDER — SODIUM CHLORIDE 0.9 % (FLUSH) 0.9 %
5-40 SYRINGE (ML) INJECTION AS NEEDED
Status: DISCONTINUED | OUTPATIENT
Start: 2019-11-04 | End: 2019-11-04 | Stop reason: HOSPADM

## 2019-11-04 RX ORDER — LIDOCAINE HYDROCHLORIDE 20 MG/ML
INJECTION, SOLUTION EPIDURAL; INFILTRATION; INTRACAUDAL; PERINEURAL AS NEEDED
Status: DISCONTINUED | OUTPATIENT
Start: 2019-11-04 | End: 2019-11-04 | Stop reason: HOSPADM

## 2019-11-04 RX ORDER — IBUPROFEN 800 MG/1
800 TABLET ORAL
Qty: 30 TAB | Refills: 0 | Status: SHIPPED | OUTPATIENT
Start: 2019-11-04 | End: 2019-11-14

## 2019-11-04 RX ORDER — OXYCODONE HYDROCHLORIDE 5 MG/1
5 TABLET ORAL
Status: COMPLETED | OUTPATIENT
Start: 2019-11-04 | End: 2019-11-04

## 2019-11-04 RX ORDER — SODIUM CHLORIDE, SODIUM LACTATE, POTASSIUM CHLORIDE, CALCIUM CHLORIDE 600; 310; 30; 20 MG/100ML; MG/100ML; MG/100ML; MG/100ML
25 INJECTION, SOLUTION INTRAVENOUS CONTINUOUS
Status: DISCONTINUED | OUTPATIENT
Start: 2019-11-04 | End: 2019-11-04 | Stop reason: HOSPADM

## 2019-11-04 RX ORDER — DIPHENHYDRAMINE HYDROCHLORIDE 50 MG/ML
12.5 INJECTION, SOLUTION INTRAMUSCULAR; INTRAVENOUS AS NEEDED
Status: DISCONTINUED | OUTPATIENT
Start: 2019-11-04 | End: 2019-11-04 | Stop reason: HOSPADM

## 2019-11-04 RX ORDER — DEXAMETHASONE SODIUM PHOSPHATE 4 MG/ML
INJECTION, SOLUTION INTRA-ARTICULAR; INTRALESIONAL; INTRAMUSCULAR; INTRAVENOUS; SOFT TISSUE AS NEEDED
Status: DISCONTINUED | OUTPATIENT
Start: 2019-11-04 | End: 2019-11-04 | Stop reason: HOSPADM

## 2019-11-04 RX ORDER — GLYCOPYRROLATE 0.2 MG/ML
INJECTION INTRAMUSCULAR; INTRAVENOUS AS NEEDED
Status: DISCONTINUED | OUTPATIENT
Start: 2019-11-04 | End: 2019-11-04 | Stop reason: HOSPADM

## 2019-11-04 RX ORDER — ACETAMINOPHEN 10 MG/ML
INJECTION, SOLUTION INTRAVENOUS
Status: COMPLETED
Start: 2019-11-04 | End: 2019-11-04

## 2019-11-04 RX ORDER — MIDAZOLAM HYDROCHLORIDE 1 MG/ML
INJECTION, SOLUTION INTRAMUSCULAR; INTRAVENOUS AS NEEDED
Status: DISCONTINUED | OUTPATIENT
Start: 2019-11-04 | End: 2019-11-04 | Stop reason: HOSPADM

## 2019-11-04 RX ORDER — SODIUM CHLORIDE 0.9 % (FLUSH) 0.9 %
5-40 SYRINGE (ML) INJECTION EVERY 8 HOURS
Status: DISCONTINUED | OUTPATIENT
Start: 2019-11-04 | End: 2019-11-04 | Stop reason: HOSPADM

## 2019-11-04 RX ORDER — KETOROLAC TROMETHAMINE 30 MG/ML
INJECTION, SOLUTION INTRAMUSCULAR; INTRAVENOUS AS NEEDED
Status: DISCONTINUED | OUTPATIENT
Start: 2019-11-04 | End: 2019-11-04 | Stop reason: HOSPADM

## 2019-11-04 RX ORDER — OXYCODONE HYDROCHLORIDE 5 MG/1
TABLET ORAL
Status: COMPLETED
Start: 2019-11-04 | End: 2019-11-04

## 2019-11-04 RX ORDER — PROPOFOL 10 MG/ML
INJECTION, EMULSION INTRAVENOUS AS NEEDED
Status: DISCONTINUED | OUTPATIENT
Start: 2019-11-04 | End: 2019-11-04 | Stop reason: HOSPADM

## 2019-11-04 RX ORDER — ACETAMINOPHEN 10 MG/ML
1000 INJECTION, SOLUTION INTRAVENOUS ONCE
Status: COMPLETED | OUTPATIENT
Start: 2019-11-04 | End: 2019-11-04

## 2019-11-04 RX ORDER — ROCURONIUM BROMIDE 10 MG/ML
INJECTION, SOLUTION INTRAVENOUS AS NEEDED
Status: DISCONTINUED | OUTPATIENT
Start: 2019-11-04 | End: 2019-11-04 | Stop reason: HOSPADM

## 2019-11-04 RX ORDER — CEFAZOLIN SODIUM/WATER 2 G/20 ML
2 SYRINGE (ML) INTRAVENOUS ONCE
Status: COMPLETED | OUTPATIENT
Start: 2019-11-04 | End: 2019-11-04

## 2019-11-04 RX ORDER — OXYCODONE AND ACETAMINOPHEN 5; 325 MG/1; MG/1
1 TABLET ORAL
Status: DISCONTINUED | OUTPATIENT
Start: 2019-11-04 | End: 2019-11-04 | Stop reason: HOSPADM

## 2019-11-04 RX ORDER — LIDOCAINE HYDROCHLORIDE 10 MG/ML
0.1 INJECTION, SOLUTION EPIDURAL; INFILTRATION; INTRACAUDAL; PERINEURAL AS NEEDED
Status: DISCONTINUED | OUTPATIENT
Start: 2019-11-04 | End: 2019-11-04 | Stop reason: HOSPADM

## 2019-11-04 RX ORDER — BUPIVACAINE HYDROCHLORIDE AND EPINEPHRINE 2.5; 5 MG/ML; UG/ML
INJECTION, SOLUTION EPIDURAL; INFILTRATION; INTRACAUDAL; PERINEURAL AS NEEDED
Status: DISCONTINUED | OUTPATIENT
Start: 2019-11-04 | End: 2019-11-04 | Stop reason: HOSPADM

## 2019-11-04 RX ADMIN — KETOROLAC TROMETHAMINE 30 MG: 30 INJECTION, SOLUTION INTRAMUSCULAR; INTRAVENOUS at 14:45

## 2019-11-04 RX ADMIN — ROCURONIUM BROMIDE 5 MG: 10 INJECTION INTRAVENOUS at 14:15

## 2019-11-04 RX ADMIN — PROMETHAZINE HYDROCHLORIDE 2.5 MG: 25 INJECTION INTRAMUSCULAR; INTRAVENOUS at 15:37

## 2019-11-04 RX ADMIN — DEXAMETHASONE SODIUM PHOSPHATE 4 MG: 4 INJECTION, SOLUTION INTRAMUSCULAR; INTRAVENOUS at 14:27

## 2019-11-04 RX ADMIN — SUCCINYLCHOLINE CHLORIDE 100 MG: 20 INJECTION, SOLUTION INTRAMUSCULAR; INTRAVENOUS at 14:17

## 2019-11-04 RX ADMIN — FENTANYL CITRATE 50 MCG: 50 INJECTION, SOLUTION INTRAMUSCULAR; INTRAVENOUS at 14:39

## 2019-11-04 RX ADMIN — PROMETHAZINE HYDROCHLORIDE 2.5 MG: 25 INJECTION INTRAMUSCULAR; INTRAVENOUS at 15:46

## 2019-11-04 RX ADMIN — FENTANYL CITRATE 25 MCG: 50 INJECTION INTRAMUSCULAR; INTRAVENOUS at 15:36

## 2019-11-04 RX ADMIN — Medication 2 G: at 14:15

## 2019-11-04 RX ADMIN — Medication 3 AMPULE: at 12:50

## 2019-11-04 RX ADMIN — SODIUM CHLORIDE, SODIUM LACTATE, POTASSIUM CHLORIDE, AND CALCIUM CHLORIDE 25 ML/HR: 600; 310; 30; 20 INJECTION, SOLUTION INTRAVENOUS at 15:10

## 2019-11-04 RX ADMIN — OXYCODONE HYDROCHLORIDE 5 MG: 5 TABLET ORAL at 17:11

## 2019-11-04 RX ADMIN — FENTANYL CITRATE 25 MCG: 50 INJECTION INTRAMUSCULAR; INTRAVENOUS at 15:42

## 2019-11-04 RX ADMIN — GLYCOPYRROLATE 0.2 MG: 0.2 INJECTION, SOLUTION INTRAMUSCULAR; INTRAVENOUS at 14:18

## 2019-11-04 RX ADMIN — ACETAMINOPHEN 1000 MG: 10 INJECTION, SOLUTION INTRAVENOUS at 13:45

## 2019-11-04 RX ADMIN — NEOSTIGMINE METHYLSULFATE 3 MG: 1 INJECTION INTRAVENOUS at 14:49

## 2019-11-04 RX ADMIN — GLYCOPYRROLATE 0.4 MG: 0.2 INJECTION, SOLUTION INTRAMUSCULAR; INTRAVENOUS at 14:49

## 2019-11-04 RX ADMIN — MIDAZOLAM HYDROCHLORIDE 2 MG: 1 INJECTION, SOLUTION INTRAMUSCULAR; INTRAVENOUS at 14:09

## 2019-11-04 RX ADMIN — ROCURONIUM BROMIDE 20 MG: 10 INJECTION INTRAVENOUS at 14:27

## 2019-11-04 RX ADMIN — FENTANYL CITRATE 50 MCG: 50 INJECTION, SOLUTION INTRAMUSCULAR; INTRAVENOUS at 14:14

## 2019-11-04 RX ADMIN — MORPHINE SULFATE 1 MG: 10 INJECTION INTRAVENOUS at 16:01

## 2019-11-04 RX ADMIN — PROPOFOL 150 MG: 10 INJECTION, EMULSION INTRAVENOUS at 14:16

## 2019-11-04 RX ADMIN — MEPERIDINE HYDROCHLORIDE 12.5 MG: 25 INJECTION INTRAMUSCULAR; INTRAVENOUS; SUBCUTANEOUS at 15:35

## 2019-11-04 RX ADMIN — LIDOCAINE HYDROCHLORIDE 40 MG: 20 INJECTION, SOLUTION EPIDURAL; INFILTRATION; INTRACAUDAL; PERINEURAL at 14:16

## 2019-11-04 RX ADMIN — MEPERIDINE HYDROCHLORIDE 12.5 MG: 25 INJECTION INTRAMUSCULAR; INTRAVENOUS; SUBCUTANEOUS at 15:42

## 2019-11-04 RX ADMIN — ROCURONIUM BROMIDE 20 MG: 10 INJECTION INTRAVENOUS at 13:31

## 2019-11-04 RX ADMIN — FENTANYL CITRATE 25 MCG: 50 INJECTION INTRAMUSCULAR; INTRAVENOUS at 15:30

## 2019-11-04 RX ADMIN — SODIUM CHLORIDE, SODIUM LACTATE, POTASSIUM CHLORIDE, AND CALCIUM CHLORIDE 25 ML/HR: 600; 310; 30; 20 INJECTION, SOLUTION INTRAVENOUS at 12:52

## 2019-11-04 RX ADMIN — FENTANYL CITRATE 25 MCG: 50 INJECTION INTRAMUSCULAR; INTRAVENOUS at 15:27

## 2019-11-04 RX ADMIN — MORPHINE SULFATE 1 MG: 10 INJECTION INTRAVENOUS at 16:26

## 2019-11-04 RX ADMIN — ONDANSETRON HYDROCHLORIDE 4 MG: 2 INJECTION, SOLUTION INTRAMUSCULAR; INTRAVENOUS at 14:39

## 2019-11-04 NOTE — PERIOP NOTES
Permission received to review discharge instructions and discuss private health information with sister and friend

## 2019-11-04 NOTE — PERIOP NOTES
Pt. Alert. States pain tolerable and able to take deep breaths. Maintains Sa02 to 92% and sister will assure 5 deep breaths every 15 minutes until home and on CPAP. Discharge instructions reviewed with caregiver and patient. Allowed and answered questions. Tolerating PO fluids. Both state ready for discharge.

## 2019-11-04 NOTE — PERIOP NOTES
Kiki Quintanilla  1970  [id number]    Situation:  Verbal report given from: Cale Shields CRNA and RADHA Neal RN   Procedure: Procedure(s):  LAPAROSCOPIC CHOLECYSTECTOMY WITH CHOLANGIOGRAM    Background:    Preoperative diagnosis: BILIARY DISKENESIA    Postoperative diagnosis: BILIARY DISKENESIA    :  Dr. Sherley Rothman    Assistant(s): Circ-1: Radha Orlando RN  Radiology Technician: [facility]  Scrub Tech-1: Obinna Woo  Surg Asst-1: Sarah Elam    Specimens:   ID Type Source Tests Collected by Time Destination   1 : Gallbladder Preservative Gallbladder  Tierra Alanis MD 11/4/2019 5501 Pathology       Assessment:  Intra-procedure medications         Anesthesia gave intra-procedure sedation and medications, see anesthesia flow sheet     Intravenous fluids: LR@ KVO     Vital signs stable.  Pt sleeping-responded no pain or chill but wanted warm blanket      Recommendation:    Permission to share finding with both sisters and friend : yes

## 2019-11-04 NOTE — PERIOP NOTES
1626-Pt. Awake, moaning, c/o pain. States only a little relief from last pain dose. Vss. Medicated w/morphine 1mg iv. Will monitor. 1637-Pt. States pain is 6/10. Will monitor. 1655-Pt. States stomach is till hurting, moaning. States level 6/10. Discussed w/Dr. Matthew Bello and pt. The option of staying the night. Pt. States pain is easing off and she wants to go home. Pts. Sister and friend at bedside. Discharge instructions reviewed. Pt. Given hussein newman and will take oxycodone before leaving.

## 2019-11-04 NOTE — ANESTHESIA POSTPROCEDURE EVALUATION
Procedure(s):  LAPAROSCOPIC CHOLECYSTECTOMY WITH CHOLANGIOGRAM.    general    Anesthesia Post Evaluation      Multimodal analgesia: multimodal analgesia used between 6 hours prior to anesthesia start to PACU discharge  Patient location during evaluation: PACU  Patient participation: complete - patient participated  Level of consciousness: awake and alert  Pain management: adequate  Airway patency: patent  Anesthetic complications: no  Cardiovascular status: acceptable  Respiratory status: acceptable  Hydration status: acceptable  Post anesthesia nausea and vomiting:  none      Vitals Value Taken Time   /61 11/4/2019  5:00 PM   Temp 36.7 °C (98 °F) 11/4/2019  3:20 PM   Pulse 81 11/4/2019  5:00 PM   Resp 15 11/4/2019  5:00 PM   SpO2 92 % 11/4/2019  5:00 PM

## 2019-11-04 NOTE — ANESTHESIA PREPROCEDURE EVALUATION
Anesthetic History   No history of anesthetic complications            Review of Systems / Medical History  Patient summary reviewed, nursing notes reviewed and pertinent labs reviewed    Pulmonary        Sleep apnea: CPAP  Smoker (1 ppd)         Neuro/Psych         Headaches     Cardiovascular    Hypertension: well controlled          CAD    Exercise tolerance: >4 METS  Comments: 10/13 Cath: non-obstructive CAD, EF60%   GI/Hepatic/Renal     GERD: well controlled      Liver disease (fatty liver)     Endo/Other      Hypothyroidism: well controlled       Other Findings            Physical Exam    Airway  Mallampati: II  TM Distance: > 6 cm  Neck ROM: normal range of motion   Mouth opening: Normal     Cardiovascular    Rhythm: regular  Rate: normal         Dental  No notable dental hx       Pulmonary  Breath sounds clear to auscultation               Abdominal  GI exam deferred       Other Findings            Anesthetic Plan    ASA: 2  Anesthesia type: general          Induction: Intravenous  Anesthetic plan and risks discussed with: Patient      Giovnani preop

## 2019-11-04 NOTE — OP NOTES
Operative Note/Laparoscopic Cholecystectomy      Patient ID:   Name: [facility] Record Number: [id number]   YOB: 1970            OPERATIVE REPORT      PREOPERATIVE DIAGNOSIS:   1. Biliary dyskinesia    POSTOPERATIVE DIAGNOSIS  1. Biliary dyskinesia    OPERATIVE PROCEDURE:   1. Laparoscopic cholecystectomy with intraoperative cholangiogram    SURGEON: Fabricio Vernon MD    ASSISTANT:  Ernie Uriostegui    ANESTHESIA: General.    IMPLANTS:   none    COMPLICATIONS:   None    SPECIMENS:  1.  Gallbladder    FINDINGS:  1.  mildly diseased gallbladder  2.  normal liver  3. Normal Intraoperative cholangiogram  4.  mild adhesions around the gallbladder    ESTIMATED BLOOD LOSS: 25 mL. BRIEF HISTORY: The patient is a 51 y.o. yo female with biliary dyskinesia for cholecystectomy. The patient understood the risks and benefits  of laparoscopic cholecystectomy with possible cholangiogram including bleeding,  infection, biliary injury, bowel injury, post cholecystectomy diarrhea, and  residual stones, post operative respiratory and cardiac complications, DVT, and wishes to proceed. PROCEDURE: The patient was taken to the operating room, placed on  the operating table in the supine position and underwent general anesthesia. Afterward, the abdomen was prepped and  draped in the usual sterile fashion. After appropriate time-out 0.5%  Marcaine with epinephrine was infiltrated in the skin and subcutaneous  tissues in the periumbilical region. A curvilinear incision was made above  the umbilicus, and subcutaneous tissue dissected off bluntly. Electrocautery was used to go through midline of the fascia, and a 0 Vicryl  stay suture was placed on either side of the midline. The peritoneal cavity  was cautiously entered, and a blunt 12-mm Jaycob trocar was inserted in, CO2  insufflation begun, and 15 mmHg pressure gave good visualization of the  peritoneal cavity.  Two 5-mm trocars were placed in the upper abdomen. The  liver looked normal, and the gallbladder was moderately diseased with adhesions around it. The remaining abdominal compartment was grossly without unusual findings. The infundibulum was pulled up superior-laterally and the cystic duct  dissected out. A clip was placed at the cystic duct-infundibular junction  and a partial ductotomy performed. A cholangiogram was obtained giving good filling of the cystic duct and intra and extrahepatic ducts. There were no strictures or filling defects and contrast flowed freely into the duodenum. Two clips were placed proximally on the cystic duct and the cystic duct was divided. The cystic artery was dissected  out and 2 clips placed proximally and 1 distally, and the cystic artery was divided. Then utilizing the electrocautery,  the gallbladder was removed from the liver bed. The gallbladder was brought  out the umbilical trocar site. Reinsufflation begun. A small amount of  bleeding on the liver bed was controlled with electrocautery, and  irrigation and suctioning performed. Trocars were removed and there was no apparent bleeding internally from the trocar sites. CO2 was allowed to be evacuated from the abdominal cavity. Interrupted 0-Vicryl was used to approximate the fascia at the umbilical trocar site. Running 4-0 Vicryl used to close skin on all the incisions and a dermabond dressing was placed. Upon completion of the procedure, the needle, sponge and instrument  counts were correct x2. The patient was extubated and brought to the recovery room. The patient tolerated the procedure well.     Gracie Quiñones MD

## 2019-11-04 NOTE — PERIOP NOTES
Pt woke up in severe pain. Medicated, ice pack and bending knees. 1600 No relief from all medications. Mik up Morphine but VSS and BP and HR lower so gave just 10mg and let pt sleep. 1612 Care to TRAE Deutsch RN

## 2019-11-04 NOTE — DISCHARGE INSTRUCTIONS
Discharge Instructions:  Laparoscopic Cholecystectomy (Gallbladder Removal)  Dr. Claire Perez    Call for appointment for follow up in 2-3 weeks [phone]    Activity:    Walk regularly. No lifting more than 10 -15 pounds for 4 weeks. Light aerobic activity is okay when you feel up to it. You may resume driving in three days unless still requiring narcotics for pain. Work:    You may return to work in 1 or 2 weeks to light activity. No lifting more than 10 pounds for four weeks. Diet:    You may resume normal diet after 24 hours. Fatty foods may still cause some stomach upset. Wound Care: You have a special dressing called Dermabond. It is okay to shower and let the water run over the incisions but do not scrub the area or soak in a tub. No lotions, ointments or scratching If you have a small amount of drainage you may place a dry bandage over the wound and change it daily. If you experience a lot of drainage, develop redness around the wound, or a fever over 101 F occurs please call the office. Medications:    Resume home medications as indicated on the Medical Reconciliation form. Aspirin and Coumadin can be restarted immediately if you were taking them preoperatively. If taking Plavix do not restart it until post operative day 2. Pain medications:  Non steroidal antiinflammatories seem to work best for post surgical pain. Try these first as prescribed. A narcotic prescription will also be given for breakthrough pain. Over the counter stool softeners and laxatives may be used if needed. Narcotics and anesthesia sometimes cause nausea and vomiting. If persistent please call the office. Do not hesitate to call with questions or concerns.    >>>You received an IV form of Tylenol 1000mg (Ofirmev) during your surgery, you may take tylenol (or pain medication containing Tylenol or Acetaminophen) in 4 hours at 5:45pm.<<<    >>>You received Toradol during your surgery.  You may not take any form of NSAIDS (non steroidal anti inflammatory drugs) such as Advil, Ibuprofen, Aleve, Motrin until 8:45pm.<<<    DO NOT TAKE TYLENOL/ACETAMINOPHEN WITH PERCOCET, LORTAB, NORCO OR VICODEN. TAKE NARCOTIC PAIN MEDICATIONS WITH FOOD     Narcotics tend to be constipating, we suggest taking a stool softener such as Colace or Miralax (follow package instructions). DO NOT DRIVE WHILE TAKING NARCOTIC PAIN MEDICATIONS. DO NOT TAKE SLEEPING MEDICATIONS OR ANTIANXIETY MEDICATIONS WHILE TAKING NARCOTIC PAIN MEDICATIONS,  ESPECIALLY THE NIGHT OF ANESTHESIA! CPAP PATIENTS BE SURE TO WEAR MACHINE WHENEVER NAPPING OR SLEEPING! DISCHARGE SUMMARY from Nurse    The following personal items collected during your admission are returned to you:   Dental Appliance: Dental Appliances: None  Vision: Visual Aid: None  Hearing Aid:    Jewelry: Jewelry: None  Clothing: Clothing: With patient, Shirt, Undergarments, Footwear, Pants, Socks, Jacket/Coat, At bedside  Other Valuables: Other Valuables: None  Valuables sent to safe:        PATIENT INSTRUCTIONS:    After General Anesthesia or Intravenous Sedation, for 24 hours or while taking prescription Narcotics:        Someone should be with you for the next 24 hours. For your own safety, a responsible adult must drive you home. · Limit your activities  · Recommended activity: Rest today, up with assistance today. Do not climb stairs or shower unattended for the next 24 hours. · Please start with a soft bland diet and advance as tolerated (no nausea) to regular diet. · If you have a sore throat you should try the following: fluids, warm salt water gargles, or throat lozenges. If it does not improve after several days please follow up with your primary physician.   · Do not drive and operate hazardous machinery  · Do not make important personal or business decisions  · Do  not drink alcoholic beverages  · If you have not urinated within 8 hours after discharge, please contact your surgeon on call. Report the following to your surgeon:  · Excessive pain, swelling, redness or odor of or around the surgical area  · Temperature over 100.5  · Nausea and vomiting lasting longer than 4 hours or if unable to take medications  · Any signs of decreased circulation or nerve impairment to extremity: change in color, persistent  numbness, tingling, coldness or increase pain      · You will receive a Post Operative Call from one of the Recovery Room Nurses on the day after your surgery to check on you. It is very important for us to know how you are recovering after your surgery. If you have an issue or need to speak with someone, please call your surgeon, do not wait for the post operative call. · You may receive an e-mail or letter in the mail from [employer] regarding your experience with us in the Ambulatory Surgery Unit. Your feedback is valuable to us and we appreciate your participation in the survey. · If the above instructions are not adequate or you are having problems after your surgery, call the physician at their office number. · We wish you a speedy recovery ? What to do at Home:      *  Please give a list of your current medications to your Primary Care Provider. *  Please update this list whenever your medications are discontinued, doses are      changed, or new medications (including over-the-counter products) are added. *  Please carry medication information at all times in case of emergency situations. If you have not received your influenza and/or pneumococcal vaccine, please follow up with your primary care physician. The discharge information has been reviewed with the patient and caregiver. The patient and caregiver verbalized understanding. TO PREVENT AN INFECTION      1. [address] YOUR HANDS     To prevent infection, good handwashing is the most important thing you or your caregiver can do.        Wash your hands with soap and water or use the hand  we gave you before you touch any wounds. 2. SHOWER     Use the antibacterial soap we gave you when you take a shower.  Shower with this soap until your wounds are healed.  To reach all areas of your body, you may need someone to help you.  Dont forget to clean your belly button with every shower. 3.  USE CLEAN SHEETS     Use freshly cleaned sheets on your bed after surgery.  To keep the surgery site clean, do not allow pets to sleep with you while your wound is still healing. 4. STOP SMOKING     Stop smoking, or at least cut back on smoking     Smoking slows your healing. 5.  CONTROL YOUR BLOOD SUGAR     High blood sugars slow wound healing.  If you are diabetic, control your blood sugar levels before and after your surgery. [place] THROMBOSIS AND PULMONARY EMBOLUS    SURGICAL PATIENTS  Surgical patients are the #1 risk for DVT and PE. WHAT IS DVT? WHAT IS PE?  DVT is a serious condition where blood clots develop deep in the veins of the legs. PE occurs when a blood clot breaks loose from the wall of a vein and travels to the lungs blocking the pulmonary artery or one of its branches impairing blood flow from the heart, which could result in death.   RISK FACTORS   Surgery lasting longer than 45 minutes   History of inflammatory bowel disease   Oral contraceptive or hormone replacement therapy   Immobilization   Varicose veins / swollen legs   Smoking    CHF / Acute MI / Irregular heart beat   Family history of thrombosis   General anesthesia greater than 2 hours   Obesity   Infection of less than one month   Less than 1 month postpartum   COPD / Pneumonia   Arthroscopic surgery   Malignancy / cancer   Spine surgery   Blood abnormalities   Stroke / Paralysis / Coma    SIGNS AND SYMPTOMS OF DEEP VEIN TROMBOSIS   -  ER VISIT  Usually occurs in one leg, above or below the knee   Swelling - one calf or thigh may be larger than the other   Feeling increased warmth in the area of the leg that is swollen or painful   Leg pain, which may increase when standing or walking   Swelling along the vein of the leg   When swollen areas is pressed with a finger, a depression may remain   Tenderness of the leg that may be confined to one area   Change in leg color (bluish or red)    SIGNS AND SYMPTOMS OF PULMONARY EMBOLUS  - 911 CALL   Chest pain that gets worse with deep breath, coughing or chest movement   Coughing up blood   Sweating   Shortness of breath or difficulty breathing   Rapid heart beat   Lightheadedness    HOW TO REDUCE THE POSSIBLE RISK OF DVT   Exercise - simple activities as rotating ankles and wrists, wiggling toes and fingers, tightening and relaxing muscles in calves and thighs promotes circulation while recovering from surgery. Please do these exercises every hour during waking hours   Take mediation as prescribed by your physician (Lovenox, Coumadin, Aspirin)   Resume your normal activities as soon as your doctor advises you to do so.  Remember, when traveling, to [place] your legs frequently.         PATIENTS WHO BELIEVE THEY MAY BE EXPERIENCING SIGNS AND SYMPTOMS OF DVT OR PE SHOULD SEEK MEDICAL HELP IMMEDIATELY

## 2019-11-04 NOTE — INTERVAL H&P NOTE
H&P Update: 
Kiki Nipple was seen and examined. History and physical has been reviewed. The patient has been examined.  There have been no significant clinical changes since the completion of the originally dated History and Physical.

## 2019-11-08 ENCOUNTER — TELEPHONE (OUTPATIENT)
Dept: SURGERY | Age: 49
End: 2019-11-08

## 2019-11-08 NOTE — TELEPHONE ENCOUNTER
Patient had surgery on 11/4 and is asking if it is still normal to be having pain, nauseated and weak.   Please call patient to advise

## 2019-11-08 NOTE — TELEPHONE ENCOUNTER
Pt called for pain and nausea  Has some burning      Path benign gallbladder    Offered Adelia Street MD FACS

## 2019-11-14 ENCOUNTER — TELEPHONE (OUTPATIENT)
Dept: SURGERY | Age: 49
End: 2019-11-14

## 2019-11-14 NOTE — TELEPHONE ENCOUNTER
Spoke with patient advise her to call her PCP regarding possible UTI. She verbalized  understanding.

## 2019-11-14 NOTE — TELEPHONE ENCOUNTER
Patient thinks she has gotten a UTI from not drinking enough fluids, just had gallbladder removed last week, wanted to know if she could get a antibiotic.

## 2019-11-15 ENCOUNTER — DOCUMENTATION ONLY (OUTPATIENT)
Dept: SURGERY | Age: 49
End: 2019-11-15

## 2019-11-15 ENCOUNTER — TELEPHONE (OUTPATIENT)
Dept: SURGERY | Age: 49
End: 2019-11-15

## 2019-11-15 NOTE — PROGRESS NOTES
Leave paper work faxed to [facility] [phone]. Confirmation received. Original will be scanned.

## 2019-11-22 ENCOUNTER — OFFICE VISIT (OUTPATIENT)
Dept: SURGERY | Age: 49
End: 2019-11-22

## 2019-11-22 ENCOUNTER — DOCUMENTATION ONLY (OUTPATIENT)
Dept: SURGERY | Age: 49
End: 2019-11-22

## 2019-11-22 VITALS
WEIGHT: 194.7 LBS | OXYGEN SATURATION: 96 % | HEIGHT: 64 IN | RESPIRATION RATE: 16 BRPM | HEART RATE: 80 BPM | SYSTOLIC BLOOD PRESSURE: 120 MMHG | DIASTOLIC BLOOD PRESSURE: 75 MMHG | BODY MASS INDEX: 33.24 KG/M2 | TEMPERATURE: 97.1 F

## 2019-11-22 DIAGNOSIS — Z09 POSTOPERATIVE EXAMINATION: Primary | ICD-10-CM

## 2019-11-22 NOTE — TELEPHONE ENCOUNTER
Patient was seen by Dr Annamarie martinez morning and forgot to ask if a note can be sent to the [facility] CARE group regarding when she will return to work. Please call.

## 2019-11-22 NOTE — PROGRESS NOTES
Surgery  Follow up  Procedure: lap debo and IOC  OR date:  11/4/2019  Path:       Gallbladder, cholecystectomy:   Benign gallbladder tissue.      S I feel fine, no diarrhea    Visit Vitals  /75 (BP 1 Location: Left arm, BP Patient Position: Sitting)   Pulse 80   Temp 97.1 °F (36.2 °C)   Resp 16   Ht 5' 4\" (1.626 m)   Wt 88.3 kg (194 lb 11.2 oz)   SpO2 96%   BMI 33.42 kg/m²       O Incisions healing well without infection   No signs of hernia    A/P Doing well   RTW 12/2 due to heavy lifting at work   RTC prn    Cameron Asencio MD FACS

## 2019-11-25 ENCOUNTER — TELEPHONE (OUTPATIENT)
Dept: SURGERY | Age: 49
End: 2019-11-25

## 2019-11-25 NOTE — TELEPHONE ENCOUNTER
[place] papers and RTW letter faxed to [address] [phone]. RTW letter faxed to [phone]. Confirmation received.

## 2019-11-25 NOTE — TELEPHONE ENCOUNTER
Patient is calling stating that Lauryn Tamayo has not received her disability forms, please fax to [phone] and to [employer] [phone]. Please call patient once forms are sent.

## 2021-04-01 ENCOUNTER — VIRTUAL VISIT (OUTPATIENT)
Dept: FAMILY MEDICINE CLINIC | Age: 51
End: 2021-04-01
Payer: COMMERCIAL

## 2021-04-01 DIAGNOSIS — R53.83 OTHER FATIGUE: Primary | ICD-10-CM

## 2021-04-01 DIAGNOSIS — G44.89 OTHER HEADACHE SYNDROME: ICD-10-CM

## 2021-04-01 PROCEDURE — 99441 PR PHYS/QHP TELEPHONE EVALUATION 5-10 MIN: CPT | Performed by: NURSE PRACTITIONER

## 2021-04-01 NOTE — PROGRESS NOTES
Chief Complaint   Patient presents with    Fatigue    Headache     1. Have you been to the ER, urgent care clinic since your last visit? Hospitalized since your last visit? no    2. Have you seen or consulted any other health care providers outside of the [address] [place] [address] since your last visit? Include any pap smears or colon screening. no  Abuse Screening Questionnaire 4/1/2021   Do you ever feel afraid of your partner? N   Are you in a relationship with someone who physically or mentally threatens you? N   Is it safe for you to go home? Y     3 most recent PHQ Screens 4/1/2021   PHQ Not Done -   Little interest or pleasure in doing things Not at all   Feeling down, depressed, irritable, or hopeless Not at all   Total Score PHQ 2 0     Learning Assessment 7/31/2020   PRIMARY LEARNER Patient   HIGHEST LEVEL OF EDUCATION - PRIMARY LEARNER  GRADUATED HIGH SCHOOL OR GED   BARRIERS PRIMARY LEARNER NONE   CO-LEARNER CAREGIVER No   PRIMARY LANGUAGE ENGLISH   LEARNER PREFERENCE PRIMARY READING   ANSWERED BY Patient   RELATIONSHIP SELF     Fall Risk Assessment, last 12 mths 7/31/2020   Able to walk? Yes   Fall in past 12 months?  No

## 2021-04-02 ENCOUNTER — TELEPHONE (OUTPATIENT)
Dept: FAMILY MEDICINE CLINIC | Age: 51
End: 2021-04-02

## 2021-04-02 NOTE — PROGRESS NOTES
Juana Mills is a 53 y.o. female, evaluated via audio-only technology on 4/1/2021 for Fatigue and Headache  . Post Covid 19 immunization on Tuesday . Second shot. Developed headache ,chills and fatigue. Taking easy after trying to go to work. We discussed this is usually a normal response to the vaccine. Treat the symptoms with rest ,fluids and tylenol. Assessment & Plan:   Diagnoses and all orders for this visit:    1. Other fatigue    2. Other headache syndrome      The complexity of medical decision making for this visit is moderate         12  Subjective:       Prior to Admission medications    Medication Sig Start Date End Date Taking? Authorizing Provider   cetirizine (ZYRTEC) 10 mg tablet Take 1 Tab by mouth daily as needed for Allergies. Take at bedtime. Indications: inflammation of the nose due to an allergy 3/1/21  Yes Emery Holter, NP   hydroCHLOROthiazide (HYDRODIURIL) 25 mg tablet Take 1 tablet by mouth once daily 2/28/21  Yes Emery Holter, NP   benazepriL (LOTENSIN) 40 mg tablet TAKE 1 TABLET BY MOUTH ONCE DAILY FOR HIGH BLOOD PRESSURE 1/11/21  Yes Emery Holter, NP   amLODIPine (NORVASC) 10 mg tablet Take 1 tablet by mouth once daily 1/11/21  Yes Emery Holter, NP   POTASSIUM PO Take  by mouth. Yes Provider, Historical   methocarbamoL (ROBAXIN) 500 mg tablet Take 1 Tab by mouth three (3) times daily. Indications: muscle spasm  Patient taking differently: Take 500 mg by mouth three (3) times daily. Not a current medication  Indications: muscle spasm 9/17/20  Yes Emery Holter, NP   triamcinolone acetonide (KENALOG) 0.1 % topical cream Apply  to affected area two (2) times a day. use thin layer 7/31/20  Yes Moisés DelPAUL amaya   topiramate (TOPAMAX) 25 mg tablet TAKE 1 TABLET BY MOUTH NIGHTLY FOR  MIGRAINE  PREVENTION 6/5/20  Yes Emery Holter, NP   Wheat Dextrin (Benefiber Clear) 3 gram/3.5 gram pwpk Take 1 Each by mouth nightly.  4/20/20  Yes Emery Holter, NP   MULTIVITAMIN PO Take  by mouth daily. Yes Provider, Historical   omeprazole (PRILOSEC) 20 mg capsule Not a current medication 10/22/19  Yes Provider, Historical   pravastatin (PRAVACHOL) 10 mg tablet TAKE 1 TABLET BY MOUTH ONCE DAILY 9/29/19  Yes Melody Servin MD   levothyroxine (SYNTHROID) 88 mcg tablet Take 1 Tab by mouth Daily (before breakfast). 2/18/19  Yes Jose Avery NP   naproxen sodium 220 mg cap Take 220 Caps by mouth daily as needed. Not a current medication   Yes Provider, Historical   potassium chloride SR (KLOR-CON 10) 10 mEq tablet Take 1 Tab by mouth two (2) times a day. Patient taking differently: Take 99 mEq by mouth two (2) times a day. Takes 2 bid 11/9/15  Yes Oscar Polanco NP   aspirin 81 mg chewable tablet Take 1 Tab by mouth daily. 10/14/13  Yes Brigida Cronin MD   cholecalciferol, vitamin D3, (VITAMIN D3) 2,000 unit Tab Take 2 Caps by mouth daily. Yes Provider, Historical   metFORMIN ER (GLUCOPHAGE XR) 500 mg tablet Take 1 tablet by mouth twice daily 3/6/20   Jose Avery NP   diclofenac EC (VOLTAREN) 50 mg EC tablet Take 1 Tab by mouth two (2) times a day. Patient taking differently: Take 50 mg by mouth two (2) times a day.  Not a current medication 3/2/20   Jose Avery NP   fluticasone propionate (FLONASE) 50 mcg/actuation nasal spray 2 sprays per nostril per day  Indications: inflammation of the nose due to an allergy  Patient taking differently: 2 sprays per nostril per day    Not a current medication  Indications: inflammation of the nose due to an allergy 4/1/19   Jose Avery NP     Patient Active Problem List   Diagnosis Code    Hypertension I10    Vitamin D deficiency E55.9    Hypokalemia E87.6    Migraine headache G43.909    CAD (coronary artery disease) I25.10    Pelvic mass R19.00    Abnormal vaginal bleeding N93.9    Hypothyroidism E03.9    Vertigo R42    Pre-diabetes R73.03    Severe obesity ([address] E66.01    Graves disease E05.00    Biliary dyskinesia K82.8     Patient Active Problem List    Diagnosis Date Noted    Biliary dyskinesia 11/03/2019    Graves disease     Severe obesity ([facility] [address] 12/27/2018    Pre-diabetes 05/20/2016    Hypothyroidism 04/15/2015    Vertigo 03/17/2014    Pelvic mass 01/07/2014    Abnormal vaginal bleeding 01/07/2014    Migraine headache 10/08/2013    CAD (coronary artery disease) 10/01/2013    Hypertension     Vitamin D deficiency     Hypokalemia      Current Outpatient Medications   Medication Sig Dispense Refill    cetirizine (ZYRTEC) 10 mg tablet Take 1 Tab by mouth daily as needed for Allergies. Take at bedtime. Indications: inflammation of the nose due to an allergy 30 Tab 1    hydroCHLOROthiazide (HYDRODIURIL) 25 mg tablet Take 1 tablet by mouth once daily 90 Tab 0    benazepriL (LOTENSIN) 40 mg tablet TAKE 1 TABLET BY MOUTH ONCE DAILY FOR HIGH BLOOD PRESSURE 90 Tab 0    amLODIPine (NORVASC) 10 mg tablet Take 1 tablet by mouth once daily 90 Tab 0    POTASSIUM PO Take  by mouth.  methocarbamoL (ROBAXIN) 500 mg tablet Take 1 Tab by mouth three (3) times daily. Indications: muscle spasm (Patient taking differently: Take 500 mg by mouth three (3) times daily. Not a current medication  Indications: muscle spasm) 30 Tab 0    triamcinolone acetonide (KENALOG) 0.1 % topical cream Apply  to affected area two (2) times a day. use thin layer 15 g 0    topiramate (TOPAMAX) 25 mg tablet TAKE 1 TABLET BY MOUTH NIGHTLY FOR  MIGRAINE  PREVENTION 30 Tab 0    Wheat Dextrin (Benefiber Clear) 3 gram/3.5 gram pwpk Take 1 Each by mouth nightly. 28 Packet 1    MULTIVITAMIN PO Take  by mouth daily.  omeprazole (PRILOSEC) 20 mg capsule Not a current medication  0    pravastatin (PRAVACHOL) 10 mg tablet TAKE 1 TABLET BY MOUTH ONCE DAILY 90 Tab 3    levothyroxine (SYNTHROID) 88 mcg tablet Take 1 Tab by mouth Daily (before breakfast). 90 Tab 4    naproxen sodium 220 mg cap Take 220 Caps by mouth daily as needed.  Not a current medication      potassium chloride SR (KLOR-CON 10) 10 mEq tablet Take 1 Tab by mouth two (2) times a day. (Patient taking differently: Take 99 mEq by mouth two (2) times a day. Takes 2 bid) 180 Tab 1    aspirin 81 mg chewable tablet Take 1 Tab by mouth daily. 30 Tab 11    cholecalciferol, vitamin D3, (VITAMIN D3) 2,000 unit Tab Take 2 Caps by mouth daily.  metFORMIN ER (GLUCOPHAGE XR) 500 mg tablet Take 1 tablet by mouth twice daily 180 Tab 0    diclofenac EC (VOLTAREN) 50 mg EC tablet Take 1 Tab by mouth two (2) times a day. (Patient taking differently: Take 50 mg by mouth two (2) times a day.  Not a current medication) 30 Tab 0    fluticasone propionate (FLONASE) 50 mcg/actuation nasal spray 2 sprays per nostril per day  Indications: inflammation of the nose due to an allergy (Patient taking differently: 2 sprays per nostril per day    Not a current medication  Indications: inflammation of the nose due to an allergy) 1 Bottle 3     Allergies   Allergen Reactions    Dilaudid [Hydromorphone (Bulk)] Unknown (comments)     Respiratory suppression leading to intubation     Past Medical History:   Diagnosis Date    CAD (coronary artery disease) 10/2013    Colon polyps     Fatty liver     GERD (gastroesophageal reflux disease)     Graves disease     hypothyroid now since procedure    H. pylori infection     Headache     sometimes migraines    Hypertension     Hypokalemia     Menopause     Nausea & vomiting     Pneumothorax ~2012    Sleep apnea     cpap compliant    Vitamin D deficiency      Past Surgical History:   Procedure Laterality Date    HX BILATERAL SALPINGO-OOPHORECTOMY      HX CHOLECYSTECTOMY  11/04/2019    Lap debo w/cholangiogram    HX COLONOSCOPY      HX ENDOSCOPY      HX HEENT      Radioactive thyroid    HX HYSTERECTOMY      PARTIAL    RI CARDIAC SURG PROCEDURE UNLIST  10-13    CARDIAC CATH     Family History   Problem Relation Age of Onset    Kidney Disease Mother  Hypertension Mother     Diabetes Mother     Heart Disease Mother     Cancer Father         Brain    Heart Disease Brother     Hypertension Brother     Diabetes Maternal Grandmother     Cancer Maternal Grandmother     Heart Disease Maternal Grandmother     Hypertension Brother     Hypertension Sister     Diabetes Sister     Hypertension Sister     Hypertension Sister     Glaucoma Brother      Social History     Tobacco Use    Smoking status: Current Every Day Smoker     Packs/day: 1.00     Years: 10.00     Pack years: 10.00     Last attempt to quit: 10/12/2013     Years since quittin.4    Smokeless tobacco: Never Used   Substance Use Topics    Alcohol use: Yes     Alcohol/week: 0.0 standard drinks     Comment: some weekends one or two beers       ROS  Pertinent items are noted on the HPI  Patient-Reported Vitals 2020   Patient-Reported Temperature 97.7        Gilma Melchor, who was evaluated through a patient-initiated, synchronous (real-time) audio only encounter, and/or her healthcare decision maker, is aware that it is a billable service, with coverage as determined by her insurance carrier. She provided verbal consent to proceed: Yes. She has not had a related appointment within my department in the past 7 days or scheduled within the next 24 hours.       Total Time: minutes: 5-10 minutes    Ezequiel Jiang NP

## 2021-04-02 NOTE — TELEPHONE ENCOUNTER
----- Message from Shannon Ruiz NP sent at 4/2/2021 12:28 PM EDT -----  Please do a back to work note for [employer] . RTW on Monday. She will be by to pick it up .   Thank you   Radha Bear

## 2021-04-17 ENCOUNTER — HOSPITAL ENCOUNTER (EMERGENCY)
Age: 51
Discharge: HOME | End: 2021-04-17
Attending: EMERGENCY MEDICINE
Payer: COMMERCIAL

## 2021-04-17 VITALS
BODY MASS INDEX: 30.73 KG/M2 | TEMPERATURE: 98.8 F | WEIGHT: 180 LBS | SYSTOLIC BLOOD PRESSURE: 131 MMHG | HEART RATE: 77 BPM | RESPIRATION RATE: 14 BRPM | HEIGHT: 64 IN | DIASTOLIC BLOOD PRESSURE: 74 MMHG | OXYGEN SATURATION: 97 %

## 2021-04-17 DIAGNOSIS — G43.809 OTHER MIGRAINE WITHOUT STATUS MIGRAINOSUS, NOT INTRACTABLE: Primary | ICD-10-CM

## 2021-04-17 LAB
ALBUMIN SERPL-MCNC: 3.8 G/DL (ref 3.5–5)
ALBUMIN/GLOB SERPL: 1.1 {RATIO} (ref 1.1–2.2)
ALP SERPL-CCNC: 85 U/L (ref 45–117)
ALT SERPL-CCNC: 39 U/L (ref 12–78)
ANION GAP SERPL CALC-SCNC: 12 MMOL/L (ref 5–15)
AST SERPL-CCNC: 27 U/L (ref 15–37)
BASOPHILS # BLD: 0.1 K/UL (ref 0–0.1)
BASOPHILS NFR BLD: 1 % (ref 0–1)
BILIRUB SERPL-MCNC: 0.2 MG/DL (ref 0.2–1)
BUN SERPL-MCNC: 19 MG/DL (ref 6–20)
BUN/CREAT SERPL: 21 (ref 12–20)
CALCIUM SERPL-MCNC: 9.4 MG/DL (ref 8.5–10.1)
CHLORIDE SERPL-SCNC: 106 MMOL/L (ref 97–108)
CO2 SERPL-SCNC: 27 MMOL/L (ref 21–32)
CREAT SERPL-MCNC: 0.92 MG/DL (ref 0.55–1.02)
DIFFERENTIAL METHOD BLD: ABNORMAL
EOSINOPHIL # BLD: 0.1 K/UL (ref 0–0.4)
EOSINOPHIL NFR BLD: 1 % (ref 0–7)
ERYTHROCYTE [DISTWIDTH] IN BLOOD BY AUTOMATED COUNT: 14.6 % (ref 11.5–14.5)
GLOBULIN SER CALC-MCNC: 3.5 G/DL (ref 2–4)
GLUCOSE SERPL-MCNC: 109 MG/DL (ref 65–100)
HCT VFR BLD AUTO: 36.6 % (ref 35–47)
HGB BLD-MCNC: 12.6 G/DL (ref 11.5–16)
IMM GRANULOCYTES # BLD AUTO: 0 K/UL (ref 0–0.04)
IMM GRANULOCYTES NFR BLD AUTO: 0 % (ref 0–0.5)
LYMPHOCYTES # BLD: 1.6 K/UL (ref 0.8–3.5)
LYMPHOCYTES NFR BLD: 16 % (ref 12–49)
MAGNESIUM SERPL-MCNC: 2.1 MG/DL (ref 1.6–2.4)
MCH RBC QN AUTO: 31.6 PG (ref 26–34)
MCHC RBC AUTO-ENTMCNC: 34.4 G/DL (ref 30–36.5)
MCV RBC AUTO: 91.7 FL (ref 80–99)
MONOCYTES # BLD: 0.6 K/UL (ref 0–1)
MONOCYTES NFR BLD: 6 % (ref 5–13)
NEUTS SEG # BLD: 7.4 K/UL (ref 1.8–8)
NEUTS SEG NFR BLD: 76 % (ref 32–75)
NRBC # BLD: 0 K/UL (ref 0–0.01)
NRBC BLD-RTO: 0 PER 100 WBC
PLATELET # BLD AUTO: 302 K/UL (ref 150–400)
PMV BLD AUTO: 9.9 FL (ref 8.9–12.9)
POTASSIUM SERPL-SCNC: 3 MMOL/L (ref 3.5–5.1)
PROT SERPL-MCNC: 7.3 G/DL (ref 6.4–8.2)
RBC # BLD AUTO: 3.99 M/UL (ref 3.8–5.2)
SODIUM SERPL-SCNC: 145 MMOL/L (ref 136–145)
WBC # BLD AUTO: 9.7 K/UL (ref 3.6–11)

## 2021-04-17 PROCEDURE — 36415 COLL VENOUS BLD VENIPUNCTURE: CPT

## 2021-04-17 PROCEDURE — 99283 EMERGENCY DEPT VISIT LOW MDM: CPT

## 2021-04-17 PROCEDURE — 83735 ASSAY OF MAGNESIUM: CPT

## 2021-04-17 PROCEDURE — 80053 COMPREHEN METABOLIC PANEL: CPT

## 2021-04-17 PROCEDURE — 85025 COMPLETE CBC W/AUTO DIFF WBC: CPT

## 2021-04-17 PROCEDURE — 96375 TX/PRO/DX INJ NEW DRUG ADDON: CPT

## 2021-04-17 PROCEDURE — 74011250637 HC RX REV CODE- 250/637: Performed by: FAMILY MEDICINE

## 2021-04-17 PROCEDURE — 96374 THER/PROPH/DIAG INJ IV PUSH: CPT

## 2021-04-17 PROCEDURE — 74011250636 HC RX REV CODE- 250/636: Performed by: EMERGENCY MEDICINE

## 2021-04-17 RX ORDER — BUTALBITAL, ACETAMINOPHEN AND CAFFEINE 300; 40; 50 MG/1; MG/1; MG/1
1 CAPSULE ORAL
Qty: 15 CAP | Refills: 0 | Status: SHIPPED | OUTPATIENT
Start: 2021-04-17 | End: 2021-10-12 | Stop reason: SDUPTHER

## 2021-04-17 RX ORDER — POTASSIUM CHLORIDE 750 MG/1
20 TABLET, FILM COATED, EXTENDED RELEASE ORAL DAILY
Qty: 14 TAB | Refills: 0 | Status: SHIPPED | OUTPATIENT
Start: 2021-04-17 | End: 2021-04-24

## 2021-04-17 RX ORDER — ONDANSETRON 4 MG/1
4 TABLET, ORALLY DISINTEGRATING ORAL
Qty: 15 TAB | Refills: 0 | Status: SHIPPED | OUTPATIENT
Start: 2021-04-17 | End: 2022-07-06

## 2021-04-17 RX ORDER — ONDANSETRON 4 MG/1
4 TABLET, ORALLY DISINTEGRATING ORAL
Qty: 10 TAB | Refills: 0 | Status: SHIPPED | OUTPATIENT
Start: 2021-04-17 | End: 2022-07-06

## 2021-04-17 RX ORDER — BUTALBITAL, ACETAMINOPHEN AND CAFFEINE 50; 325; 40 MG/1; MG/1; MG/1
1 TABLET ORAL
Status: DISCONTINUED | OUTPATIENT
Start: 2021-04-17 | End: 2021-04-17 | Stop reason: HOSPADM

## 2021-04-17 RX ORDER — DIPHENHYDRAMINE HYDROCHLORIDE 50 MG/ML
25 INJECTION, SOLUTION INTRAMUSCULAR; INTRAVENOUS
Status: COMPLETED | OUTPATIENT
Start: 2021-04-17 | End: 2021-04-17

## 2021-04-17 RX ORDER — KETOROLAC TROMETHAMINE 15 MG/ML
15 INJECTION, SOLUTION INTRAMUSCULAR; INTRAVENOUS
Status: COMPLETED | OUTPATIENT
Start: 2021-04-17 | End: 2021-04-17

## 2021-04-17 RX ORDER — POTASSIUM CHLORIDE 750 MG/1
40 TABLET, FILM COATED, EXTENDED RELEASE ORAL
Status: COMPLETED | OUTPATIENT
Start: 2021-04-17 | End: 2021-04-17

## 2021-04-17 RX ORDER — SODIUM CHLORIDE 0.9 % (FLUSH) 0.9 %
5-10 SYRINGE (ML) INJECTION ONCE
Status: DISCONTINUED | OUTPATIENT
Start: 2021-04-17 | End: 2021-04-17 | Stop reason: HOSPADM

## 2021-04-17 RX ORDER — ONDANSETRON 4 MG/1
8 TABLET, ORALLY DISINTEGRATING ORAL
Status: DISCONTINUED | OUTPATIENT
Start: 2021-04-17 | End: 2021-04-17 | Stop reason: HOSPADM

## 2021-04-17 RX ORDER — ONDANSETRON 2 MG/ML
4 INJECTION INTRAMUSCULAR; INTRAVENOUS ONCE
Status: COMPLETED | OUTPATIENT
Start: 2021-04-17 | End: 2021-04-17

## 2021-04-17 RX ADMIN — METHYLPREDNISOLONE SODIUM SUCCINATE 125 MG: 125 INJECTION, POWDER, FOR SOLUTION INTRAMUSCULAR; INTRAVENOUS at 18:46

## 2021-04-17 RX ADMIN — KETOROLAC TROMETHAMINE 15 MG: 15 INJECTION, SOLUTION INTRAMUSCULAR; INTRAVENOUS at 18:51

## 2021-04-17 RX ADMIN — ONDANSETRON 4 MG: 2 INJECTION INTRAMUSCULAR; INTRAVENOUS at 18:44

## 2021-04-17 RX ADMIN — POTASSIUM CHLORIDE 40 MEQ: 750 TABLET, EXTENDED RELEASE ORAL at 20:27

## 2021-04-17 RX ADMIN — DIPHENHYDRAMINE HYDROCHLORIDE 25 MG: 50 INJECTION, SOLUTION INTRAMUSCULAR; INTRAVENOUS at 18:49

## 2021-04-17 RX ADMIN — SODIUM CHLORIDE 1000 ML: 9 INJECTION, SOLUTION INTRAVENOUS at 18:44

## 2021-04-17 NOTE — ED PROVIDER NOTES
EMERGENCY DEPARTMENT HISTORY AND PHYSICAL EXAM      Date: 4/17/2021  Patient Name: Connie Loco    History of Presenting Illness     Chief Complaint   Patient presents with    Migraine       History Provided By: Patient    HPI:   The history is provided by the patient. Migraine   This is a recurrent problem. The current episode started more than 1 week ago. The problem occurs hourly. The problem has been rapidly worsening. The headache is aggravated by nausea. The pain is located in the left unilateral, frontal and bilateral region. The quality of the pain is described as throbbing. The pain is at a severity of 10/10. Associated symptoms include dizziness and nausea. Pertinent negatives include no fever, no near-syncope, no palpitations, no shortness of breath, no tingling, no visual change and no vomiting. She has tried NSAIDs for the symptoms. The treatment provided no relief. Connie Loco, 53 y.o. female presents to the ED with cc of cluster migraine headache. Patient reports she has been diagnosed since 2014. She reports she has had previous imaging studies and neurological evaluation. She is take Topamax but stopped this. Reports his headache started 2 weeks ago has been intermittent but more or less a daily headache. She has tried over-the-counter meds BC powders without improvement. She reports nausea but denies photophobia. She said no vomiting denies any chest pain fevers chills. Denies any neck pain neck stiffness. Denies any focal neurologic symptoms. She does report some sinus symptoms at onset but she thought that was more allergy. She denies any recent trauma or falls. Reports  imaging in last 12 months that was negative per patient. There are no other complaints, changes, or physical findings at this time. PCP: Jose Avery, PAUL    No current facility-administered medications on file prior to encounter.       Current Outpatient Medications on File Prior to Encounter Medication Sig Dispense Refill    benazepriL (LOTENSIN) 40 mg tablet TAKE 1 TABLET BY MOUTH ONCE DAILY FOR HIGH BLOOD PRESSURE 90 Tab 0    amLODIPine (NORVASC) 10 mg tablet Take 1 tablet by mouth once daily 90 Tab 0    cetirizine (ZYRTEC) 10 mg tablet Take 1 Tab by mouth daily as needed for Allergies. Take at bedtime. Indications: inflammation of the nose due to an allergy 30 Tab 1    hydroCHLOROthiazide (HYDRODIURIL) 25 mg tablet Take 1 tablet by mouth once daily 90 Tab 0    POTASSIUM PO Take  by mouth.  methocarbamoL (ROBAXIN) 500 mg tablet Take 1 Tab by mouth three (3) times daily. Indications: muscle spasm (Patient taking differently: Take 500 mg by mouth three (3) times daily. Not a current medication  Indications: muscle spasm) 30 Tab 0    triamcinolone acetonide (KENALOG) 0.1 % topical cream Apply  to affected area two (2) times a day. use thin layer 15 g 0    topiramate (TOPAMAX) 25 mg tablet TAKE 1 TABLET BY MOUTH NIGHTLY FOR  MIGRAINE  PREVENTION 30 Tab 0    Wheat Dextrin (Benefiber Clear) 3 gram/3.5 gram pwpk Take 1 Each by mouth nightly. 28 Packet 1    metFORMIN ER (GLUCOPHAGE XR) 500 mg tablet Take 1 tablet by mouth twice daily 180 Tab 0    diclofenac EC (VOLTAREN) 50 mg EC tablet Take 1 Tab by mouth two (2) times a day. (Patient taking differently: Take 50 mg by mouth two (2) times a day. Not a current medication) 30 Tab 0    MULTIVITAMIN PO Take  by mouth daily.       omeprazole (PRILOSEC) 20 mg capsule Not a current medication  0    pravastatin (PRAVACHOL) 10 mg tablet TAKE 1 TABLET BY MOUTH ONCE DAILY 90 Tab 3    fluticasone propionate (FLONASE) 50 mcg/actuation nasal spray 2 sprays per nostril per day  Indications: inflammation of the nose due to an allergy (Patient taking differently: 2 sprays per nostril per day    Not a current medication  Indications: inflammation of the nose due to an allergy) 1 Bottle 3    levothyroxine (SYNTHROID) 88 mcg tablet Take 1 Tab by mouth Daily (before breakfast). 90 Tab 4    naproxen sodium 220 mg cap Take 220 Caps by mouth daily as needed. Not a current medication      potassium chloride SR (KLOR-CON 10) 10 mEq tablet Take 1 Tab by mouth two (2) times a day. (Patient taking differently: Take 99 mEq by mouth two (2) times a day. Takes 2 bid) 180 Tab 1    aspirin 81 mg chewable tablet Take 1 Tab by mouth daily. 30 Tab 11    cholecalciferol, vitamin D3, (VITAMIN D3) 2,000 unit Tab Take 2 Caps by mouth daily.          Past History     Past Medical History:  Past Medical History:   Diagnosis Date    CAD (coronary artery disease) 10/2013    Colon polyps     Fatty liver     GERD (gastroesophageal reflux disease)     Graves disease     hypothyroid now since procedure    H. pylori infection     Headache     sometimes migraines    Hypertension     Hypokalemia     Menopause     Nausea & vomiting     Pneumothorax ~2012    Sleep apnea     cpap compliant    Vitamin D deficiency        Past Surgical History:  Past Surgical History:   Procedure Laterality Date    HX BILATERAL SALPINGO-OOPHORECTOMY      HX CHOLECYSTECTOMY  11/04/2019    Lap debo w/cholangiogram    HX COLONOSCOPY      HX ENDOSCOPY      HX HEENT      Radioactive thyroid    HX HYSTERECTOMY      PARTIAL    [place] CARDIAC SURG PROCEDURE UNLIST  10-13    CARDIAC CATH       Family History:  Family History   Problem Relation Age of Onset    Kidney Disease Mother     Hypertension Mother     Diabetes Mother     Heart Disease Mother     Cancer Father         Brain    Heart Disease Brother     Hypertension Brother     Diabetes Maternal Grandmother     Cancer Maternal Grandmother     Heart Disease Maternal Grandmother     Hypertension Brother     Hypertension Sister     Diabetes Sister     Hypertension Sister     Hypertension Sister     Glaucoma Brother        Social History:  Social History     Tobacco Use    Smoking status: Current Every Day Smoker     Packs/day: 1.00 Years: 10.00     Pack years: 10.00     Last attempt to quit: 10/12/2013     Years since quittin.5    Smokeless tobacco: Never Used   Substance Use Topics    Alcohol use: Yes     Alcohol/week: 0.0 standard drinks     Comment: some weekends one or two beers    Drug use: No       Allergies: Allergies   Allergen Reactions    Dilaudid [Hydromorphone (Bulk)] Unknown (comments)     Respiratory suppression leading to intubation         Review of Systems   Review of Systems   Constitutional: Negative. Negative for chills and fever. HENT: Negative. Negative for congestion and sore throat. Eyes: Negative. Negative for photophobia, discharge and redness. Respiratory: Negative. Negative for cough and shortness of breath. Cardiovascular: Negative. Negative for chest pain, palpitations and near-syncope. Gastrointestinal: Positive for nausea. Negative for vomiting. Genitourinary: Negative. Negative for dysuria, flank pain and frequency. Musculoskeletal: Negative. Negative for arthralgias, back pain, neck pain and neck stiffness. Skin: Negative. Negative for rash and wound. Allergic/Immunologic: Negative. Neurological: Positive for dizziness and headaches. Negative for tingling, syncope and numbness. Hematological: Negative. Negative for adenopathy. Does not bruise/bleed easily. Psychiatric/Behavioral: Negative. Negative for confusion. The patient is not nervous/anxious. All other systems reviewed and are negative. Physical Exam   Physical Exam  Vitals signs and nursing note reviewed. Constitutional:       General: She is not in acute distress. Appearance: Normal appearance. She is well-developed. She is not ill-appearing, toxic-appearing or diaphoretic. HENT:      Head: Normocephalic and atraumatic. Nose: Nose normal.      Mouth/Throat:      Mouth: Mucous membranes are moist.      Pharynx: Oropharynx is clear. Eyes:      General:         Right eye: No discharge. Left eye: No discharge. Extraocular Movements: Extraocular movements intact. Conjunctiva/sclera: Conjunctivae normal.      Pupils: Pupils are equal, round, and reactive to light. Comments: No photophobia   Neck:      Musculoskeletal: Normal range of motion and neck supple. No neck rigidity or muscular tenderness. Cardiovascular:      Rate and Rhythm: Normal rate and regular rhythm. Pulses: Normal pulses. Pulmonary:      Effort: Pulmonary effort is normal. No respiratory distress. Abdominal:      General: There is no distension. Palpations: Abdomen is soft. Musculoskeletal: Normal range of motion. General: No swelling or tenderness. Skin:     General: Skin is warm and dry. Capillary Refill: Capillary refill takes less than 2 seconds. Findings: No erythema or rash. Neurological:      General: No focal deficit present. Mental Status: She is alert and oriented to person, place, and time. Mental status is at baseline. Cranial Nerves: No cranial nerve deficit. Sensory: No sensory deficit. Comments: Nonfocal neurologic exam   Psychiatric:         Mood and Affect: Mood normal.         Behavior: Behavior normal.         Thought Content:  Thought content normal.         Judgment: Judgment normal.         Diagnostic Study Results     Labs -     Recent Results (from the past 12 hour(s))   CBC WITH AUTOMATED DIFF    Collection Time: 04/17/21  6:42 PM   Result Value Ref Range    WBC 9.7 3.6 - 11.0 K/uL    RBC 3.99 3.80 - 5.20 M/uL    HGB 12.6 11.5 - 16.0 g/dL    HCT 36.6 35.0 - 47.0 %    MCV 91.7 80.0 - 99.0 FL    MCH 31.6 26.0 - 34.0 PG    MCHC 34.4 30.0 - 36.5 g/dL    RDW 14.6 (H) 11.5 - 14.5 %    PLATELET 591 172 - 257 K/uL    MPV 9.9 8.9 - 12.9 FL    NRBC 0.0 0  WBC    ABSOLUTE NRBC 0.00 0.00 - 0.01 K/uL    NEUTROPHILS 76 (H) 32 - 75 %    LYMPHOCYTES 16 12 - 49 %    MONOCYTES 6 5 - 13 %    EOSINOPHILS 1 0 - 7 %    BASOPHILS 1 0 - 1 % IMMATURE GRANULOCYTES 0 0.0 - 0.5 %    ABS. NEUTROPHILS 7.4 1.8 - 8.0 K/UL    ABS. LYMPHOCYTES 1.6 0.8 - 3.5 K/UL    ABS. MONOCYTES 0.6 0.0 - 1.0 K/UL    ABS. EOSINOPHILS 0.1 0.0 - 0.4 K/UL    ABS. BASOPHILS 0.1 0.0 - 0.1 K/UL    ABS. IMM. GRANS. 0.0 0.00 - 0.04 K/UL    DF AUTOMATED         Radiologic Studies -   No orders to display     CT Results  (Last 48 hours)    None        CXR Results  (Last 48 hours)    None          Medical Decision Making   I am the first provider for this patient. I reviewed the vital signs, available nursing notes, past medical history, past surgical history, family history and social history. Vital Signs-Reviewed the patient's vital signs. Patient Vitals for the past 12 hrs:   Temp Pulse Resp BP SpO2   04/17/21 1813 98.8 °F (37.1 °C) 77 14 131/74 97 %                 Records Reviewed: Nursing Notes and Old Medical Records    Provider Notes (Medical Decision Making):   Patient presents complaining of a headache consistent with her usual migraines, denies any trauma recent injuries, she is concerned about dehydration and low potassium, will check basic lab work and will give her migraine cocktail and reassess. ED Course:   Initial assessment performed. The patients presenting problems have been discussed, and they are in agreement with the care plan formulated and outlined with them. I have encouraged them to ask questions as they arise throughout their visit. ED Course as of Apr 18 0802   Sat Apr 17, 2021 1954 Care assumed from Dr. Stephen Macias. Headache is improved. Patient nausea is improved. Labs are reviewed and potassium is noted to be low at 3.0. Patient will be repleted with potassium here in the ED.   Plan was discharged home with follow-up with primary care doctor and to 3 days to recheck potassium, to try Fioricet tonight and to refill her Topamax.    [PS]      ED Course User Index  [PS] Damáin Banks MD           7:00 PM  Care to Dr. Sergio Blanc, waiting laboratory studies and reevaluation after medications infused. Martin Looney MD        Procedures        Disposition:  Discharged        DISCHARGE PLAN:  1. Discharge Medication List as of 2021  8:03 PM      START taking these medications    Details   !! ondansetron (Zofran ODT) 4 mg disintegrating tablet Take 1 Tab by mouth every eight (8) hours as needed for Nausea., Normal, Disp-10 Tab, R-0      butalbital-acetaminophen-caff (FIORICET) -40 mg per capsule Take 1 Cap by mouth every four (4) hours as needed for Headache or Migraine., Normal, Disp-15 Cap, R-0      !! ondansetron (Zofran ODT) 4 mg disintegrating tablet 1 Tab by SubLINGual route every eight (8) hours as needed for Nausea or Vomiting., Normal, Disp-15 Tab, R-0      !! potassium chloride SR (Klor-Con 10) 10 mEq tablet Take 2 Tabs by mouth daily for 7 days. , Normal, Disp-14 Tab, R-0       !! - Potential duplicate medications found. Please discuss with provider. CONTINUE these medications which have NOT CHANGED    Details   benazepriL (LOTENSIN) 40 mg tablet TAKE 1 TABLET BY MOUTH ONCE DAILY FOR HIGH BLOOD PRESSURE, Normal, Disp-90 Tab, R-0      amLODIPine (NORVASC) 10 mg tablet Take 1 tablet by mouth once daily, Normal, Disp-90 Tab, R-0      cetirizine (ZYRTEC) 10 mg tablet Take 1 Tab by mouth daily as needed for Allergies. Take at bedtime. Indications: inflammation of the nose due to an allergy, Normal, Disp-30 Tab, R-1      hydroCHLOROthiazide (HYDRODIURIL) 25 mg tablet Take 1 tablet by mouth once daily, Normal, Disp-90 Tab, R-0      POTASSIUM PO Take  by mouth., Historical Med      methocarbamoL (ROBAXIN) 500 mg tablet Take 1 Tab by mouth three (3) times daily. Indications: muscle spasm, Normal, Disp-30 Tab,R-0      triamcinolone acetonide (KENALOG) 0.1 % topical cream Apply  to affected area two (2) times a day.  use thin layer, Normal, Disp-15 g,R-0      topiramate (TOPAMAX) 25 mg tablet TAKE 1 TABLET BY MOUTH NIGHTLY FOR MIGRAINE  PREVENTION, Normal, Disp-30 Tab,R-0      Wheat Dextrin (Benefiber Clear) 3 gram/3.5 gram pwpk Take 1 Each by mouth nightly., Normal, Disp-28 Packet, R-1Generic acceptable      metFORMIN ER (GLUCOPHAGE XR) 500 mg tablet Take 1 tablet by mouth twice daily, Normal, Disp-180 Tab, R-0      diclofenac EC (VOLTAREN) 50 mg EC tablet Take 1 Tab by mouth two (2) times a day., Normal, Disp-30 Tab, R-0      MULTIVITAMIN PO Take  by mouth daily. , Historical Med      omeprazole (PRILOSEC) 20 mg capsule Not a current medication, Historical Med, R-0      pravastatin (PRAVACHOL) 10 mg tablet TAKE 1 TABLET BY MOUTH ONCE DAILY, Normal, Disp-90 Tab, R-3      fluticasone propionate (FLONASE) 50 mcg/actuation nasal spray 2 sprays per nostril per day  Indications: inflammation of the nose due to an allergy, Normal, Disp-1 Bottle, R-3      levothyroxine (SYNTHROID) 88 mcg tablet Take 1 Tab by mouth Daily (before breakfast). , Normal, Disp-90 Tab, R-4      naproxen sodium 220 mg cap Take 220 Caps by mouth daily as needed. Not a current medication, Historical Med      !! potassium chloride SR (KLOR-CON 10) 10 mEq tablet Take 1 Tab by mouth two (2) times a day., Normal, Disp-180 Tab, R-1      aspirin 81 mg chewable tablet Take 1 Tab by mouth daily. Print, 81 mg, Disp-30 Tab, R-11      cholecalciferol, vitamin D3, (VITAMIN D3) 2,000 unit Tab Take 2 Caps by mouth daily. , Historical Med       !! - Potential duplicate medications found. Please discuss with provider. 2.   Follow-up Information     Follow up With Specialties Details Why Contact Info Moody Holter, NP Nurse Practitioner Schedule an appointment as soon as possible for a visit in 2 days For follow up Charanjit Lynch  [address] [phone]          3. Return to ED if worse     Diagnosis     Clinical Impression:   1. Other migraine without status migrainosus, not intractable        Attestations:     Layla Grubbs MD    Please note that this dictation was completed with [employer], the [employer] voice recognition software. Quite often unanticipated grammatical, syntax, homophones, and other interpretive errors are inadvertently transcribed by the computer software. Please disregard these errors. Please excuse any errors that have escaped final proofreading. Thank you.

## 2021-04-17 NOTE — DISCHARGE INSTRUCTIONS
Restart Topamax, potassium 40 mEq daily for 2 days then 20 mEq daily, discuss rechecking potassium with primary MD.  Return if worse or for problems as noted. Fioricet as needed for headache overnight. Zofran as needed for nausea.

## 2021-04-17 NOTE — ED TRIAGE NOTES
recurrent migraine starting 2 weeks ago worsening  3 days prior. Use to take Topamax but no longer has a RX.

## 2021-04-18 NOTE — ED NOTES
Patient states that she understands discharge instructions and follow up information. Patient will follow up with primary care if required. Patient ambulatory at time of discharge.

## 2021-04-19 ENCOUNTER — OFFICE VISIT (OUTPATIENT)
Dept: FAMILY MEDICINE CLINIC | Age: 51
End: 2021-04-19
Payer: COMMERCIAL

## 2021-04-19 ENCOUNTER — TELEPHONE (OUTPATIENT)
Dept: FAMILY MEDICINE CLINIC | Age: 51
End: 2021-04-19

## 2021-04-19 VITALS
TEMPERATURE: 97 F | WEIGHT: 211 LBS | RESPIRATION RATE: 18 BRPM | SYSTOLIC BLOOD PRESSURE: 118 MMHG | HEART RATE: 65 BPM | HEIGHT: 64 IN | DIASTOLIC BLOOD PRESSURE: 74 MMHG | OXYGEN SATURATION: 98 % | BODY MASS INDEX: 36.02 KG/M2

## 2021-04-19 DIAGNOSIS — G43.109 MIGRAINE WITH AURA AND WITHOUT STATUS MIGRAINOSUS, NOT INTRACTABLE: Primary | ICD-10-CM

## 2021-04-19 DIAGNOSIS — I10 ESSENTIAL HYPERTENSION: ICD-10-CM

## 2021-04-19 PROCEDURE — 99214 OFFICE O/P EST MOD 30 MIN: CPT | Performed by: NURSE PRACTITIONER

## 2021-04-19 RX ORDER — BENAZEPRIL HYDROCHLORIDE 40 MG/1
TABLET ORAL
Qty: 90 TAB | Refills: 0 | Status: SHIPPED | OUTPATIENT
Start: 2021-04-19 | End: 2021-05-26 | Stop reason: SDUPTHER

## 2021-04-19 RX ORDER — TOPIRAMATE 25 MG/1
25 TABLET ORAL 2 TIMES DAILY
Qty: 90 TAB | Refills: 1 | Status: SHIPPED | OUTPATIENT
Start: 2021-04-19 | End: 2021-09-28

## 2021-04-19 RX ORDER — AMLODIPINE BESYLATE 10 MG/1
TABLET ORAL
Qty: 90 TAB | Refills: 0 | Status: SHIPPED | OUTPATIENT
Start: 2021-04-19 | End: 2021-05-26 | Stop reason: SDUPTHER

## 2021-04-19 RX ORDER — PANTOPRAZOLE SODIUM 40 MG/1
TABLET, DELAYED RELEASE ORAL
COMMUNITY
Start: 2021-04-02 | End: 2022-07-06

## 2021-04-19 RX ORDER — MECLIZINE HCL 12.5 MG 12.5 MG/1
12.5 TABLET ORAL
Qty: 30 TAB | Refills: 1 | Status: SHIPPED | OUTPATIENT
Start: 2021-04-19 | End: 2021-05-09

## 2021-04-19 NOTE — PROGRESS NOTES
1. Have you been to the ER, urgent care clinic since your last visit? Hospitalized since your last visit? [facility] ER 4-    2. Have you seen or consulted any other health care providers outside of the [address] [place] [address] since your last visit? Include any pap smears or colon screening.  No      Chief Complaint   Patient presents with    Headache     f/u [facility] ER 4-         Visit Vitals  /74 (BP 1 Location: Right upper arm, BP Patient Position: Sitting, BP Cuff Size: Large adult)   Pulse 65   Temp 97 °F (36.1 °C) (Temporal)   Resp 18   Ht 5' 4\" (1.626 m)   Wt 211 lb (95.7 kg)   SpO2 98%   BMI 36.22 kg/m²       Pain Scale: 5/10  Pain Location: Head

## 2021-04-20 NOTE — PROGRESS NOTES
Subjective:     Lin Padilla is a 53 y.o. female who presents today with the following:  Chief Complaint   Patient presents with    Headache     f/u [address] ER 4-       Patient Active Problem List   Diagnosis Code    Hypertension I10    Vitamin D deficiency E55.9    Hypokalemia E87.6    Migraine headache G43.909    CAD (coronary artery disease) I25.10    Pelvic mass R19.00    Abnormal vaginal bleeding N93.9    Hypothyroidism E03.9    Vertigo R42    Pre-diabetes R73.03    Severe obesity ([address] E66.01    Graves disease E05.00    Biliary dyskinesia K82.8         COMPLIANT WITH MEDICATION:   HTN; Denies chest pain, dyspnea, palpitations, headache and blurred vision. Blood pressure normotensive. Migraine; x 2 weeks with aura auditory . Excerpt from Dr. Bernard Waite This is a recurrent problem. The current episode started more than 1 week ago. The problem occurs hourly. The problem has been rapidly worsening. The headache is aggravated by nausea. The pain is located in the left unilateral, frontal and bilateral region. The quality of the pain is described as throbbing. The pain is at a severity of 10/10. Associated symptoms include dizziness and nausea. Pertinent negatives include no fever, no near-syncope, no palpitations, no shortness of breath, no tingling, no visual change and no vomiting. She has tried NSAIDs for the symptoms. The treatment provided no relief.    Potassium level was low in the hospital. Asked to increase potassium to 20 meq  for two days and then go back to 10  per day. depression screening addressed not at risk    abuse screening addressed denies    learning assessment addressed reviewed nurses notes    fall risk addressed not at risk    HM: addressed declined Hep C screening today.   She is considering smoking cessation and will contact her employer for smoking cessation program.     ROS:  Gen: denies fever, chills, fatigue, weight loss, weight gain  HEENT:denies blurry vision, nasal congestion, sore throat  Resp: denies dypsnea, cough, wheezing  CV: denies chest pain radiating to the jaws or arms, palpitations, lower extremity edema  Abd: denies nausea, vomiting, diarrhea, constipation  Neuro: denies numbness/tingling  Endo: denies polyuria, polydipsia, heat/cold intolerance  Heme: no lymphadenopathy    Allergies   Allergen Reactions    Dilaudid [Hydromorphone (Bulk)] Unknown (comments)     Respiratory suppression leading to intubation         Current Outpatient Medications:     cholecalciferol, vitamin D3, (VITAMIN D3 PO), Take  by mouth. 125 mcg 5000 IU 1 A DAY, Disp: , Rfl:     pantoprazole (PROTONIX) 40 mg tablet, PRN, Disp: , Rfl:     topiramate (TOPAMAX) 25 mg tablet, Take 1 Tab by mouth two (2) times a day., Disp: 90 Tab, Rfl: 1    meclizine (ANTIVERT) 12.5 mg tablet, Take 1 Tab by mouth three (3) times daily as needed for Dizziness for up to 20 days. Indications: sensation of spinning or whirling, Disp: 30 Tab, Rfl: 1    amLODIPine (NORVASC) 10 mg tablet, Take 1 tablet by mouth once daily, Disp: 90 Tab, Rfl: 0    benazepriL (LOTENSIN) 40 mg tablet, TAKE 1 TABLET BY MOUTH ONCE DAILY FOR HIGH BLOOD PRESSURE, Disp: 90 Tab, Rfl: 0    ondansetron (Zofran ODT) 4 mg disintegrating tablet, Take 1 Tab by mouth every eight (8) hours as needed for Nausea., Disp: 10 Tab, Rfl: 0    butalbital-acetaminophen-caff (FIORICET) -40 mg per capsule, Take 1 Cap by mouth every four (4) hours as needed for Headache or Migraine. , Disp: 15 Cap, Rfl: 0    ondansetron (Zofran ODT) 4 mg disintegrating tablet, 1 Tab by SubLINGual route every eight (8) hours as needed for Nausea or Vomiting., Disp: 15 Tab, Rfl: 0    potassium chloride SR (Klor-Con 10) 10 mEq tablet, Take 2 Tabs by mouth daily for 7 days. , Disp: 14 Tab, Rfl: 0    hydroCHLOROthiazide (HYDRODIURIL) 25 mg tablet, Take 1 tablet by mouth once daily, Disp: 90 Tab, Rfl: 0    metFORMIN ER (GLUCOPHAGE XR) 500 mg tablet, Take 1 tablet by mouth twice daily (Patient taking differently: 1 a day), Disp: 180 Tab, Rfl: 0    MULTIVITAMIN PO, Take  by mouth daily. , Disp: , Rfl:     pravastatin (PRAVACHOL) 10 mg tablet, TAKE 1 TABLET BY MOUTH ONCE DAILY, Disp: 90 Tab, Rfl: 3    fluticasone propionate (FLONASE) 50 mcg/actuation nasal spray, 2 sprays per nostril per day  Indications: inflammation of the nose due to an allergy (Patient taking differently: 2 sprays per nostril per day PRN  Indications: inflammation of the nose due to an allergy), Disp: 1 Bottle, Rfl: 3    levothyroxine (SYNTHROID) 88 mcg tablet, Take 1 Tab by mouth Daily (before breakfast). , Disp: 90 Tab, Rfl: 4    naproxen sodium 220 mg cap, Take 220 Caps by mouth daily as needed. 2 a day, Disp: , Rfl:     potassium chloride SR (KLOR-CON 10) 10 mEq tablet, Take 1 Tab by mouth two (2) times a day. (Patient taking differently: Take 99 mEq by mouth two (2) times a day. Takes 2 bid, not started as of 4-), Disp: 180 Tab, Rfl: 1    aspirin 81 mg chewable tablet, Take 1 Tab by mouth daily. , Disp: 30 Tab, Rfl: 11    cetirizine (ZYRTEC) 10 mg tablet, Take 1 Tab by mouth daily as needed for Allergies. Take at bedtime. Indications: inflammation of the nose due to an allergy (Patient taking differently: Take 10 mg by mouth daily as needed for Allergies. Take at bedtime. not a current medication  Indications: inflammation of the nose due to an allergy), Disp: 30 Tab, Rfl: 1    methocarbamoL (ROBAXIN) 500 mg tablet, Take 1 Tab by mouth three (3) times daily. Indications: muscle spasm (Patient taking differently: Take 500 mg by mouth three (3) times daily. Not a current medication  Indications: muscle spasm), Disp: 30 Tab, Rfl: 0    triamcinolone acetonide (KENALOG) 0.1 % topical cream, Apply  to affected area two (2) times a day. use thin layer, Disp: 15 g, Rfl: 0    Wheat Dextrin (Benefiber Clear) 3 gram/3.5 gram pwpk, Take 1 Each by mouth nightly.  (Patient taking differently: Take 1 Each by mouth nightly. Not a current medication), Disp: 28 Packet, Rfl: 1    diclofenac EC (VOLTAREN) 50 mg EC tablet, Take 1 Tab by mouth two (2) times a day. (Patient taking differently: Take 50 mg by mouth two (2) times a day. Not a current medication), Disp: 30 Tab, Rfl: 0    omeprazole (PRILOSEC) 20 mg capsule, Not a current medication, Disp: , Rfl: 0    Past Medical History:   Diagnosis Date    CAD (coronary artery disease) 10/2013    Colon polyps     Fatty liver     GERD (gastroesophageal reflux disease)     Graves disease     hypothyroid now since procedure    H. pylori infection     Headache     sometimes migraines    Hypertension     Hypokalemia     Menopause     Nausea & vomiting     Pneumothorax ~    Sleep apnea     cpap compliant    Vitamin D deficiency        Past Surgical History:   Procedure Laterality Date    HX BILATERAL SALPINGO-OOPHORECTOMY      HX CHOLECYSTECTOMY  2019    Lap debo w/cholangiogram    HX COLONOSCOPY      HX ENDOSCOPY      HX HEENT      Radioactive thyroid    HX HYSTERECTOMY      PARTIAL    GA CARDIAC SURG PROCEDURE UNLIST  10-13    CARDIAC CATH       Social History     Tobacco Use   Smoking Status Current Every Day Smoker    Packs/day: 1.00    Years: 10.00    Pack years: 10.00    Last attempt to quit: 10/12/2013    Years since quittin.5   Smokeless Tobacco Never Used       Social History     Socioeconomic History    Marital status: SINGLE     Spouse name: Not on file    Number of children: Not on file    Years of education: Not on file    Highest education level: Not on file   Tobacco Use    Smoking status: Current Every Day Smoker     Packs/day: 1.00     Years: 10.00     Pack years: 10.00     Last attempt to quit: 10/12/2013     Years since quittin.5    Smokeless tobacco: Never Used   Substance and Sexual Activity    Alcohol use:  Yes     Alcohol/week: 0.0 standard drinks     Comment: some weekends one or two beers    Drug use: No       Family History   Problem Relation Age of Onset    Kidney Disease Mother     Hypertension Mother     Diabetes Mother     Heart Disease Mother     Cancer Father         Brain    Heart Disease Brother     Hypertension Brother     Diabetes Maternal Grandmother     Cancer Maternal Grandmother     Heart Disease Maternal Grandmother     Hypertension Brother     Hypertension Sister     Diabetes Sister     Hypertension Sister     Hypertension Sister     Glaucoma Brother          Objective:     Visit Vitals  /74 (BP 1 Location: Right upper arm, BP Patient Position: Sitting, BP Cuff Size: Large adult)   Pulse 65   Temp 97 °F (36.1 °C) (Temporal)   Resp 18   Ht 5' 4\" (1.626 m)   Wt 211 lb (95.7 kg)   SpO2 98%   BMI 36.22 kg/m²     Body mass index is 36.22 kg/m². General: Alert and oriented. No acute distress. Well nourished  HEENT :  Ears:TMs are normal. Canals are clear. Eyes: pupils equal, round, react to light and accommodation. Extra ocular movements intact. Nose: patent. Mouth and throat is clear. Neck:supple full range of motion no thyromegaly. Trachea midline, No carotid bruits. No significant lymphadenopathy  Lungs[de-identified] clear to auscultation without wheezes, rales, or rhonchi. Heart :RRR, S1 & S2 are normal intensity. No murmur; no gallop  Abdomen: bowel sounds active. No tenderness, guarding, rebound, masses, hepatic or spleen enlargement  Back: no CVA tenderness. Extremities: without clubbing, cyanosis, or edema  Pulses: radial and femoral pulses are normal  Neuro: HMF intact. Cranial nerves II through XII grossly normal.  Motor: is 5 over 5 and symmetrical.   Deep tendon reflexes: +2 equal  Psych:appropriate behavior, mood, affect and judgement.    Results for orders placed or performed during the hospital encounter of 04/17/21   CBC WITH AUTOMATED DIFF   Result Value Ref Range    WBC 9.7 3.6 - 11.0 K/uL    RBC 3.99 3.80 - 5.20 M/uL    HGB 12.6 11.5 - 16.0 g/dL    HCT 36.6 35.0 - 47.0 %    MCV 91.7 80.0 - 99.0 FL    MCH 31.6 26.0 - 34.0 PG    MCHC 34.4 30.0 - 36.5 g/dL    RDW 14.6 (H) 11.5 - 14.5 %    PLATELET 051 263 - 699 K/uL    MPV 9.9 8.9 - 12.9 FL    NRBC 0.0 0  WBC    ABSOLUTE NRBC 0.00 0.00 - 0.01 K/uL    NEUTROPHILS 76 (H) 32 - 75 %    LYMPHOCYTES 16 12 - 49 %    MONOCYTES 6 5 - 13 %    EOSINOPHILS 1 0 - 7 %    BASOPHILS 1 0 - 1 %    IMMATURE GRANULOCYTES 0 0.0 - 0.5 %    ABS. NEUTROPHILS 7.4 1.8 - 8.0 K/UL    ABS. LYMPHOCYTES 1.6 0.8 - 3.5 K/UL    ABS. MONOCYTES 0.6 0.0 - 1.0 K/UL    ABS. EOSINOPHILS 0.1 0.0 - 0.4 K/UL    ABS. BASOPHILS 0.1 0.0 - 0.1 K/UL    ABS. IMM. GRANS. 0.0 0.00 - 0.04 K/UL    DF AUTOMATED     METABOLIC PANEL, COMPREHENSIVE   Result Value Ref Range    Sodium 145 136 - 145 mmol/L    Potassium 3.0 (L) 3.5 - 5.1 mmol/L    Chloride 106 97 - 108 mmol/L    CO2 27 21 - 32 mmol/L    Anion gap 12 5 - 15 mmol/L    Glucose 109 (H) 65 - 100 mg/dL    BUN 19 6 - 20 MG/DL    Creatinine 0.92 0.55 - 1.02 MG/DL    BUN/Creatinine ratio 21 (H) 12 - 20      GFR est AA >60 >60 ml/min/1.73m2    GFR est non-AA >60 >60 ml/min/1.73m2    Calcium 9.4 8.5 - 10.1 MG/DL    Bilirubin, total 0.2 0.2 - 1.0 MG/DL    ALT (SGPT) 39 12 - 78 U/L    AST (SGOT) 27 15 - 37 U/L    Alk. phosphatase 85 45 - 117 U/L    Protein, total 7.3 6.4 - 8.2 g/dL    Albumin 3.8 3.5 - 5.0 g/dL    Globulin 3.5 2.0 - 4.0 g/dL    A-G Ratio 1.1 1.1 - 2.2     MAGNESIUM   Result Value Ref Range    Magnesium 2.1 1.6 - 2.4 mg/dL       No results found for this visit on 04/19/21. Assessment/ Plan:     1. Migraine with aura and without status migrainosus, not intractable  Migraine episode resolved for today. Restart Topamax preventative       2. Essential hypertension  BP in goal no change to medical management   Medications renewed         Orders Placed This Encounter    cholecalciferol, vitamin D3, (VITAMIN D3 PO)     Sig: Take  by mouth.  125 mcg 5000 IU 1 A DAY    pantoprazole (PROTONIX) 40 mg tablet     Sig: PRN    topiramate (TOPAMAX) 25 mg tablet     Sig: Take 1 Tab by mouth two (2) times a day. Dispense:  90 Tab     Refill:  1    meclizine (ANTIVERT) 12.5 mg tablet     Sig: Take 1 Tab by mouth three (3) times daily as needed for Dizziness for up to 20 days. Indications: sensation of spinning or whirling     Dispense:  30 Tab     Refill:  1    amLODIPine (NORVASC) 10 mg tablet     Sig: Take 1 tablet by mouth once daily     Dispense:  90 Tab     Refill:  0    benazepriL (LOTENSIN) 40 mg tablet     Sig: TAKE 1 TABLET BY MOUTH ONCE DAILY FOR HIGH BLOOD PRESSURE     Dispense:  90 Tab     Refill:  0         Verbal and written instructions (see AVS) provided. Patient expresses understanding of diagnosis and treatment plan.     Health Maintenance Due   Topic Date Due    Hepatitis C Screening  Never done               KATHRYN [email or web address]

## 2021-05-26 ENCOUNTER — OFFICE VISIT (OUTPATIENT)
Dept: FAMILY MEDICINE CLINIC | Age: 51
End: 2021-05-26
Payer: COMMERCIAL

## 2021-05-26 VITALS
OXYGEN SATURATION: 98 % | WEIGHT: 214 LBS | HEART RATE: 85 BPM | RESPIRATION RATE: 16 BRPM | TEMPERATURE: 97.2 F | DIASTOLIC BLOOD PRESSURE: 70 MMHG | SYSTOLIC BLOOD PRESSURE: 120 MMHG | HEIGHT: 64 IN | BODY MASS INDEX: 36.54 KG/M2

## 2021-05-26 DIAGNOSIS — Z71.6 ENCOUNTER FOR SMOKING CESSATION COUNSELING: ICD-10-CM

## 2021-05-26 DIAGNOSIS — I10 ESSENTIAL HYPERTENSION: ICD-10-CM

## 2021-05-26 DIAGNOSIS — Z11.59 ENCOUNTER FOR HEPATITIS C SCREENING TEST FOR LOW RISK PATIENT: Primary | ICD-10-CM

## 2021-05-26 DIAGNOSIS — E87.6 HYPOKALEMIA: ICD-10-CM

## 2021-05-26 PROCEDURE — 99213 OFFICE O/P EST LOW 20 MIN: CPT | Performed by: NURSE PRACTITIONER

## 2021-05-26 RX ORDER — AMLODIPINE BESYLATE 10 MG/1
TABLET ORAL
Qty: 90 TABLET | Refills: 1 | Status: SHIPPED | OUTPATIENT
Start: 2021-05-26 | End: 2021-06-28

## 2021-05-26 RX ORDER — IBUPROFEN 200 MG
1 TABLET ORAL EVERY 24 HOURS
Qty: 30 PATCH | Refills: 0 | Status: SHIPPED | OUTPATIENT
Start: 2021-05-26 | End: 2021-06-16 | Stop reason: DRUGHIGH

## 2021-05-26 RX ORDER — POTASSIUM CHLORIDE 20 MEQ/1
40 TABLET, EXTENDED RELEASE ORAL 2 TIMES DAILY
Qty: 90 TABLET | Refills: 1 | Status: SHIPPED | OUTPATIENT
Start: 2021-05-26 | End: 2021-06-16 | Stop reason: SDUPTHER

## 2021-05-26 RX ORDER — BENAZEPRIL HYDROCHLORIDE 40 MG/1
TABLET ORAL
Qty: 90 TABLET | Refills: 1 | Status: SHIPPED | OUTPATIENT
Start: 2021-05-26 | End: 2021-09-28

## 2021-05-26 RX ORDER — HYDROCHLOROTHIAZIDE 25 MG/1
25 TABLET ORAL DAILY
Qty: 90 TABLET | Refills: 1 | Status: SHIPPED | OUTPATIENT
Start: 2021-05-26 | End: 2021-06-16

## 2021-05-26 NOTE — PROGRESS NOTES
1. Have you been to the ER, urgent care clinic since your last visit? Hospitalized since your last visit? No    2. Have you seen or consulted any other health care providers outside of the [address] [place] [address] since your last visit? Include any pap smears or colon screening.  No      Chief Complaint   Patient presents with    Headache    Nicotine Dependence     needs RX for patch         Visit Vitals  /70 (BP 1 Location: Left upper arm, BP Patient Position: Sitting, BP Cuff Size: Large adult)   Pulse 85   Temp 97.2 °F (36.2 °C) (Temporal)   Resp 16   Ht 5' 4\" (1.626 m)   Wt 214 lb (97.1 kg)   SpO2 98%   BMI 36.73 kg/m²       Pain Scale: 0 - No pain/10  Pain Location:

## 2021-05-27 LAB
HCV AB SERPL QL IA: NONREACTIVE
HCV COMMENT,HCGAC: NORMAL
POTASSIUM SERPL-SCNC: 3 MMOL/L (ref 3.5–5.1)

## 2021-05-28 NOTE — PROGRESS NOTES
Please call  MS. Underwood to let her know potassium is still a little low at 3.0. Continue potassium supplement.

## 2021-05-31 NOTE — ACP (ADVANCE CARE PLANNING)
Has advanced medical directive scanned into chart. Reviewed at this visit. No changes.  Roxana FIORE

## 2021-05-31 NOTE — PROGRESS NOTES
Subjective:     Dea Ware is a 53 y.o. female who presents today with the following:  Chief Complaint   Patient presents with    Headache    Nicotine Dependence     needs RX for patch   Ms Manuel Bolton works for [employer] . Ready to stop smoking . Requests rx for nicoderm patch. Patient Active Problem List   Diagnosis Code    Hypertension I10    Vitamin D deficiency E55.9    Hypokalemia E87.6    Migraine headache G43.909    CAD (coronary artery disease) I25.10    Pelvic mass R19.00    Abnormal vaginal bleeding N93.9    Hypothyroidism E03.9    Vertigo R42    Pre-diabetes R73.03    Severe obesity (HCC) E66.01    Graves disease E05.00    Biliary dyskinesia K82.8         COMPLIANT WITH MEDICATION:   HTN; Denies chest pain, dyspnea, palpitations, headache and blurred vision. Blood pressure normotensive. Smoking cessation; Insurance coverage makes it affordable and she is ready to start the Nicoderm patch. depression screening addressed not at risk    abuse screening addressed denies    learning assessment addressed reviewed nurses notes    fall risk addressed not at risk    HM: addressed    ROS:  Gen: denies fever, chills, fatigue, weight loss, weight gain  HEENT:denies blurry vision, nasal congestion, sore throat  Resp: denies dypsnea, cough, wheezing  CV: denies chest pain radiating to the jaws or arms, palpitations, lower extremity edema  Abd: denies nausea, vomiting, diarrhea, constipation  Neuro: denies numbness/tingling  Endo: denies polyuria, polydipsia, heat/cold intolerance  Heme: no lymphadenopathy    Allergies   Allergen Reactions    Dilaudid [Hydromorphone (Bulk)] Unknown (comments)     Respiratory suppression leading to intubation         Current Outpatient Medications:     potassium chloride (K-DUR, KLOR-CON) 20 mEq tablet, Take 2 Tablets by mouth two (2) times a day.  Indications: low amount of potassium in the blood, Disp: 90 Tablet, Rfl: 1    hydroCHLOROthiazide (HYDRODIURIL) 25 mg tablet, Take 1 Tablet by mouth daily. Indications: high blood pressure, Disp: 90 Tablet, Rfl: 1    benazepriL (LOTENSIN) 40 mg tablet, TAKE 1 TABLET BY MOUTH ONCE DAILY FOR HIGH BLOOD PRESSURE, Disp: 90 Tablet, Rfl: 1    amLODIPine (NORVASC) 10 mg tablet, Take 1 tablet by mouth once daily, Disp: 90 Tablet, Rfl: 1    nicotine (NICODERM CQ) 21 mg/24 hr, 1 Patch by TransDERmal route every twenty-four (24) hours for 30 days. Indications: stop smoking, Disp: 30 Patch, Rfl: 0    cholecalciferol, vitamin D3, (VITAMIN D3 PO), Take  by mouth. 125 mcg 5000 IU 1 A DAY, Disp: , Rfl:     pantoprazole (PROTONIX) 40 mg tablet, PRN, Disp: , Rfl:     topiramate (TOPAMAX) 25 mg tablet, Take 1 Tab by mouth two (2) times a day., Disp: 90 Tab, Rfl: 1    ondansetron (Zofran ODT) 4 mg disintegrating tablet, Take 1 Tab by mouth every eight (8) hours as needed for Nausea., Disp: 10 Tab, Rfl: 0    butalbital-acetaminophen-caff (FIORICET) -40 mg per capsule, Take 1 Cap by mouth every four (4) hours as needed for Headache or Migraine. , Disp: 15 Cap, Rfl: 0    ondansetron (Zofran ODT) 4 mg disintegrating tablet, 1 Tab by SubLINGual route every eight (8) hours as needed for Nausea or Vomiting., Disp: 15 Tab, Rfl: 0    triamcinolone acetonide (KENALOG) 0.1 % topical cream, Apply  to affected area two (2) times a day. use thin layer, Disp: 15 g, Rfl: 0    Wheat Dextrin (Benefiber Clear) 3 gram/3.5 gram pwpk, Take 1 Each by mouth nightly. (Patient taking differently: Take 1 Each by mouth nightly. Not a current medication), Disp: 28 Packet, Rfl: 1    metFORMIN ER (GLUCOPHAGE XR) 500 mg tablet, Take 1 tablet by mouth twice daily (Patient taking differently: 1 a day), Disp: 180 Tab, Rfl: 0    MULTIVITAMIN PO, Take  by mouth daily. , Disp: , Rfl:     omeprazole (PRILOSEC) 20 mg capsule, Not a current medication, Disp: , Rfl: 0    pravastatin (PRAVACHOL) 10 mg tablet, TAKE 1 TABLET BY MOUTH ONCE DAILY, Disp: 90 Tab, Rfl: 3   fluticasone propionate (FLONASE) 50 mcg/actuation nasal spray, 2 sprays per nostril per day  Indications: inflammation of the nose due to an allergy (Patient taking differently: 2 sprays per nostril per day PRN  Indications: inflammation of the nose due to an allergy), Disp: 1 Bottle, Rfl: 3    levothyroxine (SYNTHROID) 88 mcg tablet, Take 1 Tab by mouth Daily (before breakfast). , Disp: 90 Tab, Rfl: 4    naproxen sodium 220 mg cap, Take 220 Caps by mouth daily as needed. 2 a day, Disp: , Rfl:     aspirin 81 mg chewable tablet, Take 1 Tab by mouth daily. , Disp: 30 Tab, Rfl: 11    cetirizine (ZYRTEC) 10 mg tablet, Take 1 Tab by mouth daily as needed for Allergies. Take at bedtime. Indications: inflammation of the nose due to an allergy (Patient not taking: Reported on 5/26/2021), Disp: 30 Tab, Rfl: 1    methocarbamoL (ROBAXIN) 500 mg tablet, Take 1 Tab by mouth three (3) times daily. Indications: muscle spasm (Patient not taking: Reported on 5/26/2021), Disp: 30 Tab, Rfl: 0    diclofenac EC (VOLTAREN) 50 mg EC tablet, Take 1 Tab by mouth two (2) times a day.  (Patient not taking: Reported on 5/26/2021), Disp: 30 Tab, Rfl: 0    Past Medical History:   Diagnosis Date    CAD (coronary artery disease) 10/2013    Colon polyps     Fatty liver     GERD (gastroesophageal reflux disease)     Graves disease     hypothyroid now since procedure    H. pylori infection     Headache     sometimes migraines    Hypertension     Hypokalemia     Menopause     Nausea & vomiting     Pneumothorax ~2012    Sleep apnea     cpap compliant    Vitamin D deficiency        Past Surgical History:   Procedure Laterality Date    HX BILATERAL SALPINGO-OOPHORECTOMY      HX CHOLECYSTECTOMY  11/04/2019    Lap debo w/cholangiogram    HX COLONOSCOPY      HX ENDOSCOPY      HX HEENT      Radioactive thyroid    HX HYSTERECTOMY      PARTIAL    [place] CARDIAC SURG PROCEDURE UNLIST  10-13    CARDIAC CATH       Social History     Tobacco Use   Smoking Status Current Every Day Smoker    Packs/day: 1.00    Years: 10.00    Pack years: 10.00    Last attempt to quit: 10/12/2013    Years since quittin.6   Smokeless Tobacco Never Used       Social History     Socioeconomic History    Marital status: SINGLE     Spouse name: Not on file    Number of children: Not on file    Years of education: Not on file    Highest education level: Not on file   Tobacco Use    Smoking status: Current Every Day Smoker     Packs/day: 1.00     Years: 10.00     Pack years: 10.00     Last attempt to quit: 10/12/2013     Years since quittin.6    Smokeless tobacco: Never Used   Vaping Use    Vaping Use: Never used   Substance and Sexual Activity    Alcohol use: Yes     Alcohol/week: 0.0 standard drinks     Comment: some weekends one or two beers    Drug use: No     Social Determinants of Health     Financial Resource Strain:     Difficulty of Paying Living Expenses:    Food Insecurity:     Worried About Running Out of Food in the Last Year:     [address] in the Last Year:    Transportation Needs:     Lack of Transportation (Medical):      Lack of Transportation (Non-Medical):    Physical Activity:     Days of Exercise per Week:     Minutes of Exercise per Session:    Stress:     Feeling of Stress :    Social Connections:     Frequency of Communication with Friends and Family:     Frequency of Social Gatherings with Friends and Family:     Attends [employer] Services:     Active Member of Clubs or Organizations:     Attends Club or Organization Meetings:     Marital Status:        Family History   Problem Relation Age of Onset    Kidney Disease Mother     Hypertension Mother     Diabetes Mother     Heart Disease Mother     Cancer Father         Brain    Heart Disease Brother     Hypertension Brother     Diabetes Maternal Grandmother     Cancer Maternal Grandmother     Heart Disease Maternal Grandmother     Hypertension Brother    Erazo Hypertension Sister     Diabetes Sister     Hypertension Sister     Hypertension Sister     Glaucoma Brother          Objective:     Visit Vitals  /70 (BP 1 Location: Left upper arm, BP Patient Position: Sitting, BP Cuff Size: Large adult)   Pulse 85   Temp 97.2 °F (36.2 °C) (Temporal)   Resp 16   Ht 5' 4\" (1.626 m)   Wt 214 lb (97.1 kg)   SpO2 98%   BMI 36.73 kg/m²     Body mass index is 36.73 kg/m². General: Alert and oriented. No acute distress. Well nourished  HEENT :  Ears:TMs are normal. Canals are clear. Eyes: pupils equal, round, react to light and accommodation. Extra ocular movements intact. Nose: patent. Mouth and throat is clear. Neck:supple full range of motion no thyromegaly. Trachea midline, No carotid bruits. No significant lymphadenopathy  Lungs[de-identified] clear to auscultation without wheezes, rales, or rhonchi. Heart :RRR, S1 & S2 are normal intensity. No murmur; no gallop  Abdomen: bowel sounds active. No tenderness, guarding, rebound, masses, hepatic or spleen enlargement  Back: no CVA tenderness. Extremities: without clubbing, cyanosis, or edema  Pulses: radial and femoral pulses are normal  Neuro: HMF intact. Cranial nerves II through XII grossly normal.  Motor: is 5 over 5 and symmetrical.   Deep tendon reflexes: +2 equal  Psych:appropriate behavior, mood, affect and judgement. Results for orders placed or performed in visit on 05/26/21   HEPATITIS C AB   Result Value Ref Range    Hep C virus Ab Interp. NONREACTIVE NONREACTIVE      Hep C  virus Ab comment Method used is Siemens Advia Centaur     POTASSIUM   Result Value Ref Range    Potassium 3.0 (L) 3.5 - 5.1 mmol/L       Results for orders placed or performed in visit on 05/26/21   HEPATITIS C AB   Result Value Ref Range    Hep C virus Ab Interp.  NONREACTIVE NONREACTIVE      Hep C  virus Ab comment Method used is Siemens Advia Centaur     POTASSIUM   Result Value Ref Range    Potassium 3.0 (L) 3.5 - 5.1 mmol/L       Assessment/ Plan:     1. Hypokalemia  Continue on Potassium 10 meq   - POTASSIUM; Future  - COLLECTION VENOUS BLOOD,VENIPUNCTURE  - POTASSIUM  - COLLECTION VENOUS BLOOD,VENIPUNCTURE    2. Essential hypertension  BP in goal continue amlodipine 10 mg po and benzapril   Reorder - hydroCHLOROthiazide (HYDRODIURIL) 25 mg tablet; Take 1 Tablet by mouth daily. Indications: high blood pressure  Dispense: 90 Tablet; Refill: 1  - POTASSIUM; Future  - COLLECTION VENOUS BLOOD,VENIPUNCTURE  - POTASSIUM  - COLLECTION VENOUS BLOOD,VENIPUNCTURE    3. Encounter for hepatitis C screening test for low risk patient    - HEPATITIS C AB; Future  - HEPATITIS C AB  - COLLECTION VENOUS BLOOD,VENIPUNCTURE    4. Encounter for smoking cessation counseling  Approved coverage from insurance   Start Nicoderm patch   Follow up in office . Orders Placed This Encounter    COLLECTION VENOUS BLOOD,VENIPUNCTURE    POTASSIUM     Standing Status:   Future     Number of Occurrences:   1     Standing Expiration Date:   2021    HEPATITIS C AB     Standing Status:   Future     Number of Occurrences:   1     Standing Expiration Date:   2021    COLLECTION VENOUS BLOOD,VENIPUNCTURE    potassium chloride (K-DUR, KLOR-CON) 20 mEq tablet     Sig: Take 2 Tablets by mouth two (2) times a day. Indications: low amount of potassium in the blood     Dispense:  90 Tablet     Refill:  1    hydroCHLOROthiazide (HYDRODIURIL) 25 mg tablet     Sig: Take 1 Tablet by mouth daily. Indications: high blood pressure     Dispense:  90 Tablet     Refill:  1    benazepriL (LOTENSIN) 40 mg tablet     Sig: TAKE 1 TABLET BY MOUTH ONCE DAILY FOR HIGH BLOOD PRESSURE     Dispense:  90 Tablet     Refill:  1    amLODIPine (NORVASC) 10 mg tablet     Sig: Take 1 tablet by mouth once daily     Dispense:  90 Tablet     Refill:  1    nicotine (NICODERM CQ) 21 mg/24 hr     Si Patch by TransDERmal route every twenty-four (24) hours for 30 days.  Indications: stop smoking Dispense:  30 Patch     Refill:  0         Verbal and written instructions (see AVS) provided. Patient expresses understanding of diagnosis and treatment plan. There are no preventive care reminders to display for this patient.             Boston Lim, FNP-C

## 2021-06-01 NOTE — PROGRESS NOTES
Spoke with patient, confirmed with 2 identifiers, advised patient of lab results and recommendations. Pt expressed understanding.  KT

## 2021-06-16 ENCOUNTER — OFFICE VISIT (OUTPATIENT)
Dept: FAMILY MEDICINE CLINIC | Age: 51
End: 2021-06-16
Payer: COMMERCIAL

## 2021-06-16 VITALS
HEIGHT: 66 IN | WEIGHT: 213 LBS | BODY MASS INDEX: 34.23 KG/M2 | RESPIRATION RATE: 18 BRPM | DIASTOLIC BLOOD PRESSURE: 70 MMHG | TEMPERATURE: 97.2 F | HEART RATE: 72 BPM | OXYGEN SATURATION: 98 % | SYSTOLIC BLOOD PRESSURE: 118 MMHG

## 2021-06-16 DIAGNOSIS — Z71.6 ENCOUNTER FOR SMOKING CESSATION COUNSELING: ICD-10-CM

## 2021-06-16 DIAGNOSIS — I10 ESSENTIAL HYPERTENSION: Primary | ICD-10-CM

## 2021-06-16 PROCEDURE — 99213 OFFICE O/P EST LOW 20 MIN: CPT | Performed by: NURSE PRACTITIONER

## 2021-06-16 RX ORDER — SPIRONOLACTONE 25 MG/1
25 TABLET ORAL DAILY
Qty: 90 TABLET | Refills: 1 | Status: SHIPPED | OUTPATIENT
Start: 2021-06-16 | End: 2021-10-12 | Stop reason: SDUPTHER

## 2021-06-16 RX ORDER — IBUPROFEN 200 MG
1 TABLET ORAL EVERY 24 HOURS
Qty: 30 PATCH | Refills: 0 | Status: SHIPPED | OUTPATIENT
Start: 2021-06-16 | End: 2022-01-24

## 2021-06-16 RX ORDER — POTASSIUM CHLORIDE 20 MEQ/1
20 TABLET, EXTENDED RELEASE ORAL DAILY
Qty: 90 TABLET | Refills: 1 | Status: SHIPPED | OUTPATIENT
Start: 2021-06-16 | End: 2021-10-12 | Stop reason: SDUPTHER

## 2021-06-20 NOTE — PROGRESS NOTES
Subjective:     Olga Wills is a 53 y.o. female who presents today with the following:  Chief Complaint   Patient presents with    Hypertension     f/u     Nicotine Dependence       Patient Active Problem List   Diagnosis Code    Hypertension I10    Vitamin D deficiency E55.9    Hypokalemia E87.6    Migraine headache G43.909    CAD (coronary artery disease) I25.10    Pelvic mass R19.00    Abnormal vaginal bleeding N93.9    Hypothyroidism E03.9    Vertigo R42    Pre-diabetes R73.03    Severe obesity (HCC) E66.01    Graves disease E05.00    Biliary dyskinesia K82.8         COMPLIANT WITH MEDICATION:   HTN; Denies chest pain, dyspnea, palpitations, headache and blurred vision. Blood pressure normotensive. Smoking cessation; doing very well  /success ful. Ready to go from Nicoderm 21 mg to 14 mg.     depression screening addressed not at risk    abuse screening addressed denies    learning assessment addressed reviewed nurses notes    fall risk addressed not at risk    HM: addressed    ROS:  Gen: denies fever, chills, fatigue, weight loss, weight gain  HEENT:denies blurry vision, nasal congestion, sore throat  Resp: denies dypsnea, cough, wheezing  CV: denies chest pain radiating to the jaws or arms, palpitations, lower extremity edema  Abd: denies nausea, vomiting, diarrhea, constipation  Neuro: denies numbness/tingling  Endo: denies polyuria, polydipsia, heat/cold intolerance  Heme: no lymphadenopathy    Allergies   Allergen Reactions    Dilaudid [Hydromorphone (Bulk)] Unknown (comments)     Respiratory suppression leading to intubation         Current Outpatient Medications:     spironolactone (ALDACTONE) 25 mg tablet, Take 1 Tablet by mouth daily. Indications: hypokalemia prevention, Disp: 90 Tablet, Rfl: 1    potassium chloride (K-DUR, KLOR-CON) 20 mEq tablet, Take 1 Tablet by mouth daily.  Indications: low amount of potassium in the blood, Disp: 90 Tablet, Rfl: 1    nicotine (NICODERM CQ) 14 mg/24 hr patch, 1 Patch by TransDERmal route every twenty-four (24) hours for 30 days. , Disp: 30 Patch, Rfl: 0    benazepriL (LOTENSIN) 40 mg tablet, TAKE 1 TABLET BY MOUTH ONCE DAILY FOR HIGH BLOOD PRESSURE, Disp: 90 Tablet, Rfl: 1    amLODIPine (NORVASC) 10 mg tablet, Take 1 tablet by mouth once daily, Disp: 90 Tablet, Rfl: 1    pantoprazole (PROTONIX) 40 mg tablet, PRN, Disp: , Rfl:     topiramate (TOPAMAX) 25 mg tablet, Take 1 Tab by mouth two (2) times a day., Disp: 90 Tab, Rfl: 1    ondansetron (Zofran ODT) 4 mg disintegrating tablet, Take 1 Tab by mouth every eight (8) hours as needed for Nausea., Disp: 10 Tab, Rfl: 0    butalbital-acetaminophen-caff (FIORICET) -40 mg per capsule, Take 1 Cap by mouth every four (4) hours as needed for Headache or Migraine. , Disp: 15 Cap, Rfl: 0    ondansetron (Zofran ODT) 4 mg disintegrating tablet, 1 Tab by SubLINGual route every eight (8) hours as needed for Nausea or Vomiting., Disp: 15 Tab, Rfl: 0    triamcinolone acetonide (KENALOG) 0.1 % topical cream, Apply  to affected area two (2) times a day. use thin layer, Disp: 15 g, Rfl: 0    metFORMIN ER (GLUCOPHAGE XR) 500 mg tablet, Take 1 tablet by mouth twice daily (Patient taking differently: 1 a day), Disp: 180 Tab, Rfl: 0    MULTIVITAMIN PO, Take  by mouth daily. , Disp: , Rfl:     pravastatin (PRAVACHOL) 10 mg tablet, TAKE 1 TABLET BY MOUTH ONCE DAILY, Disp: 90 Tab, Rfl: 3    fluticasone propionate (FLONASE) 50 mcg/actuation nasal spray, 2 sprays per nostril per day  Indications: inflammation of the nose due to an allergy (Patient taking differently: 2 sprays per nostril per day PRN  Indications: inflammation of the nose due to an allergy), Disp: 1 Bottle, Rfl: 3    levothyroxine (SYNTHROID) 88 mcg tablet, Take 1 Tab by mouth Daily (before breakfast). , Disp: 90 Tab, Rfl: 4    naproxen sodium 220 mg cap, Take 220 Caps by mouth daily as needed.  2 a day, Disp: , Rfl:     aspirin 81 mg chewable tablet, Take 1 Tab by mouth daily. , Disp: 30 Tab, Rfl: 11    cholecalciferol, vitamin D3, (VITAMIN D3 PO), Take  by mouth. 125 mcg 5000 IU 1 A DAY (Patient not taking: Reported on 6/16/2021), Disp: , Rfl:     cetirizine (ZYRTEC) 10 mg tablet, Take 1 Tab by mouth daily as needed for Allergies. Take at bedtime. Indications: inflammation of the nose due to an allergy (Patient not taking: Reported on 5/26/2021), Disp: 30 Tab, Rfl: 1    methocarbamoL (ROBAXIN) 500 mg tablet, Take 1 Tab by mouth three (3) times daily. Indications: muscle spasm (Patient not taking: Reported on 5/26/2021), Disp: 30 Tab, Rfl: 0    Wheat Dextrin (Benefiber Clear) 3 gram/3.5 gram pwpk, Take 1 Each by mouth nightly. (Patient not taking: Reported on 6/16/2021), Disp: 28 Packet, Rfl: 1    diclofenac EC (VOLTAREN) 50 mg EC tablet, Take 1 Tab by mouth two (2) times a day.  (Patient not taking: Reported on 5/26/2021), Disp: 30 Tab, Rfl: 0    omeprazole (PRILOSEC) 20 mg capsule, Not a current medication (Patient not taking: Reported on 6/16/2021), Disp: , Rfl: 0    Past Medical History:   Diagnosis Date    CAD (coronary artery disease) 10/2013    Colon polyps     Fatty liver     GERD (gastroesophageal reflux disease)     Graves disease     hypothyroid now since procedure    H. pylori infection     Headache     sometimes migraines    Hypertension     Hypokalemia     Menopause     Nausea & vomiting     Pneumothorax ~2012    Sleep apnea     cpap compliant    Vitamin D deficiency        Past Surgical History:   Procedure Laterality Date    HX BILATERAL SALPINGO-OOPHORECTOMY      HX CHOLECYSTECTOMY  11/04/2019    Lap debo w/cholangiogram    HX COLONOSCOPY      HX ENDOSCOPY      HX HEENT      Radioactive thyroid    HX HYSTERECTOMY      PARTIAL    [place] CARDIAC SURG PROCEDURE UNLIST  10-13    CARDIAC CATH       Social History     Tobacco Use   Smoking Status Current Every Day Smoker    Packs/day: 1.00    Years: 10.00    Pack years: 10.00  Last attempt to quit: 10/12/2013    Years since quittin.6   Smokeless Tobacco Never Used       Social History     Socioeconomic History    Marital status: SINGLE     Spouse name: Not on file    Number of children: Not on file    Years of education: Not on file    Highest education level: Not on file   Tobacco Use    Smoking status: Current Every Day Smoker     Packs/day: 1.00     Years: 10.00     Pack years: 10.00     Last attempt to quit: 10/12/2013     Years since quittin.6    Smokeless tobacco: Never Used   Vaping Use    Vaping Use: Never used   Substance and Sexual Activity    Alcohol use: Yes     Alcohol/week: 0.0 standard drinks     Comment: some weekends one or two beers    Drug use: No     Social Determinants of Health     Financial Resource Strain:     Difficulty of Paying Living Expenses:    Food Insecurity:     Worried About Running Out of Food in the Last Year:     [address] in the Last Year:    Transportation Needs:     Lack of Transportation (Medical):      Lack of Transportation (Non-Medical):    Physical Activity:     Days of Exercise per Week:     Minutes of Exercise per Session:    Stress:     Feeling of Stress :    Social Connections:     Frequency of Communication with Friends and Family:     Frequency of Social Gatherings with Friends and Family:     Attends [employer] Services:     Active Member of Clubs or Organizations:     Attends Club or Organization Meetings:     Marital Status:        Family History   Problem Relation Age of Onset    Kidney Disease Mother     Hypertension Mother     Diabetes Mother     Heart Disease Mother     Cancer Father         Brain    Heart Disease Brother     Hypertension Brother     Diabetes Maternal Grandmother     Cancer Maternal Grandmother     Heart Disease Maternal Grandmother     Hypertension Brother     Hypertension Sister     Diabetes Sister     Hypertension Sister     Hypertension Sister     Glaucoma Brother          Objective:     Visit Vitals  /70 (BP 1 Location: Right upper arm, BP Patient Position: Sitting, BP Cuff Size: Large adult)   Pulse 72   Temp 97.2 °F (36.2 °C) (Temporal)   Resp 18   Ht 5' 6\" (1.676 m)   Wt 213 lb (96.6 kg)   SpO2 98%   BMI 34.38 kg/m²     Body mass index is 34.38 kg/m². General: Alert and oriented. No acute distress. Well nourished  HEENT :  Ears:TMs are normal. Canals are clear. Eyes: pupils equal, round, react to light and accommodation. Extra ocular movements intact. Nose: patent. Mouth and throat is clear. Neck:supple full range of motion no thyromegaly. Trachea midline, No carotid bruits. No significant lymphadenopathy  Lungs[de-identified] clear to auscultation without wheezes, rales, or rhonchi. Heart :RRR, S1 & S2 are normal intensity. No murmur; no gallop  Abdomen: bowel sounds active. No tenderness, guarding, rebound, masses, hepatic or spleen enlargement  Back: no CVA tenderness. Extremities: without clubbing, cyanosis, or edema  Pulses: radial and femoral pulses are normal  Neuro: HMF intact. Cranial nerves II through XII grossly normal.  Motor: is 5 over 5 and symmetrical.   Deep tendon reflexes: +2 equal  Psych:appropriate behavior, mood, affect and judgement. Results for orders placed or performed in visit on 05/26/21   HEPATITIS C AB   Result Value Ref Range    Hep C virus Ab Interp. NONREACTIVE NONREACTIVE      Hep C  virus Ab comment Method used is Siemens Advia Centaur     POTASSIUM   Result Value Ref Range    Potassium 3.0 (L) 3.5 - 5.1 mmol/L       No results found for this visit on 06/16/21. Assessment/ Plan:     1. Essential hypertension  BP in goal continue Lotensin , Norvasc and HCTZ as presribed    2. Encounter for smoking cessation counseling  Decrease nicoderm from 21 mg to 14 mg. Doing well . Orders Placed This Encounter    spironolactone (ALDACTONE) 25 mg tablet     Sig: Take 1 Tablet by mouth daily.  Indications: hypokalemia prevention Dispense:  90 Tablet     Refill:  1     HX of low potassium with clinical s/s    potassium chloride (K-DUR, KLOR-CON) 20 mEq tablet     Sig: Take 1 Tablet by mouth daily. Indications: low amount of potassium in the blood     Dispense:  90 Tablet     Refill:  1    nicotine (NICODERM CQ) 14 mg/24 hr patch     Si Patch by TransDERmal route every twenty-four (24) hours for 30 days. Dispense:  30 Patch     Refill:  0         Verbal and written instructions (see AVS) provided. Patient expresses understanding of diagnosis and treatment plan. There are no preventive care reminders to display for this patient.             REMY NevarezC

## 2021-08-31 ENCOUNTER — TELEPHONE (OUTPATIENT)
Dept: FAMILY MEDICINE CLINIC | Age: 51
End: 2021-08-31

## 2021-08-31 NOTE — TELEPHONE ENCOUNTER
----- Message from Sharmin Love sent at 8/31/2021  1:01 PM EDT -----  Regarding: PAUL Fregoso/ telephone  General Message/Vendor Calls    Caller's first and last name: Pt      Reason for call: Pt would like a call back about a bug bite that's itching a lot, and blistering up.        Callback required yes/no and why: yes      Best contact number(s):[phone]        Details to clarify the request: n/a    Sharmin Love

## 2021-08-31 NOTE — TELEPHONE ENCOUNTER
JHOAN MEJIA [facility] , she was advised of Juliette's advice. She stated OK thanks she was already using some hydrocortisone cream, but then the area developed a blister which burst.  The area is in the back crest of her leg, so she is not sure if it was a tick bite or not, she works outside a lot all the time. I stated to her, to keep an eye on the area, look for signs of infection, redness, warm to the touch and pus drainage, she can give the office a call back or do urgent care. She stated again OK  thanks and she will.

## 2021-08-31 NOTE — TELEPHONE ENCOUNTER
Please advise patient to take a benadryl . Cool compresses to the insect bite if localized reaction If systemic reaction  call 911 /go to urgent care or the ER if she is having difficulty breathing, tongue throat swelling .

## 2021-09-28 RX ORDER — BENAZEPRIL HYDROCHLORIDE 40 MG/1
TABLET ORAL
Qty: 90 TABLET | Refills: 0 | Status: SHIPPED | OUTPATIENT
Start: 2021-09-28 | End: 2021-10-12 | Stop reason: SDUPTHER

## 2021-09-28 RX ORDER — TOPIRAMATE 25 MG/1
TABLET ORAL
Qty: 90 TABLET | Refills: 0 | Status: SHIPPED | OUTPATIENT
Start: 2021-09-28 | End: 2021-10-12 | Stop reason: SDUPTHER

## 2021-09-28 RX ORDER — AMLODIPINE BESYLATE 10 MG/1
TABLET ORAL
Qty: 90 TABLET | Refills: 0 | Status: SHIPPED | OUTPATIENT
Start: 2021-09-28 | End: 2021-10-12 | Stop reason: SDUPTHER

## 2021-10-12 ENCOUNTER — OFFICE VISIT (OUTPATIENT)
Dept: FAMILY MEDICINE CLINIC | Age: 51
End: 2021-10-12
Payer: COMMERCIAL

## 2021-10-12 VITALS
DIASTOLIC BLOOD PRESSURE: 68 MMHG | OXYGEN SATURATION: 98 % | SYSTOLIC BLOOD PRESSURE: 120 MMHG | RESPIRATION RATE: 18 BRPM | BODY MASS INDEX: 34.1 KG/M2 | WEIGHT: 212.2 LBS | HEIGHT: 66 IN | HEART RATE: 85 BPM | TEMPERATURE: 98.7 F

## 2021-10-12 DIAGNOSIS — G43.909 MIGRAINE WITHOUT STATUS MIGRAINOSUS, NOT INTRACTABLE, UNSPECIFIED MIGRAINE TYPE: ICD-10-CM

## 2021-10-12 DIAGNOSIS — E78.5 HYPERLIPIDEMIA, UNSPECIFIED HYPERLIPIDEMIA TYPE: ICD-10-CM

## 2021-10-12 DIAGNOSIS — A04.8 H. PYLORI INFECTION: ICD-10-CM

## 2021-10-12 DIAGNOSIS — K82.8 BILIARY DYSKINESIA: Primary | ICD-10-CM

## 2021-10-12 LAB
COMMENT, HOLDF: NORMAL
SAMPLES BEING HELD,HOLD: NORMAL

## 2021-10-12 PROCEDURE — 99214 OFFICE O/P EST MOD 30 MIN: CPT | Performed by: NURSE PRACTITIONER

## 2021-10-12 RX ORDER — AMLODIPINE BESYLATE 10 MG/1
10 TABLET ORAL DAILY
Qty: 90 TABLET | Refills: 0 | Status: SHIPPED | OUTPATIENT
Start: 2021-10-12 | End: 2022-09-22

## 2021-10-12 RX ORDER — BUTALBITAL, ACETAMINOPHEN AND CAFFEINE 50; 325; 40 MG/1; MG/1; MG/1
1 TABLET ORAL
Qty: 30 TABLET | Refills: 1 | Status: SHIPPED | OUTPATIENT
Start: 2021-10-12 | End: 2021-10-12 | Stop reason: SDUPTHER

## 2021-10-12 RX ORDER — POTASSIUM CHLORIDE 20 MEQ/1
20 TABLET, EXTENDED RELEASE ORAL DAILY
Qty: 90 TABLET | Refills: 1 | Status: SHIPPED | OUTPATIENT
Start: 2021-10-12 | End: 2022-07-06

## 2021-10-12 RX ORDER — BUTALBITAL, ACETAMINOPHEN AND CAFFEINE 300; 40; 50 MG/1; MG/1; MG/1
1 CAPSULE ORAL
Qty: 15 CAPSULE | Refills: 0 | Status: SHIPPED | OUTPATIENT
Start: 2021-10-12 | End: 2021-10-12 | Stop reason: SDUPTHER

## 2021-10-12 RX ORDER — BUTALBITAL, ACETAMINOPHEN AND CAFFEINE 50; 325; 40 MG/1; MG/1; MG/1
1 TABLET ORAL
Qty: 30 TABLET | Refills: 1 | Status: SHIPPED | OUTPATIENT
Start: 2021-10-12 | End: 2022-04-14

## 2021-10-12 RX ORDER — SPIRONOLACTONE 25 MG/1
25 TABLET ORAL DAILY
Qty: 90 TABLET | Refills: 1 | Status: SHIPPED | OUTPATIENT
Start: 2021-10-12 | End: 2022-08-10

## 2021-10-12 RX ORDER — PRAVASTATIN SODIUM 10 MG/1
10 TABLET ORAL DAILY
Qty: 90 TABLET | Refills: 1 | Status: SHIPPED | OUTPATIENT
Start: 2021-10-12 | End: 2022-05-09

## 2021-10-12 RX ORDER — TOPIRAMATE 25 MG/1
25 TABLET ORAL 2 TIMES DAILY
Qty: 90 TABLET | Refills: 1 | Status: SHIPPED | OUTPATIENT
Start: 2021-10-12 | End: 2021-12-27

## 2021-10-12 RX ORDER — BENAZEPRIL HYDROCHLORIDE 40 MG/1
40 TABLET ORAL DAILY
Qty: 90 TABLET | Refills: 0 | Status: SHIPPED | OUTPATIENT
Start: 2021-10-12 | End: 2022-06-25

## 2021-10-14 LAB
H PYLORI IGA SER-ACNC: <9 UNITS (ref 0–8.9)
H PYLORI IGG SER IA-ACNC: 0.42 INDEX VALUE (ref 0–0.79)

## 2021-10-14 NOTE — PROGRESS NOTES
Patient verified by stating name and date of birth.  Patient informed of lab results and states understanding per Joseph Bare

## 2021-10-15 NOTE — ACP (ADVANCE CARE PLANNING)
Has advanced medical directive scanned into chart. Reviewed at this visit. No changes.  [facility]

## 2021-10-15 NOTE — PROGRESS NOTES
Subjective:     Monika Garcia is a 49 y.o. female who presents today with the following:  Chief Complaint   Patient presents with    Epigastric Pain       Patient Active Problem List   Diagnosis Code    Hypertension I10    Vitamin D deficiency E55.9    Hypokalemia E87.6    Migraine headache G43.909    CAD (coronary artery disease) I25.10    Pelvic mass R19.00    Abnormal vaginal bleeding N93.9    Hypothyroidism E03.9    Vertigo R42    Pre-diabetes R73.03    Severe obesity (HCC) E66.01    Graves disease E05.00    Biliary dyskinesia K82.8         COMPLIANT WITH MEDICATION:   HTN; Denies chest pain, dyspnea, palpitations, headache and blurred vision. Blood pressure normotensive. History of H pylor treated started having upper quadrant abdominal pain last week. intermittent . followed by GI . Se plans to follow soon      depression screening addressed not at risk    abuse screening addressed denies    learning assessment addressed reviewed nurses notes    fall risk addressed not at risk    HM: addressed discussed lab tests and immunization at next appointment . ROS:  Gen: denies fever, chills, fatigue, weight loss, weight gain  HEENT:denies blurry vision, nasal congestion, sore throat  Resp: denies dypsnea, cough, wheezing  CV: denies chest pain radiating to the jaws or arms, palpitations, lower extremity edema  Abd: denies nausea, vomiting, diarrhea, constipation  Neuro: denies numbness/tingling  Endo: denies polyuria, polydipsia, heat/cold intolerance  Heme: no lymphadenopathy    Allergies   Allergen Reactions    Dilaudid [Hydromorphone (Bulk)] Unknown (comments)     Respiratory suppression leading to intubation         Current Outpatient Medications:     topiramate (TOPAMAX) 25 mg tablet, Take 1 Tablet by mouth two (2) times a day., Disp: 90 Tablet, Rfl: 1    benazepriL (LOTENSIN) 40 mg tablet, Take 1 Tablet by mouth daily. , Disp: 90 Tablet, Rfl: 0    spironolactone (ALDACTONE) 25 mg tablet, Take 1 Tablet by mouth daily. Indications: hypokalemia prevention, Disp: 90 Tablet, Rfl: 1    pravastatin (PRAVACHOL) 10 mg tablet, Take 1 Tablet by mouth daily. , Disp: 90 Tablet, Rfl: 1    amLODIPine (NORVASC) 10 mg tablet, Take 1 Tablet by mouth daily. , Disp: 90 Tablet, Rfl: 0    potassium chloride (K-DUR, KLOR-CON) 20 mEq tablet, Take 1 Tablet by mouth daily. Indications: low amount of potassium in the blood, Disp: 90 Tablet, Rfl: 1    butalbital-acetaminophen-caffeine (FIORICET, ESGIC) -40 mg per tablet, Take 1 Tablet by mouth every six (6) hours as needed for Headache. Indications: a migraine headache, Disp: 30 Tablet, Rfl: 1    cetirizine (ZYRTEC) 10 mg tablet, TAKE 1 TABLET BY MOUTH AT BEDTIME AS NEEDED FOR  ALLERGIES, Disp: 30 Tablet, Rfl: 0    cholecalciferol, vitamin D3, (VITAMIN D3 PO), Take  by mouth. 125 mcg 5000 IU 1 A DAY , Disp: , Rfl:     pantoprazole (PROTONIX) 40 mg tablet, PRN, Disp: , Rfl:     ondansetron (Zofran ODT) 4 mg disintegrating tablet, Take 1 Tab by mouth every eight (8) hours as needed for Nausea., Disp: 10 Tab, Rfl: 0    ondansetron (Zofran ODT) 4 mg disintegrating tablet, 1 Tab by SubLINGual route every eight (8) hours as needed for Nausea or Vomiting., Disp: 15 Tab, Rfl: 0    methocarbamoL (ROBAXIN) 500 mg tablet, Take 1 Tab by mouth three (3) times daily. Indications: muscle spasm, Disp: 30 Tab, Rfl: 0    triamcinolone acetonide (KENALOG) 0.1 % topical cream, Apply  to affected area two (2) times a day. use thin layer, Disp: 15 g, Rfl: 0    Wheat Dextrin (Benefiber Clear) 3 gram/3.5 gram pwpk, Take 1 Each by mouth nightly., Disp: 28 Packet, Rfl: 1    metFORMIN ER (GLUCOPHAGE XR) 500 mg tablet, Take 1 tablet by mouth twice daily (Patient taking differently: 1 a day), Disp: 180 Tab, Rfl: 0    diclofenac EC (VOLTAREN) 50 mg EC tablet, Take 1 Tab by mouth two (2) times a day., Disp: 30 Tab, Rfl: 0    MULTIVITAMIN PO, Take  by mouth daily. , Disp: , Rfl:    omeprazole (PRILOSEC) 20 mg capsule, Not a current medication, Disp: , Rfl: 0    fluticasone propionate (FLONASE) 50 mcg/actuation nasal spray, 2 sprays per nostril per day  Indications: inflammation of the nose due to an allergy (Patient taking differently: 2 sprays per nostril per day PRN  Indications: inflammation of the nose due to an allergy), Disp: 1 Bottle, Rfl: 3    levothyroxine (SYNTHROID) 88 mcg tablet, Take 1 Tab by mouth Daily (before breakfast). , Disp: 90 Tab, Rfl: 4    naproxen sodium 220 mg cap, Take 220 Caps by mouth daily as needed. 2 a day, Disp: , Rfl:     aspirin 81 mg chewable tablet, Take 1 Tab by mouth daily. , Disp: 30 Tab, Rfl: 11    Past Medical History:   Diagnosis Date    CAD (coronary artery disease) 10/2013    Colon polyps     Fatty liver     GERD (gastroesophageal reflux disease)     Graves disease     hypothyroid now since procedure    H. pylori infection     Headache     sometimes migraines    Hypertension     Hypokalemia     Menopause     Nausea & vomiting     Pneumothorax ~    Sleep apnea     cpap compliant    Vitamin D deficiency        Past Surgical History:   Procedure Laterality Date    HX BILATERAL SALPINGO-OOPHORECTOMY      HX CHOLECYSTECTOMY  2019    Lap debo w/cholangiogram    HX COLONOSCOPY      HX ENDOSCOPY      HX HEENT      Radioactive thyroid    HX HYSTERECTOMY      PARTIAL    MI CARDIAC SURG PROCEDURE UNLIST  10-13    CARDIAC CATH       Social History     Tobacco Use   Smoking Status Current Every Day Smoker    Packs/day: 1.00    Years: 10.00    Pack years: 10.00    Last attempt to quit: 10/12/2013    Years since quittin.0   Smokeless Tobacco Never Used       Social History     Socioeconomic History    Marital status: SINGLE     Spouse name: Not on file    Number of children: Not on file    Years of education: Not on file    Highest education level: Not on file   Tobacco Use    Smoking status: Current Every Day Smoker Packs/day: 1.00     Years: 10.00     Pack years: 10.00     Last attempt to quit: 10/12/2013     Years since quittin.0    Smokeless tobacco: Never Used   Vaping Use    Vaping Use: Never used   Substance and Sexual Activity    Alcohol use: Yes     Alcohol/week: 0.0 standard drinks     Comment: some weekends one or two beers    Drug use: No     Social Determinants of Health     Financial Resource Strain:     Difficulty of Paying Living Expenses:    Food Insecurity:     Worried About Running Out of Food in the Last Year:     [address] in the Last Year:    Transportation Needs:     Lack of Transportation (Medical):  Lack of Transportation (Non-Medical):    Physical Activity:     Days of Exercise per Week:     Minutes of Exercise per Session:    Stress:     Feeling of Stress :    Social Connections:     Frequency of Communication with Friends and Family:     Frequency of Social Gatherings with Friends and Family:     Attends Islam Services:     Active Member of Clubs or Organizations:     Attends Club or Organization Meetings:     Marital Status:        Family History   Problem Relation Age of Onset    Kidney Disease Mother     Hypertension Mother     Diabetes Mother     Heart Disease Mother     Cancer Father         Brain    Heart Disease Brother     Hypertension Brother     Diabetes Maternal Grandmother     Cancer Maternal Grandmother     Heart Disease Maternal Grandmother     Hypertension Brother     Hypertension Sister     Diabetes Sister     Hypertension Sister     Hypertension Sister     Glaucoma Brother          Objective:     Visit Vitals  /68 (BP 1 Location: Right arm, BP Patient Position: Sitting, BP Cuff Size: Adult)   Pulse 85   Temp 98.7 °F (37.1 °C) (Temporal)   Resp 18   Ht 5' 6\" (1.676 m)   Wt 212 lb 3.2 oz (96.3 kg)   SpO2 98%   BMI 34.25 kg/m²     Body mass index is 34.25 kg/m². General: Alert and oriented. No acute distress.   Well nourished  HEENT :  Ears:TMs are normal. Canals are clear. Eyes: pupils equal, round, react to light and accommodation. Extra ocular movements intact. Nose: patent. Mouth and throat is clear. Neck:supple full range of motion no thyromegaly. Trachea midline, No carotid bruits. No significant lymphadenopathy  Lungs[de-identified] clear to auscultation without wheezes, rales, or rhonchi. Heart :RRR, S1 & S2 are normal intensity. No murmur; no gallop  Abdomen: bowel sounds active. No tenderness, guarding, rebound, masses, hepatic or spleen enlargement  Back: no CVA tenderness. Extremities: without clubbing, cyanosis, or edema  Pulses: radial and femoral pulses are normal  Neuro: HMF intact. Cranial nerves II through XII grossly normal.  Motor: is 5 over 5 and symmetrical.   Deep tendon reflexes: +2 equal  Psych:appropriate behavior, mood, affect and judgement. Results for orders placed or performed in visit on 10/12/21   H PYLORI AB, IGG, QT   Result Value Ref Range    H. pylori Ab, IgG 0.42 0.00 - 0.79 Index Value   H PYLORI AB, IGA   Result Value Ref Range    H. pylori Ab, IgA <9.0 0.0 - 8.9 units   SAMPLES BEING HELD   Result Value Ref Range    SAMPLES BEING HELD 2SST     COMMENT        Add-on orders for these samples will be processed based on acceptable specimen integrity and analyte stability, which may vary by analyte. Results for orders placed or performed in visit on 10/12/21   H PYLORI AB, IGG, QT   Result Value Ref Range    H. pylori Ab, IgG 0.42 0.00 - 0.79 Index Value   H PYLORI AB, IGA   Result Value Ref Range    H. pylori Ab, IgA <9.0 0.0 - 8.9 units   SAMPLES BEING HELD   Result Value Ref Range    SAMPLES BEING HELD 2SST     COMMENT        Add-on orders for these samples will be processed based on acceptable specimen integrity and analyte stability, which may vary by analyte. Assessment/ Plan:     1.  Biliary dyskinesia  Follow up with specilalist   - H PYLORI AB, IGG, QT; Future  - H PYLORI AB, IGA; Future  - H PYLORI AG, STOOL; Future  - H PYLORI AB, IGG, QT  - H PYLORI AB, IGA    2. H. pylori infection  Follow up with specialist   - H PYLORI AB, IGG, QT; Future  - H PYLORI AB, IGA; Future  - H PYLORI AG, STOOL; Future  - H PYLORI AB, IGG, QT  - H PYLORI AB, IGA    3. Migraine without status migrainosus, not intractable, unspecified migraine type  Continue Topamax preventative . Keep a migraine log Treat seasonal allergies and avoid triggers when possible. 4. Hyperlipidemia, unspecified hyperlipidemia type  Encourage a heart healthy diet. - pravastatin (PRAVACHOL) 10 mg tablet; Take 1 Tablet by mouth daily. Dispense: 90 Tablet; Refill: 1      Orders Placed This Encounter    H PYLORI AB, IGG, QT     Standing Status:   Future     Number of Occurrences:   1     Standing Expiration Date:   11/12/2021    H PYLORI AB, IGA     Standing Status:   Future     Number of Occurrences:   1     Standing Expiration Date:   10/12/2022    H PYLORI AG, STOOL     Standing Status:   Future     Standing Expiration Date:   10/12/2022     Order Specific Question:   Specimen source     Answer:   Stool [235]    SAMPLES BEING HELD     ATLASSITEID:[id number]    DISCONTD: butalbital-acetaminophen-caffeine (FIORICET, ESGIC) -40 mg per tablet     Sig: Take 1 Tablet by mouth every six (6) hours as needed for Headache. Indications: a migraine headache     Dispense:  30 Tablet     Refill:  1    DISCONTD: butalbital-acetaminophen-caff (FIORICET) -40 mg per capsule     Sig: Take 1 Capsule by mouth every four (4) hours as needed for Headache or Migraine. Dispense:  15 Capsule     Refill:  0    topiramate (TOPAMAX) 25 mg tablet     Sig: Take 1 Tablet by mouth two (2) times a day. Dispense:  90 Tablet     Refill:  1    benazepriL (LOTENSIN) 40 mg tablet     Sig: Take 1 Tablet by mouth daily. Dispense:  90 Tablet     Refill:  0    spironolactone (ALDACTONE) 25 mg tablet     Sig: Take 1 Tablet by mouth daily. Indications: hypokalemia prevention     Dispense:  90 Tablet     Refill:  1     HX of low potassium with clinical s/s    pravastatin (PRAVACHOL) 10 mg tablet     Sig: Take 1 Tablet by mouth daily. Dispense:  90 Tablet     Refill:  1    amLODIPine (NORVASC) 10 mg tablet     Sig: Take 1 Tablet by mouth daily. Dispense:  90 Tablet     Refill:  0    potassium chloride (K-DUR, KLOR-CON) 20 mEq tablet     Sig: Take 1 Tablet by mouth daily. Indications: low amount of potassium in the blood     Dispense:  90 Tablet     Refill:  1    butalbital-acetaminophen-caffeine (FIORICET, ESGIC) -40 mg per tablet     Sig: Take 1 Tablet by mouth every six (6) hours as needed for Headache. Indications: a migraine headache     Dispense:  30 Tablet     Refill:  1         Verbal and written instructions (see AVS) provided. Patient expresses understanding of diagnosis and treatment plan. Health Maintenance Due   Topic Date Due    Flu Vaccine (1) Never done    Shingrix Vaccine Age 50> (1 of 2) Never done    A1C test (Diabetic or Prediabetic)  10/21/2021    Lipid Screen  10/21/2021         Follow-up and Dispositions    · Return in about 1 month (around 11/12/2021).            SPENSER Cabrera

## 2021-11-02 ENCOUNTER — OFFICE VISIT (OUTPATIENT)
Dept: FAMILY MEDICINE CLINIC | Age: 51
End: 2021-11-02
Payer: COMMERCIAL

## 2021-11-02 VITALS
HEART RATE: 66 BPM | TEMPERATURE: 98.1 F | SYSTOLIC BLOOD PRESSURE: 112 MMHG | DIASTOLIC BLOOD PRESSURE: 74 MMHG | RESPIRATION RATE: 18 BRPM | BODY MASS INDEX: 33.85 KG/M2 | OXYGEN SATURATION: 100 % | HEIGHT: 66 IN | WEIGHT: 210.6 LBS

## 2021-11-02 DIAGNOSIS — E78.5 HYPERLIPIDEMIA, UNSPECIFIED HYPERLIPIDEMIA TYPE: ICD-10-CM

## 2021-11-02 DIAGNOSIS — I10 ESSENTIAL HYPERTENSION: ICD-10-CM

## 2021-11-02 DIAGNOSIS — Z00.00 WELL ADULT EXAM: Primary | ICD-10-CM

## 2021-11-02 DIAGNOSIS — R73.03 PREDIABETES: ICD-10-CM

## 2021-11-02 PROCEDURE — 99396 PREV VISIT EST AGE 40-64: CPT | Performed by: NURSE PRACTITIONER

## 2021-11-02 NOTE — PATIENT INSTRUCTIONS
Heart-Healthy Diet: Care Instructions Your Care Instructions A heart-healthy diet has lots of vegetables, fruits, nuts, beans, and whole grains, and is low in salt. It limits foods that are high in saturated fat, such as meats, cheeses, and fried foods. It may be hard to change your diet, but even small changes can lower your risk of heart attack and heart disease. Follow-up care is a key part of your treatment and safety. Be sure to make and go to all appointments, and call your doctor if you are having problems. It's also a good idea to know your test results and keep a list of the medicines you take. How can you care for yourself at home? Watch your portions · Use food labels to learn what the recommended servings are for the foods you eat. · Eat only the number of calories you need to stay at a healthy weight. If you need to lose weight, eat fewer calories than your body burns (through exercise and other physical activity). Eat more fruits and vegetables · Eat a variety of fruit and vegetables every day. Dark green, deep orange, red, or yellow fruits and vegetables are especially good for you. Examples include spinach, carrots, peaches, and berries. · Keep carrots, celery, and other veggies handy for snacks. Buy fruit that is in season and store it where you can see it so that you will be tempted to eat it. · Cook dishes that have a lot of veggies in them, such as stir-fries and soups. Limit saturated fat · Read food labels, and try to avoid saturated fats. They increase your risk of heart disease. · Use olive or canola oil when you cook. · Bake, broil, grill, or steam foods instead of frying them. · Choose lean meats instead of high-fat meats such as hot dogs and sausages. Cut off all visible fat when you prepare meat. · Eat fish, skinless poultry, and meat alternatives such as soy products instead of high-fat meats.  Soy products, such as tofu, may be especially good for your heart. 
· Choose low-fat or fat-free milk and dairy products. Eat foods high in fiber · Eat a variety of grain products every day. Include whole-grain foods that have lots of fiber and nutrients. Examples of whole-grain foods include oats, whole wheat bread, and brown rice. · Buy whole-grain breads and cereals, instead of white bread or pastries. Limit salt and sodium · Limit how much salt and sodium you eat to help lower your blood pressure. · Taste food before you salt it. Add only a little salt when you think you need it. With time, your taste buds will adjust to less salt. · Eat fewer snack items, fast foods, and other high-salt, processed foods. Check food labels for the amount of sodium in packaged foods. · Choose low-sodium versions of canned goods (such as soups, vegetables, and beans). Limit sugar · Limit drinks and foods with added sugar. These include candy, desserts, and soda pop. Limit alcohol · Limit alcohol to no more than 2 drinks a day for men and 1 drink a day for women. Too much alcohol can cause health problems. When should you call for help? Watch closely for changes in your health, and be sure to contact your doctor if: 
  · You would like help planning heart-healthy meals. Where can you learn more? Go to http://www.ruano.com/ Enter V137 in the search box to learn more about \"Heart-Healthy Diet: Care Instructions. \" Current as of: December 17, 2020               Content Version: 13.0 © 2006-2021 Healthwise, Incorporated. Care instructions adapted under license by [employer] (which disclaims liability or warranty for this information). If you have questions about a medical condition or this instruction, always ask your healthcare professional. [place] [address] any warranty or liability for your use of this information. Learning About Benefits From Quitting Smoking How does quitting smoking make you healthier? If you're thinking about quitting smoking, you may have a few reasons to be smoke-free. Your health may be one of them. · When you quit smoking, you lower your risks for cancer, lung disease, heart attack, stroke, blood vessel disease, and blindness from macular degeneration. · When you're smoke-free, you get sick less often, and you heal faster. You are less likely to get colds, flu, bronchitis, and pneumonia. · As a nonsmoker, you may find that your mood is better and you are less stressed. When and how will you feel healthier? Quitting has real health benefits that start from day 1 of being smoke-free. And the longer you stay smoke-free, the healthier you get and the better you feel. The first hours · After just 20 minutes, your blood pressure and heart rate go down. That means there's less stress on your heart and blood vessels. · Within 12 hours, the level of carbon monoxide in your blood drops back to normal. That makes room for more oxygen. With more oxygen in your body, you may notice that you have more energy than when you smoked. After 2 weeks · Your lungs start to work better. · Your risk of heart attack starts to drop. After 1 month · When your lungs are clear, you cough less and breathe deeper, so it's easier to be active. · Your sense of taste and smell return. That means you can enjoy food more than you have since you started smoking. Over the years · Over the years, your risks of heart disease, heart attack, and stroke are lower. · After 10 years, your risk of dying from lung cancer is cut by about half. And your risk for many other types of cancer is lower too. How would quitting help others in your life? When you quit smoking, you improve the health of everyone who now breathes in your smoke. · Their heart, lung, and cancer risks drop, much like yours. · They are sick less.  For babies and small children, living smoke-free means they're less likely to have ear infections, pneumonia, and bronchitis. · If you're a woman who is or will be pregnant someday, quitting smoking means a healthier . · Children who are close to you are less likely to become adult smokers. Where can you learn more? Go to http://www.Priceza.com/ Enter [id number] [id number] [id number] in the search box to learn more about \"Learning About Benefits From Quitting Smoking. \" Current as of: 2021               Content Version: 13.0 © 8971-9571 [employer]. Care instructions adapted under license by [employer] (which disclaims liability or warranty for this information). If you have questions about a medical condition or this instruction, always ask your healthcare professional. [place] [address] any warranty or liability for your use of this information. Stopping Smoking: Care Instructions Your Care Instructions Cigarette smokers crave the nicotine in cigarettes. Giving it up is much harder than simply changing a habit. Your body has to stop craving the nicotine. It is hard to quit, but you can do it. There are many tools that people use to quit smoking. You may find that combining tools works best for you. There are several steps to quitting. First you get ready to quit. Then you get support to help you. After that, you learn new skills and behaviors to become a nonsmoker. For many people, a necessary step is getting and using medicine. Your doctor will help you set up the plan that best meets your needs. You may want to attend a smoking cessation program to help you quit smoking. When you choose a program, look for one that has proven success. Ask your doctor for ideas. You will greatly increase your chances of success if you take medicine as well as get counseling or join a cessation program. 
Some of the changes you feel when you first quit tobacco are uncomfortable.  Your body will miss the nicotine at first, and you may feel short-tempered and grumpy. You may have trouble sleeping or concentrating. Medicine can help you deal with these symptoms. You may struggle with changing your smoking habits and rituals. The last step is the tricky one: Be prepared for the smoking urge to continue for a time. This is a lot to deal with, but keep at it. You will feel better. Follow-up care is a key part of your treatment and safety. Be sure to make and go to all appointments, and call your doctor if you are having problems. It's also a good idea to know your test results and keep a list of the medicines you take. How can you care for yourself at home? · Ask your family, friends, and coworkers for support. You have a better chance of quitting if you have help and support. · Join a support group, such as Nicotine Anonymous, for people who are trying to quit smoking. · Consider signing up for a smoking cessation program, such as the American Lung Association's Freedom from Smoking program. 
· Get text messaging support. Go to the website at www.smokefree. gov to sign up for the [facility] program. 
· Set a quit date. Pick your date carefully so that it is not right in the middle of a big deadline or stressful time. Once you quit, do not even take a puff. Get rid of all ashtrays and lighters after your last cigarette. Clean your house and your clothes so that they do not smell of smoke. · Learn how to be a nonsmoker. Think about ways you can avoid those things that make you reach for a cigarette. ? Avoid situations that put you at greatest risk for smoking. For some people, it is hard to have a drink with friends without smoking. For others, they might skip a coffee break with coworkers who smoke. ? Change your daily routine. Take a different route to work or eat a meal in a different place. · Cut down on stress. Calm yourself or release tension by doing an activity you enjoy, such as reading a book, taking a hot bath, or gardening.  
· Talk to your doctor or pharmacist about nicotine replacement therapy, which replaces the nicotine in your body. You still get nicotine but you do not use tobacco. Nicotine replacement products help you slowly reduce the amount of nicotine you need. These products come in several forms, many of them available over-the-counter: ? Nicotine patches ? Nicotine gum and lozenges ? Nicotine inhaler · Ask your doctor about bupropion (Wellbutrin) or varenicline (Chantix), which are prescription medicines. They do not contain nicotine. They help you by reducing withdrawal symptoms, such as stress and anxiety. · Some people find hypnosis, acupuncture, and massage helpful for ending the smoking habit. · Eat a healthy diet and get regular exercise. Having healthy habits will help your body move past its craving for nicotine. · Be prepared to keep trying. Most people are not successful the first few times they try to quit. Do not get mad at yourself if you smoke again. Make a list of things you learned and think about when you want to try again, such as next week, next month, or next year. Where can you learn more? Go to http://www.gray.com/ Enter R516 in the search box to learn more about \"Stopping Smoking: Care Instructions. \" Current as of: February 11, 2021               Content Version: 13.0 © 2006-2021 Healthwise, Incorporated. Care instructions adapted under license by [employer] (which disclaims liability or warranty for this information). If you have questions about a medical condition or this instruction, always ask your healthcare professional. [place] [address] any warranty or liability for your use of this information.

## 2021-11-02 NOTE — PROGRESS NOTES
Subjective:     Dennis Nichols is a 49 y.o. female who presents today with the following:  Chief Complaint   Patient presents with    Well Woman     no pap   brother in hospice with lung cancer. We discussed smoking cessation . Reminded her that she has programs available at work. Patient Active Problem List   Diagnosis Code    Hypertension I10    Vitamin D deficiency E55.9    Hypokalemia E87.6    Migraine headache G43.909    CAD (coronary artery disease) I25.10    Pelvic mass R19.00    Abnormal vaginal bleeding N93.9    Hypothyroidism E03.9    Vertigo R42    Pre-diabetes R73.03    Severe obesity (HCC) E66.01    Graves disease E05.00    Biliary dyskinesia K82.8         COMPLIANT WITH MEDICATION:   HTN; Denies chest pain, dyspnea, palpitations, headache and blurred vision. Blood pressure normotensive. Taking Spironolactone, lotensin and amlodipine for HTN. Hyperlipidemia; Denies chest pain, dyspnea, palpitations, headache and blurred vision. Blood pressure normotensive. Taking Spironolactone, lotensin and amlodipine for HTN. Taking pravastatin for cholesterol      depression screening addressed not at risk    abuse screening addressed denies    learning assessment addressed reviewed nurses notes    fall risk addressed not at risk    HM: addressed had labs at Dr. Pat Martinez .      ROS:  Gen: denies fever, chills, fatigue, weight loss, weight gain  HEENT:denies blurry vision, nasal congestion, sore throat  Resp: denies dypsnea, cough, wheezing  CV: denies chest pain radiating to the jaws or arms, palpitations, lower extremity edema  Abd: denies nausea, vomiting, diarrhea, constipation  Neuro: denies numbness/tingling  Endo: denies polyuria, polydipsia, heat/cold intolerance  Heme: no lymphadenopathy    Allergies   Allergen Reactions    Dilaudid [Hydromorphone (Bulk)] Unknown (comments)     Respiratory suppression leading to intubation         Current Outpatient Medications:     topiramate (TOPAMAX) 25 mg tablet, Take 1 Tablet by mouth two (2) times a day., Disp: 90 Tablet, Rfl: 1    benazepriL (LOTENSIN) 40 mg tablet, Take 1 Tablet by mouth daily. , Disp: 90 Tablet, Rfl: 0    spironolactone (ALDACTONE) 25 mg tablet, Take 1 Tablet by mouth daily. Indications: hypokalemia prevention, Disp: 90 Tablet, Rfl: 1    pravastatin (PRAVACHOL) 10 mg tablet, Take 1 Tablet by mouth daily. , Disp: 90 Tablet, Rfl: 1    amLODIPine (NORVASC) 10 mg tablet, Take 1 Tablet by mouth daily. , Disp: 90 Tablet, Rfl: 0    potassium chloride (K-DUR, KLOR-CON) 20 mEq tablet, Take 1 Tablet by mouth daily. Indications: low amount of potassium in the blood, Disp: 90 Tablet, Rfl: 1    butalbital-acetaminophen-caffeine (FIORICET, ESGIC) -40 mg per tablet, Take 1 Tablet by mouth every six (6) hours as needed for Headache. Indications: a migraine headache, Disp: 30 Tablet, Rfl: 1    cetirizine (ZYRTEC) 10 mg tablet, TAKE 1 TABLET BY MOUTH AT BEDTIME AS NEEDED FOR  ALLERGIES, Disp: 30 Tablet, Rfl: 0    cholecalciferol, vitamin D3, (VITAMIN D3 PO), Take  by mouth. 125 mcg 5000 IU 1 A DAY , Disp: , Rfl:     pantoprazole (PROTONIX) 40 mg tablet, PRN, Disp: , Rfl:     ondansetron (Zofran ODT) 4 mg disintegrating tablet, Take 1 Tab by mouth every eight (8) hours as needed for Nausea., Disp: 10 Tab, Rfl: 0    ondansetron (Zofran ODT) 4 mg disintegrating tablet, 1 Tab by SubLINGual route every eight (8) hours as needed for Nausea or Vomiting., Disp: 15 Tab, Rfl: 0    methocarbamoL (ROBAXIN) 500 mg tablet, Take 1 Tab by mouth three (3) times daily. Indications: muscle spasm, Disp: 30 Tab, Rfl: 0    triamcinolone acetonide (KENALOG) 0.1 % topical cream, Apply  to affected area two (2) times a day.  use thin layer, Disp: 15 g, Rfl: 0    Wheat Dextrin (Benefiber Clear) 3 gram/3.5 gram pwpk, Take 1 Each by mouth nightly., Disp: 28 Packet, Rfl: 1    metFORMIN ER (GLUCOPHAGE XR) 500 mg tablet, Take 1 tablet by mouth twice daily (Patient taking differently: 1 a day), Disp: 180 Tab, Rfl: 0    diclofenac EC (VOLTAREN) 50 mg EC tablet, Take 1 Tab by mouth two (2) times a day., Disp: 30 Tab, Rfl: 0    MULTIVITAMIN PO, Take  by mouth daily. , Disp: , Rfl:     omeprazole (PRILOSEC) 20 mg capsule, Not a current medication, Disp: , Rfl: 0    fluticasone propionate (FLONASE) 50 mcg/actuation nasal spray, 2 sprays per nostril per day  Indications: inflammation of the nose due to an allergy (Patient taking differently: 2 sprays per nostril per day PRN  Indications: inflammation of the nose due to an allergy), Disp: 1 Bottle, Rfl: 3    levothyroxine (SYNTHROID) 88 mcg tablet, Take 1 Tab by mouth Daily (before breakfast). , Disp: 90 Tab, Rfl: 4    naproxen sodium 220 mg cap, Take 220 Caps by mouth daily as needed. 2 a day, Disp: , Rfl:     aspirin 81 mg chewable tablet, Take 1 Tab by mouth daily. , Disp: 30 Tab, Rfl: 11    Past Medical History:   Diagnosis Date    CAD (coronary artery disease) 10/2013    Colon polyps     Fatty liver     GERD (gastroesophageal reflux disease)     Graves disease     hypothyroid now since procedure    H. pylori infection     Headache     sometimes migraines    Hypertension     Hypokalemia     Menopause     Nausea & vomiting     Pneumothorax ~    Sleep apnea     cpap compliant    Vitamin D deficiency        Past Surgical History:   Procedure Laterality Date    HX BILATERAL SALPINGO-OOPHORECTOMY      HX CHOLECYSTECTOMY  2019    Lap debo w/cholangiogram    HX COLONOSCOPY      HX ENDOSCOPY      HX HEENT      Radioactive thyroid    HX HYSTERECTOMY      PARTIAL    [place] CARDIAC SURG PROCEDURE UNLIST  10-    CARDIAC CATH       Social History     Tobacco Use   Smoking Status Current Every Day Smoker    Packs/day: 1.00    Years: 10.00    Pack years: 10.00    Last attempt to quit: 10/12/2013    Years since quittin.0   Smokeless Tobacco Never Used       Social History     Socioeconomic History    Marital status: SINGLE     Spouse name: Not on file    Number of children: Not on file    Years of education: Not on file    Highest education level: Not on file   Tobacco Use    Smoking status: Current Every Day Smoker     Packs/day: 1.00     Years: 10.00     Pack years: 10.00     Last attempt to quit: 10/12/2013     Years since quittin.0    Smokeless tobacco: Never Used   Vaping Use    Vaping Use: Never used   Substance and Sexual Activity    Alcohol use: Yes     Alcohol/week: 0.0 standard drinks     Comment: some weekends one or two beers    Drug use: No     Social Determinants of Health     Financial Resource Strain:     Difficulty of Paying Living Expenses:    Food Insecurity:     Worried About Running Out of Food in the Last Year:     [address] in the Last Year:    Transportation Needs:     Lack of Transportation (Medical):      Lack of Transportation (Non-Medical):    Physical Activity:     Days of Exercise per Week:     Minutes of Exercise per Session:    Stress:     Feeling of Stress :    Social Connections:     Frequency of Communication with Friends and Family:     Frequency of Social Gatherings with Friends and Family:     Attends Baptism Services:     Active Member of Clubs or Organizations:     Attends Club or Organization Meetings:     Marital Status:        Family History   Problem Relation Age of Onset    Kidney Disease Mother     Hypertension Mother     Diabetes Mother     Heart Disease Mother     Cancer Father         Brain    Heart Disease Brother     Hypertension Brother     Diabetes Maternal Grandmother     Cancer Maternal Grandmother     Heart Disease Maternal Grandmother     Hypertension Brother     Hypertension Sister     Diabetes Sister     Hypertension Sister     Hypertension Sister     Glaucoma Brother          Objective:     Visit Vitals  /74 (BP 1 Location: Right upper arm, BP Patient Position: Sitting, BP Cuff Size: Large adult)   Pulse 66 Temp 98.1 °F (36.7 °C) (Temporal)   Resp 18   Ht 5' 6\" (1.676 m)   Wt 210 lb 9.6 oz (95.5 kg)   SpO2 100%   BMI 33.99 kg/m²     Body mass index is 33.99 kg/m². General: Alert and oriented. No acute distress. Well nourished  HEENT :  Ears:TMs are normal. Canals are clear. Eyes: pupils equal, round, react to light and accommodation. Extra ocular movements intact. Nose: patent. Mouth and throat is clear. Neck:supple full range of motion no thyromegaly. Trachea midline, No carotid bruits. No significant lymphadenopathy  Lungs[de-identified] clear to auscultation without wheezes, rales, or rhonchi. Heart :RRR, S1 & S2 are normal intensity. No murmur; no gallop  Abdomen: bowel sounds active. No tenderness, guarding, rebound, masses, hepatic or spleen enlargement  Back: no CVA tenderness. Extremities: without clubbing, cyanosis, or edema  Pulses: radial and femoral pulses are normal  Neuro: HMF intact. Cranial nerves II through XII grossly normal.  Motor: is 5 over 5 and symmetrical.   Deep tendon reflexes: +2 equal  Psych:appropriate behavior, mood, affect and judgement. Results for orders placed or performed in visit on 10/12/21   H PYLORI AB, IGG, QT   Result Value Ref Range    H. pylori Ab, IgG 0.42 0.00 - 0.79 Index Value   H PYLORI AB, IGA   Result Value Ref Range    H. pylori Ab, IgA <9.0 0.0 - 8.9 units   SAMPLES BEING HELD   Result Value Ref Range    SAMPLES BEING HELD 2SST     COMMENT        Add-on orders for these samples will be processed based on acceptable specimen integrity and analyte stability, which may vary by analyte. No results found for this visit on 11/02/21. Assessment/ Plan:     1. Well adult exam  No change in medical management   No change to medication lisited above. 2. Hyperlipidemia, unspecified hyperlipidemia type  Recommend heart healthy diet   Continue on Pravachol as prescribed.     3. Essential hypertension  BP in goal continue on medication listed      benazepriL (LOTENSIN) 40 mg tablet, Take 1 Tablet by mouth daily. , Disp: 90 Tablet, Rfl: 0    spironolactone (ALDACTONE) 25 mg tablet, Take 1 Tablet by mouth daily. Indications: hypokalemia prevention, Disp: 90 Tablet, Rfl: 1    pravastatin (PRAVACHOL) 10 mg tablet, Take 1 Tablet by mouth daily. , Disp: 90 Tablet, Rfl: 1    amLODIPine (NORVASC) 10 mg tablet, Take 1 Tablet by mouth daily. , Disp: 90 Tablet, Rfl: 0    potassium chloride (K-DUR, KLOR-CON) 20 mEq tablet, Take 1 Tablet by mouth daily. Indications: low amount of potassium in the blood, Disp: 90 Tablet, Rfl: 1  4. Prediabetes    Continue lifestyle changes of diet and exercise. No orders of the defined types were placed in this encounter. Verbal and written instructions (see AVS) provided. Patient expresses understanding of diagnosis and treatment plan. Health Maintenance Due   Topic Date Due    A1C test (Diabetic or Prediabetic)  10/21/2021    Lipid Screen  10/21/2021     Labs done with Dr. Sarah Tate. Patient will contact office to have labs sent to our office.           SPENSER Martinez

## 2021-11-02 NOTE — PROGRESS NOTES
1. Have you been to the ER, urgent care clinic since your last visit? Hospitalized since your last visit? No    2. Have you seen or consulted any other health care providers outside of the [address] [place] [address] since your last visit? Include any pap smears or colon screening.  No      Chief Complaint   Patient presents with    Well Woman     no pap         Visit Vitals  /74 (BP 1 Location: Right upper arm, BP Patient Position: Sitting, BP Cuff Size: Large adult)   Pulse 66   Temp 98.1 °F (36.7 °C) (Temporal)   Resp 18   Ht 5' 6\" (1.676 m)   Wt 210 lb 9.6 oz (95.5 kg)   SpO2 100%   BMI 33.99 kg/m²       Pain Scale: 0 - No pain/10  Pain Location:

## 2021-12-17 ENCOUNTER — CLINICAL SUPPORT (OUTPATIENT)
Dept: FAMILY MEDICINE CLINIC | Age: 51
End: 2021-12-17

## 2021-12-17 DIAGNOSIS — R10.11 RUQ PAIN: Primary | ICD-10-CM

## 2021-12-18 LAB
ALBUMIN SERPL-MCNC: 3.8 G/DL (ref 3.5–5)
ALBUMIN/GLOB SERPL: 1.3 {RATIO} (ref 1.1–2.2)
ALP SERPL-CCNC: 110 U/L (ref 45–117)
ALT SERPL-CCNC: 28 U/L (ref 12–78)
ANION GAP SERPL CALC-SCNC: 6 MMOL/L (ref 5–15)
AST SERPL-CCNC: 15 U/L (ref 15–37)
BASOPHILS # BLD: 0.1 K/UL (ref 0–0.1)
BASOPHILS NFR BLD: 1 % (ref 0–1)
BILIRUB SERPL-MCNC: 0.2 MG/DL (ref 0.2–1)
BUN SERPL-MCNC: 24 MG/DL (ref 6–20)
BUN/CREAT SERPL: 28 (ref 12–20)
CALCIUM SERPL-MCNC: 9.2 MG/DL (ref 8.5–10.1)
CHLORIDE SERPL-SCNC: 115 MMOL/L (ref 97–108)
CO2 SERPL-SCNC: 21 MMOL/L (ref 21–32)
CREAT SERPL-MCNC: 0.85 MG/DL (ref 0.55–1.02)
DIFFERENTIAL METHOD BLD: NORMAL
EOSINOPHIL # BLD: 0.1 K/UL (ref 0–0.4)
EOSINOPHIL NFR BLD: 2 % (ref 0–7)
ERYTHROCYTE [DISTWIDTH] IN BLOOD BY AUTOMATED COUNT: 14.5 % (ref 11.5–14.5)
GLOBULIN SER CALC-MCNC: 3 G/DL (ref 2–4)
GLUCOSE SERPL-MCNC: 108 MG/DL (ref 65–100)
HCT VFR BLD AUTO: 38.6 % (ref 35–47)
HGB BLD-MCNC: 12.5 G/DL (ref 11.5–16)
IMM GRANULOCYTES # BLD AUTO: 0 K/UL (ref 0–0.04)
IMM GRANULOCYTES NFR BLD AUTO: 0 % (ref 0–0.5)
LYMPHOCYTES # BLD: 2.6 K/UL (ref 0.8–3.5)
LYMPHOCYTES NFR BLD: 31 % (ref 12–49)
MCH RBC QN AUTO: 31.5 PG (ref 26–34)
MCHC RBC AUTO-ENTMCNC: 32.4 G/DL (ref 30–36.5)
MCV RBC AUTO: 97.2 FL (ref 80–99)
MONOCYTES # BLD: 0.7 K/UL (ref 0–1)
MONOCYTES NFR BLD: 8 % (ref 5–13)
NEUTS SEG # BLD: 4.8 K/UL (ref 1.8–8)
NEUTS SEG NFR BLD: 58 % (ref 32–75)
NRBC # BLD: 0 K/UL (ref 0–0.01)
NRBC BLD-RTO: 0 PER 100 WBC
PLATELET # BLD AUTO: 276 K/UL (ref 150–400)
PMV BLD AUTO: 10.8 FL (ref 8.9–12.9)
POTASSIUM SERPL-SCNC: 3.4 MMOL/L (ref 3.5–5.1)
PROT SERPL-MCNC: 6.8 G/DL (ref 6.4–8.2)
RBC # BLD AUTO: 3.97 M/UL (ref 3.8–5.2)
SODIUM SERPL-SCNC: 142 MMOL/L (ref 136–145)
WBC # BLD AUTO: 8.2 K/UL (ref 3.6–11)

## 2022-01-24 RX ORDER — IBUPROFEN 200 MG
TABLET ORAL
Qty: 30 PATCH | Refills: 0 | Status: SHIPPED | OUTPATIENT
Start: 2022-01-24 | End: 2022-07-06

## 2022-02-01 ENCOUNTER — OFFICE VISIT (OUTPATIENT)
Dept: FAMILY MEDICINE CLINIC | Age: 52
End: 2022-02-01
Payer: COMMERCIAL

## 2022-02-01 VITALS
WEIGHT: 211 LBS | OXYGEN SATURATION: 99 % | BODY MASS INDEX: 33.91 KG/M2 | SYSTOLIC BLOOD PRESSURE: 130 MMHG | HEART RATE: 78 BPM | RESPIRATION RATE: 16 BRPM | HEIGHT: 66 IN | TEMPERATURE: 97 F | DIASTOLIC BLOOD PRESSURE: 80 MMHG

## 2022-02-01 DIAGNOSIS — M54.2 NECK PAIN: Primary | ICD-10-CM

## 2022-02-01 PROCEDURE — 99213 OFFICE O/P EST LOW 20 MIN: CPT | Performed by: NURSE PRACTITIONER

## 2022-02-01 RX ORDER — METHOCARBAMOL 500 MG/1
500 TABLET, FILM COATED ORAL 3 TIMES DAILY
Qty: 30 TABLET | Refills: 0 | Status: SHIPPED | OUTPATIENT
Start: 2022-02-01 | End: 2022-02-03

## 2022-02-01 NOTE — PROGRESS NOTES
1. Have you been to the ER, urgent care clinic since your last visit? Hospitalized since your last visit? no    2. Have you seen or consulted any other health care providers outside of the [address] [place] [address] since your last visit? Include any pap smears or colon screening.  No        Chief Complaint   Patient presents with    Neck Pain     started yesterday         Visit Vitals  /80 (BP 1 Location: Right upper arm, BP Patient Position: Sitting, BP Cuff Size: Large adult)   Pulse 78   Temp 97 °F (36.1 °C) (Temporal)   Resp 16   Ht 5' 6\" (1.676 m)   Wt 211 lb (95.7 kg)   SpO2 99%   BMI 34.06 kg/m²       Pain Scale: 8/10  Pain Location: Neck

## 2022-02-02 NOTE — PROGRESS NOTES
Subjective:      Baltazar Fisher is an 49 y.o. female who presents for evaluation and treatment  of neck pain. Onset of symptoms 1 day ago, intermittent since that time. Current symptoms are pain in back of the neck (aching in character; 4/10 in severity), stiffness in ROM, denies weakness, denies numbness, denies paresthesias. Patient denies numbness, tingling, paresthesias in upper extremities. Patient denies weakness, diminished  strength, lack of coordination. Event that precipitate these symptoms: long hours sitting in a snow plow. Patient has had recurrent self limited episodes of neck pain in the past.  Previous treatments include: medication: muscle relaxant: Robaxin. Back of the neck  Past Medical History:   Diagnosis Date    CAD (coronary artery disease) 10/2013    Colon polyps     Fatty liver     GERD (gastroesophageal reflux disease)     Graves disease     hypothyroid now since procedure    H. pylori infection     Headache     sometimes migraines    Hypertension     Hypokalemia     Menopause     Nausea & vomiting     Pneumothorax ~2012    Sleep apnea     cpap compliant    Vitamin D deficiency      Family History   Problem Relation Age of Onset    Kidney Disease Mother     Hypertension Mother     Diabetes Mother     Heart Disease Mother     Cancer Father         Brain    Heart Disease Brother     Hypertension Brother     Diabetes Maternal Grandmother     Cancer Maternal Grandmother     Heart Disease Maternal Grandmother     Hypertension Brother     Hypertension Sister     Diabetes Sister     Hypertension Sister     Hypertension Sister     Glaucoma Brother      Current Outpatient Medications   Medication Sig Dispense Refill    methocarbamoL (ROBAXIN) 500 mg tablet Take 1 Tablet by mouth three (3) times daily.  Indications: muscle spasm 30 Tablet 0    nicotine (NICODERM CQ) 14 mg/24 hr patch APPLY 1 PATCH TOPICALLY EVERY 24 HOURS FOR 30 DAYS 30 Patch 0    cetirizine (ZYRTEC) 10 mg tablet TAKE 1 TABLET BY MOUTH AT BEDTIME AS NEEDED FOR ALLERGIES 30 Tablet 0    topiramate (TOPAMAX) 25 mg tablet Take 1 tablet by mouth twice daily 90 Tablet 1    benazepriL (LOTENSIN) 40 mg tablet Take 1 Tablet by mouth daily. 90 Tablet 0    pravastatin (PRAVACHOL) 10 mg tablet Take 1 Tablet by mouth daily. 90 Tablet 1    amLODIPine (NORVASC) 10 mg tablet Take 1 Tablet by mouth daily. 90 Tablet 0    potassium chloride (K-DUR, KLOR-CON) 20 mEq tablet Take 1 Tablet by mouth daily. Indications: low amount of potassium in the blood 90 Tablet 1    butalbital-acetaminophen-caffeine (FIORICET, ESGIC) -40 mg per tablet Take 1 Tablet by mouth every six (6) hours as needed for Headache. Indications: a migraine headache 30 Tablet 1    cholecalciferol, vitamin D3, (VITAMIN D3 PO) Take  by mouth. 125 mcg 5000 IU 1 A DAY       pantoprazole (PROTONIX) 40 mg tablet PRN      ondansetron (Zofran ODT) 4 mg disintegrating tablet Take 1 Tab by mouth every eight (8) hours as needed for Nausea. 10 Tab 0    ondansetron (Zofran ODT) 4 mg disintegrating tablet 1 Tab by SubLINGual route every eight (8) hours as needed for Nausea or Vomiting. 15 Tab 0    metFORMIN ER (GLUCOPHAGE XR) 500 mg tablet Take 1 tablet by mouth twice daily (Patient taking differently: 1 a day) 180 Tab 0    diclofenac EC (VOLTAREN) 50 mg EC tablet Take 1 Tab by mouth two (2) times a day. 30 Tab 0    MULTIVITAMIN PO Take  by mouth daily.  omeprazole (PRILOSEC) 20 mg capsule Not a current medication  0    fluticasone propionate (FLONASE) 50 mcg/actuation nasal spray 2 sprays per nostril per day  Indications: inflammation of the nose due to an allergy (Patient taking differently: 2 sprays per nostril per day PRN  Indications: inflammation of the nose due to an allergy) 1 Bottle 3    levothyroxine (SYNTHROID) 88 mcg tablet Take 1 Tab by mouth Daily (before breakfast).  90 Tab 4    naproxen sodium 220 mg cap Take 220 Caps by mouth daily as needed. 2 a day      aspirin 81 mg chewable tablet Take 1 Tab by mouth daily. 30 Tab 11    spironolactone (ALDACTONE) 25 mg tablet Take 1 Tablet by mouth daily. Indications: hypokalemia prevention 90 Tablet 1    triamcinolone acetonide (KENALOG) 0.1 % topical cream Apply  to affected area two (2) times a day. use thin layer 15 g 0    Wheat Dextrin (Benefiber Clear) 3 gram/3.5 gram pwpk Take 1 Each by mouth nightly. 28 Packet 1     Allergies   Allergen Reactions    Dilaudid [Hydromorphone (Bulk)] Unknown (comments)     Respiratory suppression leading to intubation     Social History     Socioeconomic History    Marital status: SINGLE     Spouse name: Not on file    Number of children: Not on file    Years of education: Not on file    Highest education level: Not on file   Occupational History    Not on file   Tobacco Use    Smoking status: Former Smoker     Packs/day: 1.00     Years: 10.00     Pack years: 10.00     Quit date: 2022     Years since quittin.0    Smokeless tobacco: Never Used   Vaping Use    Vaping Use: Never used   Substance and Sexual Activity    Alcohol use: Yes     Alcohol/week: 0.0 standard drinks     Comment: some weekends one or two beers    Drug use: No    Sexual activity: Not on file   Other Topics Concern    Not on file   Social History Narrative    Not on file     Social Determinants of Health     Financial Resource Strain:     Difficulty of Paying Living Expenses: Not on file   Food Insecurity:     Worried About Running Out of Food in the Last Year: Not on file    John of Food in the Last Year: Not on file   Transportation Needs:     Lack of Transportation (Medical): Not on file    Lack of Transportation (Non-Medical):  Not on file   Physical Activity:     Days of Exercise per Week: Not on file    Minutes of Exercise per Session: Not on file   Stress:     Feeling of Stress : Not on file   Social Connections:     Frequency of Communication with Friends and Family: Not on file    Frequency of Social Gatherings with Friends and Family: Not on file    Attends Islam Services: Not on file    Active Member of Clubs or Organizations: Not on file    Attends Club or Organization Meetings: Not on file    Marital Status: Not on file   Intimate Partner Violence:     Fear of Current or Ex-Partner: Not on file    Emotionally Abused: Not on file    Physically Abused: Not on file    Sexually Abused: Not on file   Housing Stability:     Unable to Pay for Housing in the Last Year: Not on file    Number of [place] in the Last Year: Not on file    Unstable Housing in the Last Year: Not on file       Review of Systems  A comprehensive review of systems was negative except for that written in the HPI. Objective:     Visit Vitals  /80 (BP 1 Location: Right upper arm, BP Patient Position: Sitting, BP Cuff Size: Large adult)   Pulse 78   Temp 97 °F (36.1 °C) (Temporal)   Resp 16   Ht 5' 6\" (1.676 m)   Wt 211 lb (95.7 kg)   SpO2 99%   BMI 34.06 kg/m²     General:   alert, cooperative, no distress, appears stated age   External Deformity:  none   Midline Tenderness:  moderate in midline   Paraspinous tenderness:  absent    UE Neurologic Exam:  unremarkable   ROM Cervical Spine:  limited right lateral flexion and left lateral flexion     X-ray of the cervical spine:   Patient declined at this time. Assessment:     Cervical pain. .  Severity of pain: moderate    Plan:   Discussed the cervical pain, its course and treatment. started muscle relaxants per med orders. Orders Placed This Encounter    methocarbamoL (ROBAXIN) 500 mg tablet     Sig: Take 1 Tablet by mouth three (3) times daily.  Indications: muscle spasm     Dispense:  30 Tablet     Refill:  0     Zacarias FIORE

## 2022-02-02 NOTE — ACP (ADVANCE CARE PLANNING)
Discussed importance of advanced medical directives with patient. Patient is capable of making decisions.  Rehan Lua NP-C

## 2022-02-03 ENCOUNTER — OFFICE VISIT (OUTPATIENT)
Dept: FAMILY MEDICINE CLINIC | Age: 52
End: 2022-02-03
Payer: COMMERCIAL

## 2022-02-03 ENCOUNTER — TELEPHONE (OUTPATIENT)
Dept: FAMILY MEDICINE CLINIC | Age: 52
End: 2022-02-03

## 2022-02-03 VITALS
TEMPERATURE: 97.4 F | HEIGHT: 66 IN | DIASTOLIC BLOOD PRESSURE: 70 MMHG | WEIGHT: 211 LBS | BODY MASS INDEX: 33.91 KG/M2 | HEART RATE: 96 BPM | OXYGEN SATURATION: 99 % | RESPIRATION RATE: 14 BRPM | SYSTOLIC BLOOD PRESSURE: 110 MMHG

## 2022-02-03 DIAGNOSIS — M62.838 MUSCLE SPASMS OF NECK: Primary | ICD-10-CM

## 2022-02-03 PROCEDURE — 99213 OFFICE O/P EST LOW 20 MIN: CPT | Performed by: NURSE PRACTITIONER

## 2022-02-03 PROCEDURE — 96372 THER/PROPH/DIAG INJ SC/IM: CPT | Performed by: NURSE PRACTITIONER

## 2022-02-03 RX ORDER — CYCLOBENZAPRINE HCL 10 MG
10 TABLET ORAL
Qty: 10 TABLET | Refills: 0 | Status: SHIPPED | OUTPATIENT
Start: 2022-02-03 | End: 2022-09-15

## 2022-02-03 RX ORDER — TRIAMCINOLONE ACETONIDE 40 MG/ML
40 INJECTION, SUSPENSION INTRA-ARTICULAR; INTRAMUSCULAR ONCE
Qty: 1 ML | Refills: 0
Start: 2022-02-03 | End: 2022-02-03

## 2022-02-03 NOTE — PROGRESS NOTES
Patient was administered triamcinolone 40 mg via IM in left deltoid  Patient tolerated medication side effects noted  Medication information reviewed with patient, patient states understanding. Patient to resume routine medications at home. Patient given copy of AVS with medication information and instructions for home. AVS reviewed with patient and patient states understanding. 1. Have you been to the ER, urgent care clinic since your last visit? Hospitalized since your last visit? No    2. Have you seen or consulted any other health care providers outside of the [address] [place] [address] since your last visit? Include any pap smears or colon screening.  No        Chief Complaint   Patient presents with    Neck Pain         Visit Vitals  /70 (BP 1 Location: Right upper arm, BP Patient Position: Sitting, BP Cuff Size: Large adult)   Pulse 96   Temp 97.4 °F (36.3 °C) (Temporal)   Resp 14   Ht 5' 6\" (1.676 m)   Wt 211 lb (95.7 kg)   SpO2 99%   BMI 34.06 kg/m²       Pain Scale: 7/10  Pain Location: Neck

## 2022-02-03 NOTE — LETTER
NOTIFICATION RETURN TO WORK / SCHOOL    2/3/2022 1:34 PM    Ms. [address]  [address]      To Whom It May Concern:    Girish Marquez is currently under the care of Dinh Gaviria. Starting February 2nd 2022    She will return to workl on: February 7th 2022    If there are questions or concerns please have the patient contact our office.         Sincerely,      Riccardo Gray NP

## 2022-02-03 NOTE — PROGRESS NOTES
Subjective:     Cecil Parada is a 49 y.o. female who presents today with the following:  Chief Complaint   Patient presents with    Neck Pain       Patient Active Problem List   Diagnosis Code    Hypertension I10    Vitamin D deficiency E55.9    Hypokalemia E87.6    Migraine headache G43.909    CAD (coronary artery disease) I25.10    Pelvic mass R19.00    Abnormal vaginal bleeding N93.9    Hypothyroidism E03.9    Vertigo R42    Pre-diabetes R73.03    Severe obesity ([address] E66.01    Graves disease E05.00    Biliary dyskinesia K82.8     Neck Pain: Patient endorses neck pain for the past week. She states she was pushing snow when this pain began. She has tried robaxin with no relief. She states her pain is 7/10. She feels it is a \"spasm\" type pain and is intermittent in nature. COMPLIANT WITH MEDICATION:   HTN; Denies chest pain, dyspnea, palpitations, headache and blurred vision. Blood pressure normotensive.     depression screening addressed not at risk    abuse screening addressed denies    learning assessment addressed reviewed nurses notes    fall risk addressed not at risk    HM: addressed-deferred at this time by patient    ROS:  Gen: denies fever, chills, fatigue, weight loss, weight gain  HEENT:denies blurry vision, nasal congestion, sore throat +neck pain  Resp: denies dypsnea, cough, wheezing  CV: denies chest pain radiating to the jaws or arms, palpitations, lower extremity edema  Abd: denies nausea, vomiting, diarrhea, constipation  Neuro: denies numbness/tingling  Endo: denies polyuria, polydipsia, heat/cold intolerance  Heme: no lymphadenopathy    Allergies   Allergen Reactions    Dilaudid [Hydromorphone (Bulk)] Unknown (comments)     Respiratory suppression leading to intubation         Current Outpatient Medications:     triamcinolone acetonide (Kenalog) 40 mg/mL injection, 1 mL by IntraMUSCular route once for 1 dose., Disp: 1 mL, Rfl: 0    cyclobenzaprine (FLEXERIL) 10 mg tablet, Take 1 Tablet by mouth nightly. Indications: muscle spasm, Disp: 10 Tablet, Rfl: 0    nicotine (NICODERM CQ) 14 mg/24 hr patch, APPLY 1 PATCH TOPICALLY EVERY 24 HOURS FOR 30 DAYS, Disp: 30 Patch, Rfl: 0    cetirizine (ZYRTEC) 10 mg tablet, TAKE 1 TABLET BY MOUTH AT BEDTIME AS NEEDED FOR ALLERGIES, Disp: 30 Tablet, Rfl: 0    topiramate (TOPAMAX) 25 mg tablet, Take 1 tablet by mouth twice daily, Disp: 90 Tablet, Rfl: 1    benazepriL (LOTENSIN) 40 mg tablet, Take 1 Tablet by mouth daily. , Disp: 90 Tablet, Rfl: 0    spironolactone (ALDACTONE) 25 mg tablet, Take 1 Tablet by mouth daily. Indications: hypokalemia prevention, Disp: 90 Tablet, Rfl: 1    pravastatin (PRAVACHOL) 10 mg tablet, Take 1 Tablet by mouth daily. , Disp: 90 Tablet, Rfl: 1    amLODIPine (NORVASC) 10 mg tablet, Take 1 Tablet by mouth daily. , Disp: 90 Tablet, Rfl: 0    potassium chloride (K-DUR, KLOR-CON) 20 mEq tablet, Take 1 Tablet by mouth daily. Indications: low amount of potassium in the blood, Disp: 90 Tablet, Rfl: 1    butalbital-acetaminophen-caffeine (FIORICET, ESGIC) -40 mg per tablet, Take 1 Tablet by mouth every six (6) hours as needed for Headache. Indications: a migraine headache, Disp: 30 Tablet, Rfl: 1    cholecalciferol, vitamin D3, (VITAMIN D3 PO), Take  by mouth. 125 mcg 5000 IU 1 A DAY , Disp: , Rfl:     pantoprazole (PROTONIX) 40 mg tablet, PRN, Disp: , Rfl:     ondansetron (Zofran ODT) 4 mg disintegrating tablet, Take 1 Tab by mouth every eight (8) hours as needed for Nausea., Disp: 10 Tab, Rfl: 0    ondansetron (Zofran ODT) 4 mg disintegrating tablet, 1 Tab by SubLINGual route every eight (8) hours as needed for Nausea or Vomiting., Disp: 15 Tab, Rfl: 0    triamcinolone acetonide (KENALOG) 0.1 % topical cream, Apply  to affected area two (2) times a day.  use thin layer, Disp: 15 g, Rfl: 0    metFORMIN ER (GLUCOPHAGE XR) 500 mg tablet, Take 1 tablet by mouth twice daily (Patient taking differently: 1 a day), Disp: 180 Tab, Rfl: 0    diclofenac EC (VOLTAREN) 50 mg EC tablet, Take 1 Tab by mouth two (2) times a day., Disp: 30 Tab, Rfl: 0    MULTIVITAMIN PO, Take  by mouth daily. , Disp: , Rfl:     omeprazole (PRILOSEC) 20 mg capsule, Not a current medication, Disp: , Rfl: 0    fluticasone propionate (FLONASE) 50 mcg/actuation nasal spray, 2 sprays per nostril per day  Indications: inflammation of the nose due to an allergy (Patient taking differently: 2 sprays per nostril per day PRN  Indications: inflammation of the nose due to an allergy), Disp: 1 Bottle, Rfl: 3    levothyroxine (SYNTHROID) 88 mcg tablet, Take 1 Tab by mouth Daily (before breakfast). , Disp: 90 Tab, Rfl: 4    naproxen sodium 220 mg cap, Take 220 Caps by mouth daily as needed. 2 a day, Disp: , Rfl:     aspirin 81 mg chewable tablet, Take 1 Tab by mouth daily. , Disp: 30 Tab, Rfl: 11    Wheat Dextrin (Benefiber Clear) 3 gram/3.5 gram pwpk, Take 1 Each by mouth nightly., Disp: 28 Packet, Rfl: 1    Past Medical History:   Diagnosis Date    CAD (coronary artery disease) 10/2013    Colon polyps     Fatty liver     GERD (gastroesophageal reflux disease)     Graves disease     hypothyroid now since procedure    H. pylori infection     Headache     sometimes migraines    Hypertension     Hypokalemia     Menopause     Nausea & vomiting     Pneumothorax ~    Sleep apnea     cpap compliant    Vitamin D deficiency        Past Surgical History:   Procedure Laterality Date    HX BILATERAL SALPINGO-OOPHORECTOMY      HX CHOLECYSTECTOMY  2019    Lap debo w/cholangiogram    HX COLONOSCOPY      HX ENDOSCOPY      HX HEENT      Radioactive thyroid    HX HYSTERECTOMY      PARTIAL    [place] CARDIAC SURG PROCEDURE UNLIST  10-13    CARDIAC CATH       Social History     Tobacco Use   Smoking Status Former Smoker    Packs/day: 1.00    Years: 10.00    Pack years: 10.00    Quit date: 2022    Years since quittin.0   Smokeless Tobacco Never Used       Social History     Socioeconomic History    Marital status: SINGLE   Tobacco Use    Smoking status: Former Smoker     Packs/day: 1.00     Years: 10.00     Pack years: 10.00     Quit date: 2022     Years since quittin.0    Smokeless tobacco: Never Used   Vaping Use    Vaping Use: Never used   Substance and Sexual Activity    Alcohol use: Yes     Alcohol/week: 0.0 standard drinks     Comment: some weekends one or two beers    Drug use: No       Family History   Problem Relation Age of Onset    Kidney Disease Mother     Hypertension Mother     Diabetes Mother     Heart Disease Mother     Cancer Father         Brain    Heart Disease Brother     Hypertension Brother     Diabetes Maternal Grandmother     Cancer Maternal Grandmother     Heart Disease Maternal Grandmother     Hypertension Brother     Hypertension Sister     Diabetes Sister     Hypertension Sister     Hypertension Sister     Glaucoma Brother          Objective:     Visit Vitals  /70 (BP 1 Location: Right upper arm, BP Patient Position: Sitting, BP Cuff Size: Large adult)   Pulse 96   Temp 97.4 °F (36.3 °C) (Temporal)   Resp 14   Ht 5' 6\" (1.676 m)   Wt 211 lb (95.7 kg)   SpO2 99%   BMI 34.06 kg/m²     Body mass index is 34.06 kg/m². General: Alert and oriented. Appears uncomfortable. Well nourished  HEENT :  Ears:TMs are normal. Canals are clear. Eyes: pupils equal, round, react to light and accommodation. Extra ocular movements intact. Nose: patent. Mouth and throat is clear. MASK  Neck:palpable spasm  decreased ROM  Limited to 30 degrees, no thyromegaly. Trachea midline, No carotid bruits. No significant lymphadenopathy  Lungs[de-identified] clear to auscultation without wheezes, rales, or rhonchi. Heart :RRR, S1 & S2 are normal intensity. No murmur; no gallop  Abdomen: bowel sounds active. No tenderness, guarding, rebound, masses, hepatic or spleen enlargement  Back: no CVA tenderness.   Extremities: without clubbing, cyanosis, or edema  Pulses: radial and femoral pulses are normal  Neuro: HMF intact. Cranial nerves II through XII grossly normal.  Motor: is 5 over 5 and symmetrical.   Deep tendon reflexes: +2 equal  Psych:appropriate behavior, mood, affect and judgement. Results for orders placed or performed in visit on 46/79/21   METABOLIC PANEL, COMPREHENSIVE   Result Value Ref Range    Sodium 142 136 - 145 mmol/L    Potassium 3.4 (L) 3.5 - 5.1 mmol/L    Chloride 115 (H) 97 - 108 mmol/L    CO2 21 21 - 32 mmol/L    Anion gap 6 5 - 15 mmol/L    Glucose 108 (H) 65 - 100 mg/dL    BUN 24 (H) 6 - 20 MG/DL    Creatinine 0.85 0.55 - 1.02 MG/DL    BUN/Creatinine ratio 28 (H) 12 - 20      GFR est AA >60 >60 ml/min/1.73m2    GFR est non-AA >60 >60 ml/min/1.73m2    Calcium 9.2 8.5 - 10.1 MG/DL    Bilirubin, total 0.2 0.2 - 1.0 MG/DL    ALT (SGPT) 28 12 - 78 U/L    AST (SGOT) 15 15 - 37 U/L    Alk. phosphatase 110 45 - 117 U/L    Protein, total 6.8 6.4 - 8.2 g/dL    Albumin 3.8 3.5 - 5.0 g/dL    Globulin 3.0 2.0 - 4.0 g/dL    A-G Ratio 1.3 1.1 - 2.2     CBC WITH AUTOMATED DIFF   Result Value Ref Range    WBC 8.2 3.6 - 11.0 K/uL    RBC 3.97 3.80 - 5.20 M/uL    HGB 12.5 11.5 - 16.0 g/dL    HCT 38.6 35.0 - 47.0 %    MCV 97.2 80.0 - 99.0 FL    MCH 31.5 26.0 - 34.0 PG    MCHC 32.4 30.0 - 36.5 g/dL    RDW 14.5 11.5 - 14.5 %    PLATELET 606 309 - 312 K/uL    MPV 10.8 8.9 - 12.9 FL    NRBC 0.0 0  WBC    ABSOLUTE NRBC 0.00 0.00 - 0.01 K/uL    NEUTROPHILS 58 32 - 75 %    LYMPHOCYTES 31 12 - 49 %    MONOCYTES 8 5 - 13 %    EOSINOPHILS 2 0 - 7 %    BASOPHILS 1 0 - 1 %    IMMATURE GRANULOCYTES 0 0.0 - 0.5 %    ABS. NEUTROPHILS 4.8 1.8 - 8.0 K/UL    ABS. LYMPHOCYTES 2.6 0.8 - 3.5 K/UL    ABS. MONOCYTES 0.7 0.0 - 1.0 K/UL    ABS. EOSINOPHILS 0.1 0.0 - 0.4 K/UL    ABS. BASOPHILS 0.1 0.0 - 0.1 K/UL    ABS. IMM. GRANS. 0.0 0.00 - 0.04 K/UL    DF AUTOMATED         No results found for this visit on 02/03/22. Assessment/ Plan:     1. Muscle spasms of neck   TRIAMCINOLONE ACETONIDE INJ   triamcinolone acetonide (Kenalog) 40 mg/mL injection; 1 mL by IntraMUSCular route once for 1 dose. Dispense: 1 mL; Refill: 0   cyclobenzaprine (FLEXERIL) 10 mg tablet; Take 1 Tablet by mouth nightly. Indications: muscle spasm  Dispense: 10 Tablet; Refill: 0      Orders Placed This Encounter    TRIAMCINOLONE ACETONIDE INJ     Order Specific Question:   Charge Quantity? Answer:   4     Order Specific Question:   Dose     Answer:   10 mg     Order Specific Question:   Site     Answer:   LEFT DELTOID     Order Specific Question:   Expiration Date     Answer:   3/31/2023     Order Specific Question:   Lot#     Answer:   [id number]     Order Specific Question:        Answer:   [facility]     Order Specific Question:   Perfomed by/Witnessed by: Answer:   Trip Xavier     Order Specific Question:   NDC#     Answer:   41179-486-68 [[id number]]    triamcinolone acetonide (Kenalog) 40 mg/mL injection     Si mL by IntraMUSCular route once for 1 dose. Dispense:  1 mL     Refill:  0    cyclobenzaprine (FLEXERIL) 10 mg tablet     Sig: Take 1 Tablet by mouth nightly. Indications: muscle spasm     Dispense:  10 Tablet     Refill:  0       Patient will follow up as needed. Verbal and written instructions (see AVS) provided. Patient expresses understanding of diagnosis and treatment plan.     Health Maintenance Due   Topic Date Due    COVID-19 Vaccine (3 - Booster for Moderna series) 2021    A1C test (Diabetic or Prediabetic)  10/21/2021    Lipid Screen  10/21/2021               SPENSER [facility]igil

## 2022-02-03 NOTE — TELEPHONE ENCOUNTER
----- Message from Salma Irizarry sent at 2/3/2022  7:10 AM EST -----  Subject: Message to Provider    QUESTIONS  Information for Provider? Patient was seen on 2/1 for a pinched nerve and   the medication prescribed is not working and wondering if their was   anything else that can be called in. She as well said she could come in   again if she needed to.   ---------------------------------------------------------------------------  --------------  CALL BACK INFO  What is the best way for the office to contact you? OK to leave message on   voicemail  Preferred Call Back Phone Number? [phone]  ---------------------------------------------------------------------------  --------------  SCRIPT ANSWERS  Relationship to Patient?  Self

## 2022-03-18 PROBLEM — K82.8 BILIARY DYSKINESIA: Status: ACTIVE | Noted: 2019-11-03

## 2022-03-19 PROBLEM — E66.01 SEVERE OBESITY (HCC): Status: ACTIVE | Noted: 2018-12-27

## 2022-03-28 ENCOUNTER — VIRTUAL VISIT (OUTPATIENT)
Dept: FAMILY MEDICINE CLINIC | Age: 52
End: 2022-03-28
Payer: COMMERCIAL

## 2022-03-28 DIAGNOSIS — J01.10 ACUTE NON-RECURRENT FRONTAL SINUSITIS: Primary | ICD-10-CM

## 2022-03-28 DIAGNOSIS — H66.001 NON-RECURRENT ACUTE SUPPURATIVE OTITIS MEDIA OF RIGHT EAR WITHOUT SPONTANEOUS RUPTURE OF TYMPANIC MEMBRANE: ICD-10-CM

## 2022-03-28 PROBLEM — G47.30 SLEEP APNEA: Status: ACTIVE | Noted: 2017-09-06

## 2022-03-28 PROCEDURE — 99443 PR PHYS/QHP TELEPHONE EVALUATION 21-30 MIN: CPT | Performed by: NURSE PRACTITIONER

## 2022-03-28 RX ORDER — AMOXICILLIN AND CLAVULANATE POTASSIUM 500; 125 MG/1; MG/1
1 TABLET, FILM COATED ORAL EVERY 12 HOURS
Qty: 20 TABLET | Refills: 0 | Status: SHIPPED | OUTPATIENT
Start: 2022-03-28 | End: 2022-04-07

## 2022-03-28 RX ORDER — PREDNISONE 20 MG/1
40 TABLET ORAL DAILY
COMMUNITY
Start: 2022-03-26 | End: 2022-03-31

## 2022-03-28 NOTE — PROGRESS NOTES
Chief Complaint   Patient presents with    Sinus Infection     [facility]     1. Have you been to the ER, urgent care clinic since your last visit? Hospitalized since your last visit? Yes [facility] Saturday  2. Have you seen or consulted any other health care providers outside of the [address] [place] [address] since your last visit? Include any pap smears or colon screening. Abuse Screening Questionnaire 3/28/2022   Do you ever feel afraid of your partner? N   Are you in a relationship with someone who physically or mentally threatens you? N   Is it safe for you to go home?  Y   No  3 most recent PHQ Screens 3/28/2022   PHQ Not Done -   Little interest or pleasure in doing things Not at all   Feeling down, depressed, irritable, or hopeless Not at all   Total Score PHQ 2 0     Learning Assessment 10/12/2021   PRIMARY LEARNER Patient   HIGHEST LEVEL OF EDUCATION - PRIMARY LEARNER  GRADUATED HIGH SCHOOL OR GED   BARRIERS PRIMARY LEARNER NONE   CO-LEARNER CAREGIVER No   PRIMARY LANGUAGE ENGLISH   LEARNER PREFERENCE PRIMARY READING   ANSWERED BY Patient   RELATIONSHIP SELF

## 2022-03-28 NOTE — PROGRESS NOTES
Delaney Paul is a 49 y.o. female, evaluated via audio-only technology on 3/28/2022 for Sinus Infection (f/u [facility] Saturday) and Ear Pain  . Ms. Khoi Arevalo endorses  Having a migraine HA on Friday . She took her Fioricet and went back to bed. She continued to feel ill on Saturday and took a COVID test which was negative. She went to [facility] and was evaluated and treated for sinus congestion and fluid in the right ear. She endorses being treated with Toradol and Prednisone . At present she endorses  her right ear and behind the ear still hurt. We discussed coming by the office so I can look in her ears and treat accordingly. Facial pressure has resolved. Assessment & Plan:   Diagnoses and all orders for this visit:    1. Acute non-recurrent frontal sinusitis    2. Non-recurrent acute suppurative otitis media of right ear without spontaneous rupture of tympanic membrane    Other orders  -     amoxicillin-clavulanate (AUGMENTIN) 500-125 mg per tablet; Take 1 Tablet by mouth every twelve (12) hours for 10 days. The complexity of medical decision making for this visit is moderate         12  Subjective:       Prior to Admission medications    Medication Sig Start Date End Date Taking? Authorizing Provider   predniSONE (DELTASONE) 20 mg tablet Take 40 mg by mouth daily. 3/26/22 3/31/22 Yes Provider, Historical   cetirizine (ZYRTEC) 10 mg tablet TAKE 1 TABLET BY MOUTH AT BEDTIME AS NEEDED FOR ALLERGIES 2/14/22  Yes Stas Sams NP   cyclobenzaprine (FLEXERIL) 10 mg tablet Take 1 Tablet by mouth nightly. Indications: muscle spasm 2/3/22  Yes Stas Sams NP   topiramate (TOPAMAX) 25 mg tablet Take 1 tablet by mouth twice daily 12/27/21  Yes Stas Sams NP   benazepriL (LOTENSIN) 40 mg tablet Take 1 Tablet by mouth daily. 10/12/21  Yes Stas Sams NP   spironolactone (ALDACTONE) 25 mg tablet Take 1 Tablet by mouth daily.  Indications: hypokalemia prevention 10/12/21  Yes Stas Sams NP pravastatin (PRAVACHOL) 10 mg tablet Take 1 Tablet by mouth daily. 10/12/21  Yes Marybeth Hassan NP   amLODIPine (NORVASC) 10 mg tablet Take 1 Tablet by mouth daily. 10/12/21  Yes Marybeth Hassan NP   potassium chloride (K-DUR, KLOR-CON) 20 mEq tablet Take 1 Tablet by mouth daily. Indications: low amount of potassium in the blood 10/12/21  Yes Marybeth Hassan NP   butalbital-acetaminophen-caffeine (FIORICET, ESGIC) -40 mg per tablet Take 1 Tablet by mouth every six (6) hours as needed for Headache. Indications: a migraine headache 10/12/21  Yes Marybeth Hassan NP   cholecalciferol, vitamin D3, (VITAMIN D3 PO) Take  by mouth. 125 mcg 5000 IU 1 A DAY    Yes Provider, Historical   pantoprazole (PROTONIX) 40 mg tablet PRN 4/2/21  Yes Provider, Historical   ondansetron (Zofran ODT) 4 mg disintegrating tablet Take 1 Tab by mouth every eight (8) hours as needed for Nausea. 4/17/21  Yes Pablo Javier MD   ondansetron (Zofran ODT) 4 mg disintegrating tablet 1 Tab by SubLINGual route every eight (8) hours as needed for Nausea or Vomiting. 4/17/21  Yes Chepe Xavier MD   triamcinolone acetonide (KENALOG) 0.1 % topical cream Apply  to affected area two (2) times a day. use thin layer 7/31/20  Yes Nathanael Alfredo NP   Wheat Dextrin (Benefiber Clear) 3 gram/3.5 gram pwpk Take 1 Each by mouth nightly. 4/20/20  Yes Marybeth Hassan NP   metFORMIN ER (GLUCOPHAGE XR) 500 mg tablet Take 1 tablet by mouth twice daily  Patient taking differently: 1 a day 3/6/20  Yes Marybeth Hassan NP   diclofenac EC (VOLTAREN) 50 mg EC tablet Take 1 Tab by mouth two (2) times a day. 3/2/20  Yes Marybeth Hassan NP   MULTIVITAMIN PO Take  by mouth daily.    Yes Provider, Historical   omeprazole (PRILOSEC) 20 mg capsule Not a current medication 10/22/19  Yes Provider, Historical   fluticasone propionate (FLONASE) 50 mcg/actuation nasal spray 2 sprays per nostril per day  Indications: inflammation of the nose due to an allergy  Patient taking differently: 2 sprays per nostril per day PRN  Indications: inflammation of the nose due to an allergy 4/1/19  Yes Naman Nguyen NP   levothyroxine (SYNTHROID) 88 mcg tablet Take 1 Tab by mouth Daily (before breakfast). 2/18/19  Yes Naman Nguyen NP   naproxen sodium 220 mg cap Take 220 Caps by mouth daily as needed. 2 a day   Yes Provider, Historical   aspirin 81 mg chewable tablet Take 1 Tab by mouth daily. 10/14/13  Yes Gissell Bravo MD   nicotine (NICODERM CQ) 14 mg/24 hr patch APPLY 1 PATCH TOPICALLY EVERY 24 HOURS FOR 30 DAYS  Patient not taking: Reported on 3/28/2022 1/24/22   Naman Nguyen NP     Patient Active Problem List   Diagnosis Code    Hypertension I10    Vitamin D deficiency E55.9    Hypokalemia E87.6    Migraine headache G43.909    Chronic coronary artery disease I25.10    Pelvic mass R19.00    Abnormal vaginal bleeding N93.9    Hypothyroidism E03.9    Vertigo R42    Pre-diabetes R73.03    Severe obesity ([address] E66.01    Graves disease E05.00    Biliary dyskinesia K82.8    Back pain M54.9    Benign hypertensive heart disease I11.9    Overweight E66.3    Sinusitis J32.9    Sleep apnea G47.30     Patient Active Problem List    Diagnosis Date Noted    Biliary dyskinesia 11/03/2019    Graves disease     Severe obesity ([address] 12/27/2018    Sleep apnea 09/06/2017    Back pain 09/08/2016    Pre-diabetes 05/20/2016    Hypothyroidism 04/15/2015    Vertigo 03/17/2014    Pelvic mass 01/07/2014    Abnormal vaginal bleeding 01/07/2014    Migraine headache 10/08/2013    Chronic coronary artery disease 10/01/2013    Hypertension     Vitamin D deficiency     Hypokalemia     Sinusitis 11/04/2011    Overweight 10/17/2011    Benign hypertensive heart disease 09/22/2008     Current Outpatient Medications   Medication Sig Dispense Refill    predniSONE (DELTASONE) 20 mg tablet Take 40 mg by mouth daily.       cetirizine (ZYRTEC) 10 mg tablet TAKE 1 TABLET BY MOUTH AT BEDTIME AS NEEDED FOR ALLERGIES 30 Tablet 5    cyclobenzaprine (FLEXERIL) 10 mg tablet Take 1 Tablet by mouth nightly. Indications: muscle spasm 10 Tablet 0    topiramate (TOPAMAX) 25 mg tablet Take 1 tablet by mouth twice daily 90 Tablet 1    benazepriL (LOTENSIN) 40 mg tablet Take 1 Tablet by mouth daily. 90 Tablet 0    spironolactone (ALDACTONE) 25 mg tablet Take 1 Tablet by mouth daily. Indications: hypokalemia prevention 90 Tablet 1    pravastatin (PRAVACHOL) 10 mg tablet Take 1 Tablet by mouth daily. 90 Tablet 1    amLODIPine (NORVASC) 10 mg tablet Take 1 Tablet by mouth daily. 90 Tablet 0    potassium chloride (K-DUR, KLOR-CON) 20 mEq tablet Take 1 Tablet by mouth daily. Indications: low amount of potassium in the blood 90 Tablet 1    butalbital-acetaminophen-caffeine (FIORICET, ESGIC) -40 mg per tablet Take 1 Tablet by mouth every six (6) hours as needed for Headache. Indications: a migraine headache 30 Tablet 1    cholecalciferol, vitamin D3, (VITAMIN D3 PO) Take  by mouth. 125 mcg 5000 IU 1 A DAY       pantoprazole (PROTONIX) 40 mg tablet PRN      ondansetron (Zofran ODT) 4 mg disintegrating tablet Take 1 Tab by mouth every eight (8) hours as needed for Nausea. 10 Tab 0    ondansetron (Zofran ODT) 4 mg disintegrating tablet 1 Tab by SubLINGual route every eight (8) hours as needed for Nausea or Vomiting. 15 Tab 0    triamcinolone acetonide (KENALOG) 0.1 % topical cream Apply  to affected area two (2) times a day. use thin layer 15 g 0    Wheat Dextrin (Benefiber Clear) 3 gram/3.5 gram pwpk Take 1 Each by mouth nightly. 28 Packet 1    metFORMIN ER (GLUCOPHAGE XR) 500 mg tablet Take 1 tablet by mouth twice daily (Patient taking differently: 1 a day) 180 Tab 0    diclofenac EC (VOLTAREN) 50 mg EC tablet Take 1 Tab by mouth two (2) times a day. 30 Tab 0    MULTIVITAMIN PO Take  by mouth daily.       omeprazole (PRILOSEC) 20 mg capsule Not a current medication  0    fluticasone propionate (FLONASE) 50 mcg/actuation nasal spray 2 sprays per nostril per day  Indications: inflammation of the nose due to an allergy (Patient taking differently: 2 sprays per nostril per day PRN  Indications: inflammation of the nose due to an allergy) 1 Bottle 3    levothyroxine (SYNTHROID) 88 mcg tablet Take 1 Tab by mouth Daily (before breakfast). 90 Tab 4    naproxen sodium 220 mg cap Take 220 Caps by mouth daily as needed. 2 a day      aspirin 81 mg chewable tablet Take 1 Tab by mouth daily.  30 Tab 11    nicotine (NICODERM CQ) 14 mg/24 hr patch APPLY 1 PATCH TOPICALLY EVERY 24 HOURS FOR 30 DAYS (Patient not taking: Reported on 3/28/2022) 30 Patch 0     Allergies   Allergen Reactions    Dilaudid [Hydromorphone (Bulk)] Unknown (comments)     Respiratory suppression leading to intubation     Past Medical History:   Diagnosis Date    CAD (coronary artery disease) 10/2013    Colon polyps     Fatty liver     GERD (gastroesophageal reflux disease)     Graves disease     hypothyroid now since procedure    H. pylori infection     Headache     sometimes migraines    Hypertension     Hypokalemia     Menopause     Nausea & vomiting     Pneumothorax ~2012    Sleep apnea     cpap compliant    Vitamin D deficiency      Past Surgical History:   Procedure Laterality Date    HX BILATERAL SALPINGO-OOPHORECTOMY      HX CHOLECYSTECTOMY  11/04/2019    Lap debo w/cholangiogram    HX COLONOSCOPY      HX ENDOSCOPY      HX HEENT      Radioactive thyroid    HX HYSTERECTOMY      PARTIAL    [place] CARDIAC SUR[facility] PROCEDURE UNLIST  10-13    CARDIAC CATH     Family History   Problem Relation Age of Onset    Kidney Disease Mother     Hypertension Mother     Diabetes Mother     Heart Disease Mother     Cancer Father         Brain    Heart Disease Brother     Hypertension Brother     Diabetes Maternal Grandmother     Cancer Maternal Grandmother     Heart Disease Maternal Grandmother     Hypertension Brother  Hypertension Sister     Diabetes Sister     Hypertension Sister     Hypertension Sister     Glaucoma Brother      Social History     Tobacco Use    Smoking status: Former Smoker     Packs/day: 1.00     Years: 10.00     Pack years: 10.00     Quit date: 2022     Years since quittin.1    Smokeless tobacco: Never Used   Substance Use Topics    Alcohol use: Yes     Alcohol/week: 0.0 standard drinks     Comment: some weekends one or two beers       ROS  Pertinent items are noted on the HPI. No data recorded     Greg Musa was evaluated through a patient-initiated, synchronous (real-time) audio only encounter. She (or guardian if applicable) is aware that it is a billable service, which includes applicable co-pays, with coverage as determined by her insurance carrier. This visit was conducted with the patient's (and/or Criselda Carrel guardian's) verbal consent. She has not had a related appointment within my department in the past 7 days or scheduled within the next 24 hours. The patient was located in a state where the provider was licensed to provide care.     Total Time: minutes: 21-30 minutes    Mary Grace Jenkins NP

## 2022-04-14 RX ORDER — BUTALBITAL, ACETAMINOPHEN AND CAFFEINE 50; 325; 40 MG/1; MG/1; MG/1
TABLET ORAL
Qty: 30 TABLET | Refills: 0 | Status: SHIPPED | OUTPATIENT
Start: 2022-04-14 | End: 2022-07-09

## 2022-05-09 DIAGNOSIS — E78.5 HYPERLIPIDEMIA, UNSPECIFIED HYPERLIPIDEMIA TYPE: ICD-10-CM

## 2022-05-09 RX ORDER — PRAVASTATIN SODIUM 10 MG/1
TABLET ORAL
Qty: 90 TABLET | Refills: 0 | Status: SHIPPED | OUTPATIENT
Start: 2022-05-09

## 2022-06-23 ENCOUNTER — OFFICE VISIT (OUTPATIENT)
Dept: FAMILY MEDICINE CLINIC | Age: 52
End: 2022-06-23
Payer: COMMERCIAL

## 2022-06-23 VITALS
HEIGHT: 66 IN | OXYGEN SATURATION: 99 % | RESPIRATION RATE: 16 BRPM | SYSTOLIC BLOOD PRESSURE: 112 MMHG | BODY MASS INDEX: 33.75 KG/M2 | TEMPERATURE: 96.8 F | WEIGHT: 210 LBS | HEART RATE: 84 BPM | DIASTOLIC BLOOD PRESSURE: 80 MMHG

## 2022-06-23 DIAGNOSIS — R53.83 FATIGUE, UNSPECIFIED TYPE: Primary | ICD-10-CM

## 2022-06-23 DIAGNOSIS — E87.6 HYPOKALEMIA: ICD-10-CM

## 2022-06-23 DIAGNOSIS — E03.9 ACQUIRED HYPOTHYROIDISM: ICD-10-CM

## 2022-06-23 DIAGNOSIS — E78.5 HYPERLIPIDEMIA, UNSPECIFIED HYPERLIPIDEMIA TYPE: ICD-10-CM

## 2022-06-23 DIAGNOSIS — R00.2 PALPITATIONS: ICD-10-CM

## 2022-06-23 DIAGNOSIS — R53.83 OTHER FATIGUE: ICD-10-CM

## 2022-06-23 DIAGNOSIS — R00.2 HEART PALPITATIONS: ICD-10-CM

## 2022-06-23 DIAGNOSIS — R73.03 PREDIABETES: ICD-10-CM

## 2022-06-23 PROCEDURE — 99214 OFFICE O/P EST MOD 30 MIN: CPT | Performed by: NURSE PRACTITIONER

## 2022-06-23 PROCEDURE — 93000 ELECTROCARDIOGRAM COMPLETE: CPT | Performed by: NURSE PRACTITIONER

## 2022-06-23 NOTE — PROGRESS NOTES
1. \"Have you been to the ER, urgent care clinic since your last visit? Hospitalized since your last visit? \" No    2. \"Have you seen or consulted any other health care providers outside of the [address] [place] [address] since your last visit? \" No     3. For patients aged 39-70: Has the patient had a colonoscopy / FIT/ Cologuard? Yes - no Care Gap present      If the patient is female:    4. For patients aged 41-77: Has the patient had a mammogram within the past 2 years? Yes - no Care Gap present      5. For patients aged 21-65: Has the patient had a pap smear?   NA had a full hysterectomy

## 2022-06-24 LAB
ALBUMIN SERPL-MCNC: 3.9 G/DL (ref 3.5–5)
ALBUMIN/GLOB SERPL: 1.2 {RATIO} (ref 1.1–2.2)
ALP SERPL-CCNC: 103 U/L (ref 45–117)
ALT SERPL-CCNC: 15 U/L (ref 12–78)
ANION GAP SERPL CALC-SCNC: 5 MMOL/L (ref 5–15)
ANION GAP SERPL CALC-SCNC: 6 MMOL/L (ref 5–15)
AST SERPL-CCNC: 12 U/L (ref 15–37)
BASOPHILS # BLD: 0.1 K/UL (ref 0–0.1)
BASOPHILS NFR BLD: 1 % (ref 0–1)
BILIRUB SERPL-MCNC: 0.2 MG/DL (ref 0.2–1)
BUN SERPL-MCNC: 22 MG/DL (ref 6–20)
BUN/CREAT SERPL: 23 (ref 12–20)
CALCIUM SERPL-MCNC: 9.4 MG/DL (ref 8.5–10.1)
CHLORIDE SERPL-SCNC: 115 MMOL/L (ref 97–108)
CHLORIDE SERPL-SCNC: 116 MMOL/L (ref 97–108)
CHOLEST SERPL-MCNC: 205 MG/DL
CO2 SERPL-SCNC: 22 MMOL/L (ref 21–32)
CO2 SERPL-SCNC: 24 MMOL/L (ref 21–32)
CREAT SERPL-MCNC: 0.96 MG/DL (ref 0.55–1.02)
DIFFERENTIAL METHOD BLD: ABNORMAL
EOSINOPHIL # BLD: 0.1 K/UL (ref 0–0.4)
EOSINOPHIL NFR BLD: 1 % (ref 0–7)
ERYTHROCYTE [DISTWIDTH] IN BLOOD BY AUTOMATED COUNT: 14.8 % (ref 11.5–14.5)
EST. AVERAGE GLUCOSE BLD GHB EST-MCNC: 111 MG/DL
GLOBULIN SER CALC-MCNC: 3.2 G/DL (ref 2–4)
GLUCOSE SERPL-MCNC: 87 MG/DL (ref 65–100)
HBA1C MFR BLD: 5.5 % (ref 4–5.6)
HCT VFR BLD AUTO: 42 % (ref 35–47)
HDLC SERPL-MCNC: 62 MG/DL
HDLC SERPL: 3.3 {RATIO} (ref 0–5)
HGB BLD-MCNC: 13.7 G/DL (ref 11.5–16)
IMM GRANULOCYTES # BLD AUTO: 0 K/UL (ref 0–0.04)
IMM GRANULOCYTES NFR BLD AUTO: 0 % (ref 0–0.5)
LDLC SERPL CALC-MCNC: 121 MG/DL (ref 0–100)
LYMPHOCYTES # BLD: 2.5 K/UL (ref 0.8–3.5)
LYMPHOCYTES NFR BLD: 31 % (ref 12–49)
MCH RBC QN AUTO: 32.3 PG (ref 26–34)
MCHC RBC AUTO-ENTMCNC: 32.6 G/DL (ref 30–36.5)
MCV RBC AUTO: 99.1 FL (ref 80–99)
MONOCYTES # BLD: 0.6 K/UL (ref 0–1)
MONOCYTES NFR BLD: 7 % (ref 5–13)
NEUTS SEG # BLD: 4.8 K/UL (ref 1.8–8)
NEUTS SEG NFR BLD: 60 % (ref 32–75)
NRBC # BLD: 0 K/UL (ref 0–0.01)
NRBC BLD-RTO: 0 PER 100 WBC
PLATELET # BLD AUTO: 273 K/UL (ref 150–400)
PMV BLD AUTO: 11.2 FL (ref 8.9–12.9)
POTASSIUM SERPL-SCNC: 3.6 MMOL/L (ref 3.5–5.1)
POTASSIUM SERPL-SCNC: 3.6 MMOL/L (ref 3.5–5.1)
PROT SERPL-MCNC: 7.1 G/DL (ref 6.4–8.2)
RBC # BLD AUTO: 4.24 M/UL (ref 3.8–5.2)
SODIUM SERPL-SCNC: 144 MMOL/L (ref 136–145)
SODIUM SERPL-SCNC: 144 MMOL/L (ref 136–145)
TRIGL SERPL-MCNC: 110 MG/DL (ref ?–150)
TSH SERPL DL<=0.05 MIU/L-ACNC: 0.74 UIU/ML (ref 0.36–3.74)
VLDLC SERPL CALC-MCNC: 22 MG/DL
WBC # BLD AUTO: 8 K/UL (ref 3.6–11)

## 2022-06-24 RX ORDER — TOPIRAMATE 25 MG/1
TABLET ORAL
Qty: 90 TABLET | Refills: 0 | Status: SHIPPED | OUTPATIENT
Start: 2022-06-24 | End: 2022-09-12

## 2022-06-24 NOTE — PROGRESS NOTES
Thyroid level is within normal limits   Fatigue may be coming from mild electrolyte imbalance recommend increasing fluids and adding a once a day propel or Gatorade. Watch sodium content . Not too high. Metabolic panel improving  liver and kidney functions   Lipid panel essentially the same .   HGBa1C 5.5 excellent

## 2022-06-25 RX ORDER — BENAZEPRIL HYDROCHLORIDE 40 MG/1
TABLET ORAL
Qty: 90 TABLET | Refills: 0 | Status: SHIPPED | OUTPATIENT
Start: 2022-06-25

## 2022-06-25 NOTE — PROGRESS NOTES
Subjective:     Italia Carmona is a 49 y.o. female who presents today with the following:  Chief Complaint   Patient presents with    Fatigue       Patient Active Problem List   Diagnosis Code    Hypertension I10    Vitamin D deficiency E55.9    Hypokalemia E87.6    Migraine headache G43.909    Chronic coronary artery disease I25.10    Pelvic mass R19.00    Abnormal vaginal bleeding N93.9    Hypothyroidism E03.9    Vertigo R42    Pre-diabetes R73.03    Severe obesity (HCC) E66.01    Graves disease E05.00    Biliary dyskinesia K82.8    Back pain M54.9    Benign hypertensive heart disease I11.9    Overweight E66.3    Sinusitis J32.9    Sleep apnea G47.30         COMPLIANT WITH MEDICATION:   HTN; Denies chest pain, dyspnea, (+) palpitations, headache and blurred vision. Blood pressure normotensive. Taking    spironolactone (ALDACTONE) 25 mg tablet, Take 1 Tablet by mouth daily. Indications: hypokalemia prevention, Disp: 90 Tablet, Rfl: 1    amLODIPine (NORVASC) 10 mg tablet, Take 1 Tablet by mouth daily. , Disp: 90 Tablet, Rfl: 0    potassium chloride (K-DUR, KLOR-CON) 20 mEq tablet, Take 1 Tablet by mouth daily. Indications: low amount of potassium in the blood, Disp: 90 Tablet, Rfl: 1    Palpitations and fatigue; heart palpitations x 3 weeks intermittent. depression screening addressed not at risk    abuse screening addressed denies    learning assessment addressed reviewed nurses notes    fall risk addressed not at risk    HM: addressed discussed shingrix vaccine.      ROS:  Gen: denies fever, chills, fatigue, weight loss, weight gain  HEENT:denies blurry vision, nasal congestion, sore throat  Resp: denies dypsnea, cough, wheezing  CV: denies chest pain radiating to the jaws or arms, palpitations, lower extremity edema  Abd: denies nausea, vomiting, diarrhea, constipation  Neuro: denies numbness/tingling  Endo: denies polyuria, polydipsia, heat/cold intolerance  Heme: no lymphadenopathy    Allergies   Allergen Reactions    Dilaudid [Hydromorphone (Bulk)] Unknown (comments)     Respiratory suppression leading to intubation         Current Outpatient Medications:     pravastatin (PRAVACHOL) 10 mg tablet, Take 1 tablet by mouth once daily, Disp: 90 Tablet, Rfl: 0    butalbital-acetaminophen-caffeine (FIORICET, ESGIC) -40 mg per tablet, TAKE 1 TABLET BY MOUTH EVERY 6 HOURS AS NEEDED FOR HEADACHE, Disp: 30 Tablet, Rfl: 0    cetirizine (ZYRTEC) 10 mg tablet, TAKE 1 TABLET BY MOUTH AT BEDTIME AS NEEDED FOR ALLERGIES, Disp: 30 Tablet, Rfl: 5    cyclobenzaprine (FLEXERIL) 10 mg tablet, Take 1 Tablet by mouth nightly. Indications: muscle spasm, Disp: 10 Tablet, Rfl: 0    nicotine (NICODERM CQ) 14 mg/24 hr patch, APPLY 1 PATCH TOPICALLY EVERY 24 HOURS FOR 30 DAYS, Disp: 30 Patch, Rfl: 0    benazepriL (LOTENSIN) 40 mg tablet, Take 1 Tablet by mouth daily. , Disp: 90 Tablet, Rfl: 0    spironolactone (ALDACTONE) 25 mg tablet, Take 1 Tablet by mouth daily. Indications: hypokalemia prevention, Disp: 90 Tablet, Rfl: 1    amLODIPine (NORVASC) 10 mg tablet, Take 1 Tablet by mouth daily. , Disp: 90 Tablet, Rfl: 0    potassium chloride (K-DUR, KLOR-CON) 20 mEq tablet, Take 1 Tablet by mouth daily. Indications: low amount of potassium in the blood, Disp: 90 Tablet, Rfl: 1    cholecalciferol, vitamin D3, (VITAMIN D3 PO), Take  by mouth. 125 mcg 5000 IU 1 A DAY , Disp: , Rfl:     pantoprazole (PROTONIX) 40 mg tablet, PRN, Disp: , Rfl:     ondansetron (Zofran ODT) 4 mg disintegrating tablet, Take 1 Tab by mouth every eight (8) hours as needed for Nausea., Disp: 10 Tab, Rfl: 0    ondansetron (Zofran ODT) 4 mg disintegrating tablet, 1 Tab by SubLINGual route every eight (8) hours as needed for Nausea or Vomiting., Disp: 15 Tab, Rfl: 0    triamcinolone acetonide (KENALOG) 0.1 % topical cream, Apply  to affected area two (2) times a day.  use thin layer, Disp: 15 g, Rfl: 0    Wheat Dextrin (Benefiber Clear) 3 gram/3.5 gram pwpk, Take 1 Each by mouth nightly., Disp: 28 Packet, Rfl: 1    metFORMIN ER (GLUCOPHAGE XR) 500 mg tablet, Take 1 tablet by mouth twice daily (Patient taking differently: 1 a day), Disp: 180 Tab, Rfl: 0    diclofenac EC (VOLTAREN) 50 mg EC tablet, Take 1 Tab by mouth two (2) times a day., Disp: 30 Tab, Rfl: 0    MULTIVITAMIN PO, Take  by mouth daily. , Disp: , Rfl:     omeprazole (PRILOSEC) 20 mg capsule, Not a current medication, Disp: , Rfl: 0    fluticasone propionate (FLONASE) 50 mcg/actuation nasal spray, 2 sprays per nostril per day  Indications: inflammation of the nose due to an allergy (Patient taking differently: 2 sprays per nostril per day PRN  Indications: inflammation of the nose due to an allergy), Disp: 1 Bottle, Rfl: 3    levothyroxine (SYNTHROID) 88 mcg tablet, Take 1 Tab by mouth Daily (before breakfast). , Disp: 90 Tab, Rfl: 4    naproxen sodium 220 mg cap, Take 220 Caps by mouth daily as needed. 2 a day, Disp: , Rfl:     aspirin 81 mg chewable tablet, Take 1 Tab by mouth daily. , Disp: 30 Tab, Rfl: 11    topiramate (TOPAMAX) 25 mg tablet, Take 1 tablet by mouth twice daily, Disp: 90 Tablet, Rfl: 0    Past Medical History:   Diagnosis Date    CAD (coronary artery disease) 10/2013    Colon polyps     Fatty liver     GERD (gastroesophageal reflux disease)     Graves disease     hypothyroid now since procedure    H. pylori infection     Headache     sometimes migraines    Hypertension     Hypokalemia     Menopause     Nausea & vomiting     Pneumothorax ~2012    Sleep apnea     cpap compliant    Vitamin D deficiency        Past Surgical History:   Procedure Laterality Date    HX BILATERAL SALPINGO-OOPHORECTOMY      HX CHOLECYSTECTOMY  11/04/2019    Lap debo w/cholangiogram    HX COLONOSCOPY      HX ENDOSCOPY      HX HEENT      Radioactive thyroid    HX HYSTERECTOMY      PARTIAL    CT CARDIAC SURG PROCEDURE UNLIST  10-13    CARDIAC CATH Social History     Tobacco Use   Smoking Status Former Smoker    Packs/day: 1.00    Years: 10.00    Pack years: 10.00    Quit date: 2022    Years since quittin.4   Smokeless Tobacco Never Used       Social History     Socioeconomic History    Marital status: SINGLE   Tobacco Use    Smoking status: Former Smoker     Packs/day: 1.00     Years: 10.00     Pack years: 10.00     Quit date: 2022     Years since quittin.4    Smokeless tobacco: Never Used   Vaping Use    Vaping Use: Never used   Substance and Sexual Activity    Alcohol use: Yes     Alcohol/week: 0.0 standard drinks     Comment: some weekends one or two beers    Drug use: No     Social Determinants of Health     Financial Resource Strain: Medium Risk    Difficulty of Paying Living Expenses: Somewhat hard   Food Insecurity: No Food Insecurity    Worried About Running Out of Food in the Last Year: Never true    John of Food in the Last Year: Never true   Transportation Needs: No Transportation Needs    Lack of Transportation (Medical): No    Lack of Transportation (Non-Medical): No       Family History   Problem Relation Age of Onset    Kidney Disease Mother     Hypertension Mother     Diabetes Mother     Heart Disease Mother     Cancer Father         Brain    Heart Disease Brother     Hypertension Brother     Diabetes Maternal Grandmother     Cancer Maternal Grandmother     Heart Disease Maternal Grandmother     Hypertension Brother     Hypertension Sister     Diabetes Sister     Hypertension Sister     Hypertension Sister     Glaucoma Brother          Objective:     Visit Vitals  /80 (BP 1 Location: Right upper arm, BP Patient Position: Sitting, BP Cuff Size: Large adult)   Pulse 84   Temp 96.8 °F (36 °C) (Temporal)   Resp 16   Ht 5' 6\" (1.676 m)   Wt 210 lb (95.3 kg)   SpO2 99%   BMI 33.89 kg/m²     Body mass index is 33.89 kg/m². General: Alert and oriented. No acute distress.   Well nourished, obese   HEENT :  Ears:TMs are normal. Canals are clear. Eyes: pupils equal, round, react to light and accommodation. Extra ocular movements intact. Nose: patent. Mouth and throat is clear. Neck:supple full range of motion no thyromegaly. Trachea midline, No carotid bruits. No significant lymphadenopathy  Lungs[de-identified] clear to auscultation without wheezes, rales, or rhonchi. Heart :RRR, S1 & S2 are normal intensity. No murmur; no gallop  Abdomen: bowel sounds active. No tenderness, guarding, rebound, masses, hepatic or spleen enlargement  Back: no CVA tenderness. Extremities: without clubbing, cyanosis, or edema  Pulses: radial and femoral pulses are normal  Neuro: HMF intact. Cranial nerves II through XII grossly normal.  Motor: is 5 over 5 and symmetrical.   Deep tendon reflexes: +2 equal  Psych:appropriate behavior, mood, affect and judgement. EKG; NSR HR 74  Results for orders placed or performed in visit on 06/23/22   TSH 3RD GENERATION   Result Value Ref Range    TSH 0.74 0.36 - 3.74 uIU/mL   ELECTROLYTES   Result Value Ref Range    Sodium 144 136 - 145 mmol/L    Potassium 3.6 3.5 - 5.1 mmol/L    Chloride 116 (H) 97 - 108 mmol/L    CO2 22 21 - 32 mmol/L    Anion gap 6 5 - 15 mmol/L   METABOLIC PANEL, COMPREHENSIVE   Result Value Ref Range    Sodium 144 136 - 145 mmol/L    Potassium 3.6 3.5 - 5.1 mmol/L    Chloride 115 (H) 97 - 108 mmol/L    CO2 24 21 - 32 mmol/L    Anion gap 5 5 - 15 mmol/L    Glucose 87 65 - 100 mg/dL    BUN 22 (H) 6 - 20 MG/DL    Creatinine 0.96 0.55 - 1.02 MG/DL    BUN/Creatinine ratio 23 (H) 12 - 20      GFR est AA >60 >60 ml/min/1.73m2    GFR est non-AA >60 >60 ml/min/1.73m2    Calcium 9.4 8.5 - 10.1 MG/DL    Bilirubin, total 0.2 0.2 - 1.0 MG/DL    ALT (SGPT) 15 12 - 78 U/L    AST (SGOT) 12 (L) 15 - 37 U/L    Alk.  phosphatase 103 45 - 117 U/L    Protein, total 7.1 6.4 - 8.2 g/dL    Albumin 3.9 3.5 - 5.0 g/dL    Globulin 3.2 2.0 - 4.0 g/dL    A-G Ratio 1.2 1.1 - 2.2     LIPID PANEL   Result Value Ref Range    Cholesterol, total 205 (H) <200 MG/DL    Triglyceride 110 <150 MG/DL    HDL Cholesterol 62 MG/DL    LDL, calculated 121 (H) 0 - 100 MG/DL    VLDL, calculated 22 MG/DL    CHOL/HDL Ratio 3.3 0.0 - 5.0     HEMOGLOBIN A1C WITH EAG   Result Value Ref Range    Hemoglobin A1c 5.5 4.0 - 5.6 %    Est. average glucose 111 mg/dL   CBC WITH AUTOMATED DIFF   Result Value Ref Range    WBC 8.0 3.6 - 11.0 K/uL    RBC 4.24 3.80 - 5.20 M/uL    HGB 13.7 11.5 - 16.0 g/dL    HCT 42.0 35.0 - 47.0 %    MCV 99.1 (H) 80.0 - 99.0 FL    MCH 32.3 26.0 - 34.0 PG    MCHC 32.6 30.0 - 36.5 g/dL    RDW 14.8 (H) 11.5 - 14.5 %    PLATELET 070 763 - 238 K/uL    MPV 11.2 8.9 - 12.9 FL    NRBC 0.0 0  WBC    ABSOLUTE NRBC 0.00 0.00 - 0.01 K/uL    NEUTROPHILS 60 32 - 75 %    LYMPHOCYTES 31 12 - 49 %    MONOCYTES 7 5 - 13 %    EOSINOPHILS 1 0 - 7 %    BASOPHILS 1 0 - 1 %    IMMATURE GRANULOCYTES 0 0.0 - 0.5 %    ABS. NEUTROPHILS 4.8 1.8 - 8.0 K/UL    ABS. LYMPHOCYTES 2.5 0.8 - 3.5 K/UL    ABS. MONOCYTES 0.6 0.0 - 1.0 K/UL    ABS. EOSINOPHILS 0.1 0.0 - 0.4 K/UL    ABS. BASOPHILS 0.1 0.0 - 0.1 K/UL    ABS. IMM.  GRANS. 0.0 0.00 - 0.04 K/UL    DF AUTOMATED         Results for orders placed or performed in visit on 06/23/22   TSH 3RD GENERATION   Result Value Ref Range    TSH 0.74 0.36 - 3.74 uIU/mL   ELECTROLYTES   Result Value Ref Range    Sodium 144 136 - 145 mmol/L    Potassium 3.6 3.5 - 5.1 mmol/L    Chloride 116 (H) 97 - 108 mmol/L    CO2 22 21 - 32 mmol/L    Anion gap 6 5 - 15 mmol/L   METABOLIC PANEL, COMPREHENSIVE   Result Value Ref Range    Sodium 144 136 - 145 mmol/L    Potassium 3.6 3.5 - 5.1 mmol/L    Chloride 115 (H) 97 - 108 mmol/L    CO2 24 21 - 32 mmol/L    Anion gap 5 5 - 15 mmol/L    Glucose 87 65 - 100 mg/dL    BUN 22 (H) 6 - 20 MG/DL    Creatinine 0.96 0.55 - 1.02 MG/DL    BUN/Creatinine ratio 23 (H) 12 - 20      GFR est AA >60 >60 ml/min/1.73m2    GFR est non-AA >60 >60 ml/min/1.73m2    Calcium 9.4 8.5 - 10.1 MG/DL    Bilirubin, total 0.2 0.2 - 1.0 MG/DL    ALT (SGPT) 15 12 - 78 U/L    AST (SGOT) 12 (L) 15 - 37 U/L    Alk. phosphatase 103 45 - 117 U/L    Protein, total 7.1 6.4 - 8.2 g/dL    Albumin 3.9 3.5 - 5.0 g/dL    Globulin 3.2 2.0 - 4.0 g/dL    A-G Ratio 1.2 1.1 - 2.2     LIPID PANEL   Result Value Ref Range    Cholesterol, total 205 (H) <200 MG/DL    Triglyceride 110 <150 MG/DL    HDL Cholesterol 62 MG/DL    LDL, calculated 121 (H) 0 - 100 MG/DL    VLDL, calculated 22 MG/DL    CHOL/HDL Ratio 3.3 0.0 - 5.0     HEMOGLOBIN A1C WITH EAG   Result Value Ref Range    Hemoglobin A1c 5.5 4.0 - 5.6 %    Est. average glucose 111 mg/dL   CBC WITH AUTOMATED DIFF   Result Value Ref Range    WBC 8.0 3.6 - 11.0 K/uL    RBC 4.24 3.80 - 5.20 M/uL    HGB 13.7 11.5 - 16.0 g/dL    HCT 42.0 35.0 - 47.0 %    MCV 99.1 (H) 80.0 - 99.0 FL    MCH 32.3 26.0 - 34.0 PG    MCHC 32.6 30.0 - 36.5 g/dL    RDW 14.8 (H) 11.5 - 14.5 %    PLATELET 769 897 - 648 K/uL    MPV 11.2 8.9 - 12.9 FL    NRBC 0.0 0  WBC    ABSOLUTE NRBC 0.00 0.00 - 0.01 K/uL    NEUTROPHILS 60 32 - 75 %    LYMPHOCYTES 31 12 - 49 %    MONOCYTES 7 5 - 13 %    EOSINOPHILS 1 0 - 7 %    BASOPHILS 1 0 - 1 %    IMMATURE GRANULOCYTES 0 0.0 - 0.5 %    ABS. NEUTROPHILS 4.8 1.8 - 8.0 K/UL    ABS. LYMPHOCYTES 2.5 0.8 - 3.5 K/UL    ABS. MONOCYTES 0.6 0.0 - 1.0 K/UL    ABS. EOSINOPHILS 0.1 0.0 - 0.4 K/UL    ABS. BASOPHILS 0.1 0.0 - 0.1 K/UL    ABS. IMM. GRANS. 0.0 0.00 - 0.04 K/UL    DF AUTOMATED         Assessment/ Plan:     1. Fatigue, unspecified type    - AMB POC URINE, MICROALBUMIN, SEMIQUANT (3 RESULTS)  - CBC WITH AUTOMATED DIFF; Future  - HEMOGLOBIN A1C WITH EAG; Future  - LIPID PANEL; Future  - METABOLIC PANEL, COMPREHENSIVE; Future  - ELECTROLYTES; Future  - ELECTROLYTES  - METABOLIC PANEL, COMPREHENSIVE  - LIPID PANEL  - HEMOGLOBIN A1C WITH EAG  - CBC WITH AUTOMATED DIFF  - REFERRAL TO CARDIOLOGY    2.  Hyperlipidemia, unspecified hyperlipidemia type    Continue Pravachol 10 mg po q day. - LIPID PANEL; Future    - LIPID PANEL      3. Heart palpitations  Refer to cardiology  - AMB POC URINE, MICROALBUMIN, SEMIQUANT (3 RESULTS)  - CBC WITH AUTOMATED DIFF; Future  - HEMOGLOBIN A1C WITH EAG; Future  - LIPID PANEL; Future  Rer- METABOLIC PANEL, COMPREHENSIVE; Future  - ELECTROLYTES; Future  - AMB POC EKG ROUTINE W/ 12 LEADS, INTER & REP  - TSH 3RD GENERATION; Future  - TSH 3RD GENERATION  - ELECTROLYTES  - METABOLIC PANEL, COMPREHENSIVE  - LIPID PANEL  - HEMOGLOBIN A1C WITH EAG  - CBC WITH AUTOMATED DIFF    4. Prediabetes  -Diet control , exercise increased fluid. - AMB POC URINE, MICROALBUMIN, SEMIQUANT (3 RESULTS)  - CBC WITH AUTOMATED DIFF; Future  - HEMOGLOBIN A1C WITH EAG; Future  - LIPID PANEL; Future  - METABOLIC PANEL, COMPREHENSIVE; Future  - ELECTROLYTES; Future  - ELECTROLYTES  - METABOLIC PANEL, COMPREHENSIVE  - LIPID PANEL  - HEMOGLOBIN A1C WITH EAG  - CBC WITH AUTOMATED DIFF    5. Hypokalemia    - AMB POC URINE, MICROALBUMIN, SEMIQUANT (3 RESULTS)  - CBC WITH AUTOMATED DIFF; Future  - HEMOGLOBIN A1C WITH EAG; Future  - LIPID PANEL; Future  - METABOLIC PANEL, COMPREHENSIVE; Future  - ELECTROLYTES; Future  - ELECTROLYTES  - METABOLIC PANEL, COMPREHENSIVE  - LIPID PANEL  - HEMOGLOBIN A1C WITH EAG  - CBC WITH AUTOMATED DIFF    6. Other fatigue    - AMB POC URINE, MICROALBUMIN, SEMIQUANT (3 RESULTS)  - CBC WITH AUTOMATED DIFF; Future  - HEMOGLOBIN A1C WITH EAG; Future  - LIPID PANEL; Future  - METABOLIC PANEL, COMPREHENSIVE; Future  - ELECTROLYTES; Future  - ELECTROLYTES  - METABOLIC PANEL, COMPREHENSIVE  - LIPID PANEL  - HEMOGLOBIN A1C WITH EAG  - CBC WITH AUTOMATED DIFF    7. Acquired hypothyroidism  Continue levothyroxine 88 mcg tablet  - TSH 3RD GENERATION; Future  - TSH 3RD GENERATION    8. Palpitations  -referral to cardiology  - TSH 3RD GENERATION; Future  - TSH 3RD GENERATION  - REFERRAL TO CARDIOLOGY    9.  Hypertension   spironolactone (ALDACTONE) 25 mg tablet, Take 1 Tablet by mouth daily. Indications: hypokalemia prevention, Disp: 90 Tablet, Rfl: 1    amLODIPine (NORVASC) 10 mg tablet, Take 1 Tablet by mouth daily. , Disp: 90 Tablet, Rfl: 0    potassium chloride (K-DUR, KLOR-CON) 20 mEq tablet, Take 1 Tablet by mouth daily. Indications: low amount of potassium in the blood, Disp: 90 Tablet, Rfl: 1      Orders Placed This Encounter    CBC WITH AUTOMATED DIFF     Standing Status:   Future     Number of Occurrences:   1     Standing Expiration Date:   7/23/2022    HEMOGLOBIN A1C WITH EAG     Standing Status:   Future     Number of Occurrences:   1     Standing Expiration Date:   7/23/2022    LIPID PANEL     Standing Status:   Future     Number of Occurrences:   1     Standing Expiration Date:   3/72/9283    METABOLIC PANEL, COMPREHENSIVE     Standing Status:   Future     Number of Occurrences:   1     Standing Expiration Date:   7/23/2022    ELECTROLYTES     Standing Status:   Future     Number of Occurrences:   1     Standing Expiration Date:   6/23/2023    TSH 3RD GENERATION     Standing Status:   Future     Number of Occurrences:   1     Standing Expiration Date:   6/23/2023    REFERRAL TO CARDIOLOGY     Referral Priority:   Routine     Referral Type:   Consultation     Referral Reason:   Specialty Services Required     Referred to Provider:   Boo Valentine, PAUL     Number of Visits Requested:   1    AMB POC URINE, MICROALBUMIN, SEMIQUANT (3 RESULTS)    AMB POC EKG ROUTINE W/ 12 LEADS, INTER & REP     Order Specific Question:   Reason for Exam:     Answer:   heart palpitations x 3 weeks         Verbal and written instructions (see AVS) provided. Patient expresses understanding of diagnosis and treatment plan.     Health Maintenance Due   Topic Date Due    Shingrix Vaccine Age 49> (1 of 2) Never done               SPENSER Alegre

## 2022-06-25 NOTE — ACP (ADVANCE CARE PLANNING)
Has advanced medical directive scanned into chart. Reviewed at this visit. No changes.   Bruce SUAREZC

## 2022-06-26 ENCOUNTER — APPOINTMENT (OUTPATIENT)
Dept: GENERAL RADIOLOGY | Age: 52
End: 2022-06-26
Attending: EMERGENCY MEDICINE
Payer: COMMERCIAL

## 2022-06-26 ENCOUNTER — HOSPITAL ENCOUNTER (EMERGENCY)
Age: 52
Discharge: HOME | End: 2022-06-26
Attending: EMERGENCY MEDICINE
Payer: COMMERCIAL

## 2022-06-26 VITALS
WEIGHT: 205 LBS | HEIGHT: 64 IN | RESPIRATION RATE: 18 BRPM | SYSTOLIC BLOOD PRESSURE: 139 MMHG | HEART RATE: 74 BPM | DIASTOLIC BLOOD PRESSURE: 80 MMHG | TEMPERATURE: 99.9 F | OXYGEN SATURATION: 99 % | BODY MASS INDEX: 35 KG/M2

## 2022-06-26 DIAGNOSIS — M25.512 LEFT SHOULDER PAIN, UNSPECIFIED CHRONICITY: ICD-10-CM

## 2022-06-26 DIAGNOSIS — R07.9 CHEST PAIN, UNSPECIFIED TYPE: Primary | ICD-10-CM

## 2022-06-26 LAB
ALBUMIN SERPL-MCNC: 4 G/DL (ref 3.5–5)
ALBUMIN/GLOB SERPL: 1.2 {RATIO} (ref 1.1–2.2)
ALP SERPL-CCNC: 108 U/L (ref 45–117)
ALT SERPL-CCNC: 18 U/L (ref 12–78)
ANION GAP SERPL CALC-SCNC: 11 MMOL/L (ref 5–15)
AST SERPL-CCNC: 16 U/L (ref 15–37)
BASOPHILS # BLD: 0.1 K/UL (ref 0–0.1)
BASOPHILS NFR BLD: 1 % (ref 0–1)
BILIRUB SERPL-MCNC: 0.2 MG/DL (ref 0.2–1)
BUN SERPL-MCNC: 23 MG/DL (ref 6–20)
BUN/CREAT SERPL: 23 (ref 12–20)
CALCIUM SERPL-MCNC: 9.1 MG/DL (ref 8.5–10.1)
CHLORIDE SERPL-SCNC: 108 MMOL/L (ref 97–108)
CO2 SERPL-SCNC: 24 MMOL/L (ref 21–32)
CREAT SERPL-MCNC: 0.99 MG/DL (ref 0.55–1.02)
DIFFERENTIAL METHOD BLD: NORMAL
EOSINOPHIL # BLD: 0.1 K/UL (ref 0–0.4)
EOSINOPHIL NFR BLD: 1 % (ref 0–7)
ERYTHROCYTE [DISTWIDTH] IN BLOOD BY AUTOMATED COUNT: 14 % (ref 11.5–14.5)
FLUAV RNA SPEC QL NAA+PROBE: NOT DETECTED
FLUBV RNA SPEC QL NAA+PROBE: NOT DETECTED
GLOBULIN SER CALC-MCNC: 3.4 G/DL (ref 2–4)
GLUCOSE SERPL-MCNC: 120 MG/DL (ref 65–100)
HCT VFR BLD AUTO: 40.5 % (ref 35–47)
HGB BLD-MCNC: 13.9 G/DL (ref 11.5–16)
IMM GRANULOCYTES # BLD AUTO: 0 K/UL (ref 0–0.04)
IMM GRANULOCYTES NFR BLD AUTO: 0 % (ref 0–0.5)
LYMPHOCYTES # BLD: 2.3 K/UL (ref 0.8–3.5)
LYMPHOCYTES NFR BLD: 23 % (ref 12–49)
MAGNESIUM SERPL-MCNC: 2.3 MG/DL (ref 1.6–2.4)
MCH RBC QN AUTO: 31.7 PG (ref 26–34)
MCHC RBC AUTO-ENTMCNC: 34.3 G/DL (ref 30–36.5)
MCV RBC AUTO: 92.3 FL (ref 80–99)
MONOCYTES # BLD: 0.5 K/UL (ref 0–1)
MONOCYTES NFR BLD: 5 % (ref 5–13)
NEUTS SEG # BLD: 7.2 K/UL (ref 1.8–8)
NEUTS SEG NFR BLD: 70 % (ref 32–75)
NRBC # BLD: 0 K/UL (ref 0–0.01)
NRBC BLD-RTO: 0 PER 100 WBC
PLATELET # BLD AUTO: 275 K/UL (ref 150–400)
PLATELET COMMENTS,PCOM: NORMAL
PMV BLD AUTO: 10.5 FL (ref 8.9–12.9)
POTASSIUM SERPL-SCNC: 3.7 MMOL/L (ref 3.5–5.1)
PROT SERPL-MCNC: 7.4 G/DL (ref 6.4–8.2)
RBC # BLD AUTO: 4.39 M/UL (ref 3.8–5.2)
RBC MORPH BLD: NORMAL
SARS-COV-2, COV2: NOT DETECTED
SODIUM SERPL-SCNC: 143 MMOL/L (ref 136–145)
TROPONIN-HIGH SENSITIVITY: 5 NG/L (ref 0–51)
WBC # BLD AUTO: 10.2 K/UL (ref 3.6–11)

## 2022-06-26 PROCEDURE — 99285 EMERGENCY DEPT VISIT HI MDM: CPT

## 2022-06-26 PROCEDURE — 36415 COLL VENOUS BLD VENIPUNCTURE: CPT

## 2022-06-26 PROCEDURE — 83735 ASSAY OF MAGNESIUM: CPT

## 2022-06-26 PROCEDURE — 85025 COMPLETE CBC W/AUTO DIFF WBC: CPT

## 2022-06-26 PROCEDURE — 84484 ASSAY OF TROPONIN QUANT: CPT

## 2022-06-26 PROCEDURE — 93005 ELECTROCARDIOGRAM TRACING: CPT

## 2022-06-26 PROCEDURE — 80053 COMPREHEN METABOLIC PANEL: CPT

## 2022-06-26 PROCEDURE — 87636 SARSCOV2 & INF A&B AMP PRB: CPT

## 2022-06-26 PROCEDURE — 71046 X-RAY EXAM CHEST 2 VIEWS: CPT

## 2022-06-27 LAB
ATRIAL RATE: 67 BPM
CALCULATED P AXIS, ECG09: 50 DEGREES
CALCULATED R AXIS, ECG10: -6 DEGREES
CALCULATED T AXIS, ECG11: 46 DEGREES
DIAGNOSIS, 93000: NORMAL
P-R INTERVAL, ECG05: 152 MS
Q-T INTERVAL, ECG07: 408 MS
QRS DURATION, ECG06: 102 MS
QTC CALCULATION (BEZET), ECG08: 431 MS
VENTRICULAR RATE, ECG03: 67 BPM

## 2022-06-27 NOTE — ED PROVIDER NOTES
EMERGENCY DEPARTMENT HISTORY AND PHYSICAL EXAM      Date: 6/26/2022  Patient Name: Giovanni Lovett    History of Presenting Illness     Chief Complaint   Patient presents with    Numbness    Extremity Weakness       History Provided By: Patient    HPI: Giovanni Lovett, 49 y.o. female with PMHx as noted below presents the emergency department with chief complaint of chest pain. Patient states that approximately 1 to 2 hours prior to arrival had onset of a left-sided chest pain that she states radiated into the shoulder and had some shooting pain and tingling down to the arm. She notes the pain is been constant and moderate. She also reports some palpitations and fatigue that began several days ago. She reports being evaluated by her primary care physician for these complaints and had reassuring blood work. Had been scheduled for cardiac testing although uncertain as to which test she is scheduled for. Denies any recent long immobilization or lower extremity pain/swelling. Pt denies any other alleviating or exacerbating factors. Additionally, pt specifically denies any recent fever, chills, headache, nausea, vomiting, abdominal pain,SOB, lightheadedness, dizziness, numbness, weakness, BLE swelling,  urinary sxs, diarrhea, constipation, melena, hematochezia, cough, or congestion.     PCP: Alexey Bucio NP    Current Outpatient Medications   Medication Sig Dispense Refill    benazepriL (LOTENSIN) 40 mg tablet TAKE 1 TABLET BY MOUTH ONCE DAILY FOR HIGH BLOOD PRESSURE 90 Tablet 0    topiramate (TOPAMAX) 25 mg tablet Take 1 tablet by mouth twice daily 90 Tablet 0    pravastatin (PRAVACHOL) 10 mg tablet Take 1 tablet by mouth once daily 90 Tablet 0    butalbital-acetaminophen-caffeine (FIORICET, ESGIC) -40 mg per tablet TAKE 1 TABLET BY MOUTH EVERY 6 HOURS AS NEEDED FOR HEADACHE 30 Tablet 0    cetirizine (ZYRTEC) 10 mg tablet TAKE 1 TABLET BY MOUTH AT BEDTIME AS NEEDED FOR ALLERGIES 30 Tablet 5    cyclobenzaprine (FLEXERIL) 10 mg tablet Take 1 Tablet by mouth nightly. Indications: muscle spasm 10 Tablet 0    nicotine (NICODERM CQ) 14 mg/24 hr patch APPLY 1 PATCH TOPICALLY EVERY 24 HOURS FOR 30 DAYS 30 Patch 0    spironolactone (ALDACTONE) 25 mg tablet Take 1 Tablet by mouth daily. Indications: hypokalemia prevention 90 Tablet 1    amLODIPine (NORVASC) 10 mg tablet Take 1 Tablet by mouth daily. 90 Tablet 0    potassium chloride (K-DUR, KLOR-CON) 20 mEq tablet Take 1 Tablet by mouth daily. Indications: low amount of potassium in the blood 90 Tablet 1    cholecalciferol, vitamin D3, (VITAMIN D3 PO) Take  by mouth. 125 mcg 5000 IU 1 A DAY       pantoprazole (PROTONIX) 40 mg tablet PRN      ondansetron (Zofran ODT) 4 mg disintegrating tablet Take 1 Tab by mouth every eight (8) hours as needed for Nausea. 10 Tab 0    ondansetron (Zofran ODT) 4 mg disintegrating tablet 1 Tab by SubLINGual route every eight (8) hours as needed for Nausea or Vomiting. 15 Tab 0    triamcinolone acetonide (KENALOG) 0.1 % topical cream Apply  to affected area two (2) times a day. use thin layer 15 g 0    Wheat Dextrin (Benefiber Clear) 3 gram/3.5 gram pwpk Take 1 Each by mouth nightly. 28 Packet 1    metFORMIN ER (GLUCOPHAGE XR) 500 mg tablet Take 1 tablet by mouth twice daily (Patient taking differently: 1 a day) 180 Tab 0    diclofenac EC (VOLTAREN) 50 mg EC tablet Take 1 Tab by mouth two (2) times a day. 30 Tab 0    MULTIVITAMIN PO Take  by mouth daily.  omeprazole (PRILOSEC) 20 mg capsule Not a current medication  0    fluticasone propionate (FLONASE) 50 mcg/actuation nasal spray 2 sprays per nostril per day  Indications: inflammation of the nose due to an allergy (Patient taking differently: 2 sprays per nostril per day PRN  Indications: inflammation of the nose due to an allergy) 1 Bottle 3    levothyroxine (SYNTHROID) 88 mcg tablet Take 1 Tab by mouth Daily (before breakfast).  90 Tab 4    naproxen sodium 220 mg cap Take 220 Caps by mouth daily as needed. 2 a day      aspirin 81 mg chewable tablet Take 1 Tab by mouth daily. 27 Tab 11       Past History     Past Medical History:  Past Medical History:   Diagnosis Date    CAD (coronary artery disease) 10/2013    Colon polyps     Fatty liver     GERD (gastroesophageal reflux disease)     Graves disease     hypothyroid now since procedure    H. pylori infection     Headache     sometimes migraines    Hypertension     Hypokalemia     Menopause     Nausea & vomiting     Pneumothorax ~    Sleep apnea     cpap compliant    Vitamin D deficiency        Past Surgical History:  Past Surgical History:   Procedure Laterality Date    HX BILATERAL SALPINGO-OOPHORECTOMY      HX CHOLECYSTECTOMY  2019    Lap debo w/cholangiogram    HX COLONOSCOPY      HX ENDOSCOPY      HX HEENT      Radioactive thyroid    HX HYSTERECTOMY      PARTIAL    [place] CARDIAC SURG PROCEDURE UNLIST  10-    CARDIAC CATH       Family History:  Family History   Problem Relation Age of Onset    Kidney Disease Mother     Hypertension Mother     Diabetes Mother     Heart Disease Mother     Cancer Father         Brain    Heart Disease Brother     Hypertension Brother     Diabetes Maternal Grandmother     Cancer Maternal Grandmother     Heart Disease Maternal Grandmother     Hypertension Brother     Hypertension Sister     Diabetes Sister     Hypertension Sister     Hypertension Sister     Glaucoma Brother        Social History:  Social History     Tobacco Use    Smoking status: Former Smoker     Packs/day: 1.00     Years: 10.00     Pack years: 10.00     Quit date: 2022     Years since quittin.4    Smokeless tobacco: Never Used   Vaping Use    Vaping Use: Never used   Substance Use Topics    Alcohol use: Yes     Alcohol/week: 0.0 standard drinks     Comment: some weekends one or two beers    Drug use: No       Allergies:   Allergies   Allergen Reactions    Dilaudid [Hydromorphone (Bulk)] Unknown (comments)     Respiratory suppression leading to intubation         Review of Systems   Review of Systems  Constitutional: Negative for fever, chills. Positive fatigue. HENT: Negative for congestion, sore throat, rhinorrhea, sneezing and neck stiffness   Eyes: Negative for discharge and redness. Respiratory: Negative for  shortness of breath, wheezing   Cardiovascular: Positive for chest pain, palpitations   Gastrointestinal: Negative for nausea, vomiting, abdominal pain, constipation, diarrhea and blood in stool. Genitourinary: Negative for dysuria, hematuria, flank pain, decreased urine volume, discharge,   Musculoskeletal: Negative for myalgias or joint pain . Skin: Negative for rash or lesions . Neurological: Negative weakness, light-headedness, numbness and headaches. Physical Exam   Physical Exam    GENERAL: alert and oriented, no acute distress  EYES: PEERL, No injection, discharge or icterus. ENT: Mucous membranes pink and moist.  NECK: Supple  LUNGS: Airway patent. Non-labored respirations. Breath sounds clear with good air entry bilaterally. HEART: Regular rate and rhythm. No peripheral edema  ABDOMEN: Non-distended and non-tender, without guarding or rebound.   SKIN:  warm, dry  MSK/EXTREMITIES: Without swelling, tenderness or deformity, symmetric with normal ROM  NEUROLOGICAL: Alert, oriented, strength 5/5 bilateral upper extremities      Diagnostic Study Results     Labs -     Recent Results (from the past 12 hour(s))   CBC WITH AUTOMATED DIFF    Collection Time: 06/26/22  8:55 PM   Result Value Ref Range    WBC 10.2 3.6 - 11.0 K/uL    RBC 4.39 3.80 - 5.20 M/uL    HGB 13.9 11.5 - 16.0 g/dL    HCT 40.5 35.0 - 47.0 %    MCV 92.3 80.0 - 99.0 FL    MCH 31.7 26.0 - 34.0 PG    MCHC 34.3 30.0 - 36.5 g/dL    RDW 14.0 11.5 - 14.5 %    PLATELET 691 965 - 140 K/uL    MPV 10.5 8.9 - 12.9 FL    NRBC 0.0 0  WBC    ABSOLUTE NRBC 0.00 0.00 - 0.01 K/uL NEUTROPHILS 70 32 - 75 %    LYMPHOCYTES 23 12 - 49 %    MONOCYTES 5 5 - 13 %    EOSINOPHILS 1 0 - 7 %    BASOPHILS 1 0 - 1 %    IMMATURE GRANULOCYTES 0 0.0 - 0.5 %    ABS. NEUTROPHILS 7.2 1.8 - 8.0 K/UL    ABS. LYMPHOCYTES 2.3 0.8 - 3.5 K/UL    ABS. MONOCYTES 0.5 0.0 - 1.0 K/UL    ABS. EOSINOPHILS 0.1 0.0 - 0.4 K/UL    ABS. BASOPHILS 0.1 0.0 - 0.1 K/UL    ABS. IMM. GRANS. 0.0 0.00 - 0.04 K/UL    DF AUTOMATED      PLATELET COMMENTS ADEQUATE PLATELETS      RBC COMMENTS ANISOCYTOSIS  1+       METABOLIC PANEL, COMPREHENSIVE    Collection Time: 06/26/22  8:55 PM   Result Value Ref Range    Sodium 143 136 - 145 mmol/L    Potassium 3.7 3.5 - 5.1 mmol/L    Chloride 108 97 - 108 mmol/L    CO2 24 21 - 32 mmol/L    Anion gap 11 5 - 15 mmol/L    Glucose 120 (H) 65 - 100 mg/dL    BUN 23 (H) 6 - 20 MG/DL    Creatinine 0.99 0.55 - 1.02 MG/DL    BUN/Creatinine ratio 23 (H) 12 - 20      GFR est AA >60 >60 ml/min/1.73m2    GFR est non-AA 59 (L) >60 ml/min/1.73m2    Calcium 9.1 8.5 - 10.1 MG/DL    Bilirubin, total 0.2 0.2 - 1.0 MG/DL    ALT (SGPT) 18 12 - 78 U/L    AST (SGOT) 16 15 - 37 U/L    Alk.  phosphatase 108 45 - 117 U/L    Protein, total 7.4 6.4 - 8.2 g/dL    Albumin 4.0 3.5 - 5.0 g/dL    Globulin 3.4 2.0 - 4.0 g/dL    A-G Ratio 1.2 1.1 - 2.2     TROPONIN-HIGH SENSITIVITY    Collection Time: 06/26/22  8:55 PM   Result Value Ref Range    Troponin-High Sensitivity 5 0 - 51 ng/L   MAGNESIUM    Collection Time: 06/26/22  8:55 PM   Result Value Ref Range    Magnesium 2.3 1.6 - 2.4 mg/dL   COVID-19 WITH INFLUENZA A/B    Collection Time: 06/26/22  9:33 PM   Result Value Ref Range    SARS-CoV-2 by PCR Not detected NOTD      Influenza A by PCR Not detected NOTD      Influenza B by PCR Not detected NOTD     EKG, 12 LEAD, INITIAL    Collection Time: 06/26/22  9:45 PM   Result Value Ref Range    Ventricular Rate 67 BPM    Atrial Rate 67 BPM    P-R Interval 152 ms    QRS Duration 102 ms    Q-T Interval 408 ms    QTC Calculation (Bezet) 431 ms    Calculated P Axis 50 degrees    Calculated R Axis -6 degrees    Calculated T Axis 46 degrees    Diagnosis       Normal sinus rhythm  Normal ECG  When compared with ECG of 18-JUL-2014 03:22,  No significant change was found         Radiologic Studies -   XR CHEST PA LAT   Final Result   No acute cardiopulmonary disease. CT Results  (Last 48 hours)    None        CXR Results  (Last 48 hours)               06/26/22 2116  XR CHEST PA LAT Final result    Impression:  No acute cardiopulmonary disease. Narrative: Indication:  Chest Pain        Exam: PA and lateral views of the chest.       Direct comparison is made to prior CXR dated July 2014. Findings: Cardiomediastinal silhouette is within normal limits. Lungs are clear   bilaterally. Pleural spaces are normal. Osseous structures are intact. Medical Decision Making     I, Jewel Reynoso MD am the first provider for this patient and am the attending of record for this patient encounter. I reviewed the vital signs, available nursing notes, past medical history, past surgical history, family history and social history. Vital Signs-Reviewed the patient's vital signs. Patient Vitals for the past 12 hrs:   Temp Pulse Resp BP SpO2   06/26/22 2051 99.9 °F (37.7 °C) 74 18 139/80 99 %         EKG interpretation: (Preliminary)  Rhythm: normal sinus rhythm; and regular . Rate (approx.): 67; Axis: normal; P wave: normal; QRS interval: normal ; ST/T wave: normal;  was interpreted by Rosette Cross MD,ED Provider. Records Reviewed: Nursing Notes and Old Medical Records    Provider Notes (Medical Decision Making): On presentation the patient is well appearing, in no acute distress with reassurnig vital signs. Based on the history and exam the differential diagnosis for this patient includes  STEMI, NSTEMI, Angina, PE, Aortic Pathology, Chest Wall Pain, Pleurisy, Pneumonia, GERD/esophagitis, Anxiety.   Patient's vital signs and history are not indicative of a pulmonary embolism, additionally is low risk for Wells criteria. Patient's symptoms are also not suggestive of aortic dissection. Additionally she is having no weakness in the arm or numbness that would be suggestive of a CNS process. I have re-examined the patient and the patient denies any new or changing symptoms. The patient has had one negative troponin at this time and an EKG without any signs of acute ischemia. I explained this to the patient and that obtaining a second troponin at 2 hours is recommended to rule out ACS, however she is declining this testing and is asking to be discharged. The diagnosis, follow up, return instructions, test esults, x-rays and medications have been discussed and reviewed with the patient. The patient has been given the opportunity to ask questions. The patient expresses understanding of the diagnosis, follow-up and return instructions. The patient agrees to follow up with cardiology as directed and to return immediately if the chest pain worsens. The patient expresses understanding that although cardiac testing at this time is negative, a cardiac problem could still be present and that a follow-up appointment with a cardiologist for further evaluation and risk factor modification is necessary to complete the evaluation of this complaint. Samuel Nicole MD.      ED Course:   Initial assessment performed. The patients presenting problems have been discussed, and they are in agreement with the care plan formulated and outlined with them. I have encouraged them to ask questions as they arise throughout their visit.          PROGRESS    I informed the pt that she needed further workup for adequate evaluation for her chest pain  and that by refusing the above, she is at risk for myocardial infarction, arrythmia and further deterioration,  She is awake, alert, and she understands her condition and the risks involved in leaving. .    She is clinically aware of her surroundings and able to ask appropriate questions, the patients  nurse present confirms she is not clinically intoxicated and able to make medical decisions. She verbalized knowing the risks and understood it was recommended that she stay and could also return at any time. She was provided with warnings regarding worsening of her condition and were provided instructions to follow up with Dierdre Soulier, NP tomorrow or return to the Emergency Room as soon as possible. Jewel Reynoso MD      Disposition:  home    PLAN:  1. Discharge Medication List as of 6/26/2022 11:24 PM        2. Follow-up Information     Follow up With Specialties Details Why Contact Info    Dierdre Soulier, NP Nurse Practitioner Schedule an appointment as soon as possible for a visit in 2 days  [address] [facility] [address] [facility]      [address] [facility] [address] [facility]  If symptoms worsen [address] [address]  [phone]        Return to ED if worse     Diagnosis     Clinical Impression:   1. Chest pain, unspecified type    2. Left shoulder pain, unspecified chronicity        Please note that this dictation was completed with Dragon, computer voice recognition software. Quite often unanticipated grammatical, syntax, homophones, and other interpretive errors are inadvertently transcribed by the computer software. Please disregard these errors. Additionally, please excuse any errors that have escaped final proofreading.

## 2022-06-28 ENCOUNTER — OFFICE VISIT (OUTPATIENT)
Dept: FAMILY MEDICINE CLINIC | Age: 52
End: 2022-06-28
Payer: COMMERCIAL

## 2022-06-28 VITALS
HEIGHT: 64 IN | BODY MASS INDEX: 35.17 KG/M2 | RESPIRATION RATE: 18 BRPM | OXYGEN SATURATION: 98 % | HEART RATE: 76 BPM | SYSTOLIC BLOOD PRESSURE: 122 MMHG | TEMPERATURE: 97.9 F | WEIGHT: 206 LBS | DIASTOLIC BLOOD PRESSURE: 80 MMHG
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
## 2022-06-28 DIAGNOSIS — S60.862A INSECT BITE OF LEFT WRIST, INITIAL ENCOUNTER: Primary | ICD-10-CM

## 2022-06-28 PROCEDURE — 96372 THER/PROPH/DIAG INJ SC/IM: CPT | Performed by: NURSE PRACTITIONER

## 2022-06-28 PROCEDURE — 99214 OFFICE O/P EST MOD 30 MIN: CPT | Performed by: NURSE PRACTITIONER

## 2022-06-28 RX ORDER — TRIAMCINOLONE ACETONIDE 40 MG/ML
40 INJECTION, SUSPENSION INTRA-ARTICULAR; INTRAMUSCULAR ONCE
Status: COMPLETED | OUTPATIENT
Start: 2022-06-28 | End: 2022-06-28

## 2022-06-28 RX ADMIN — TRIAMCINOLONE ACETONIDE 40 MG: 40 INJECTION, SUSPENSION INTRA-ARTICULAR; INTRAMUSCULAR at 12:22

## 2022-06-28 NOTE — PROGRESS NOTES
Patient was administered triamsinolone 40 mg via IM left deltoid. Patient tolerated medication without side effects  Medication information reviewed with patient, patient states understanding. Patient to resume routine medications at home. Patient given copy of AVS with medication information and instructions for home. AVS reviewed with patient and patient states understanding. 1. \"Have you been to the ER, urgent care clinic since your last visit? Hospitalized since your last visit? \"  Yes [facility] ER 6- weakness and chest pain and urgent care 6- bursitis    2. \"Have you seen or consulted any other health care providers outside of the [address] [place] [address] since your last visit? \" No     3. For patients aged 39-70: Has the patient had a colonoscopy / FIT/ Cologuard? Yes - no Care Gap present      If the patient is female:    4. For patients aged 41-77: Has the patient had a mammogram within the past 2 years? Yes - no Care Gap present      5. For patients aged 21-65: Has the patient had a pap smear?  Yes - no Care Gap present

## 2022-07-03 NOTE — PROGRESS NOTES
[address]  [phone]  [address] [facility] [place], [place] [facility] [address]     Subjective:      Kana Parker, JACKIE-BC    Monika Garcia is a 49 y.o. female is here for new patient consultation. Pmhx nonobstructive CAD, HTN, HLD, KALPANA, Graves Disease and GERD. Current cigarette smoker--0.5 PPD  + family hx CAD (mother and brother)    Referred by pcp for intermittent palpitation. Seen in ED 6/28/2022 for atypical cp with negative cardiac work up. Today, endorses intermittent heart fluttering 2-3 times a week, lasts a couple of minutes and resolves on its own.  + occasional sob and some left arm pain. She continues to smoke, 0.5 PPD. States compliance  With PAP and medication therapy. The patient denies   orthopnea, PND, LE edema, syncope, presyncope or fatigue.     Patient Active Problem List    Diagnosis Date Noted    Biliary dyskinesia 11/03/2019    Graves disease     Severe obesity ([facility] [address] 12/27/2018    Sleep apnea 09/06/2017    Back pain 09/08/2016    Pre-diabetes 05/20/2016    Hypothyroidism 04/15/2015    Vertigo 03/17/2014    Pelvic mass 01/07/2014    Abnormal vaginal bleeding 01/07/2014    Migraine headache 10/08/2013    Chronic coronary artery disease 10/01/2013    Hypertension     Vitamin D deficiency     Hypokalemia     Sinusitis 11/04/2011    Overweight 10/17/2011    Benign hypertensive heart disease 09/22/2008      Negin Medina NP  Past Medical History:   Diagnosis Date    CAD (coronary artery disease) 10/2013    Colon polyps     Fatty liver     GERD (gastroesophageal reflux disease)     Graves disease     hypothyroid now since procedure    H. pylori infection     Headache     sometimes migraines    Hypertension     Hypokalemia     Menopause     Nausea & vomiting     Pneumothorax ~2012    Sleep apnea     cpap compliant    Vitamin D deficiency       Past Surgical History:   Procedure Laterality Date    HX BILATERAL SALPINGO-OOPHORECTOMY      HX CHOLECYSTECTOMY  2019    Lap debo w/cholangiogram    HX COLONOSCOPY      HX ENDOSCOPY      HX HEENT      Radioactive thyroid    HX HYSTERECTOMY      PARTIAL    [place] CARDIAC SURG PROCEDURE UNLIST  10-    CARDIAC CATH     Allergies   Allergen Reactions    Dilaudid [Hydromorphone (Bulk)] Unknown (comments)     Respiratory suppression leading to intubation      Family History   Problem Relation Age of Onset    Kidney Disease Mother     Hypertension Mother     Diabetes Mother     Heart Disease Mother     Cancer Father         Brain    Heart Disease Brother     Hypertension Brother     Diabetes Maternal Grandmother     Cancer Maternal Grandmother     Heart Disease Maternal Grandmother     Hypertension Brother     Hypertension Sister     Diabetes Sister     Hypertension Sister     Hypertension Sister     Glaucoma Brother       Social History     Socioeconomic History    Marital status: SINGLE     Spouse name: Not on file    Number of children: Not on file    Years of education: Not on file    Highest education level: Not on file   Occupational History    Not on file   Tobacco Use    Smoking status: Former Smoker     Packs/day: 1.00     Years: 10.00     Pack years: 10.00     Quit date: 2022     Years since quittin.4    Smokeless tobacco: Never Used   Vaping Use    Vaping Use: Never used   Substance and Sexual Activity    Alcohol use:  Yes     Alcohol/week: 0.0 standard drinks     Comment: some weekends one or two beers    Drug use: No    Sexual activity: Not on file   Other Topics Concern    Not on file   Social History Narrative    Not on file     Social Determinants of Health     Financial Resource Strain: Medium Risk    Difficulty of Paying Living Expenses: Somewhat hard   Food Insecurity: No Food Insecurity    Worried About Running Out of Food in the Last Year: Never true    John of Food in the Last Year: Never true   Transportation Needs: No Transportation Needs    Lack of Transportation (Medical): No    Lack of Transportation (Non-Medical): No   Physical Activity:     Days of Exercise per Week: Not on file    Minutes of Exercise per Session: Not on file   Stress:     Feeling of Stress : Not on file   Social Connections:     Frequency of Communication with Friends and Family: Not on file    Frequency of Social Gatherings with Friends and Family: Not on file    Attends Islam Services: Not on file    Active Member of [address] [place] [address] or Organizations: Not on file    Attends Club or Organization Meetings: Not on file    Marital Status: Not on file   Intimate Partner Violence:     Fear of Current or Ex-Partner: Not on file    Emotionally Abused: Not on file    Physically Abused: Not on file    Sexually Abused: Not on file   Housing Stability:     Unable to Pay for Housing in the Last Year: Not on file    Number of [place] in the Last Year: Not on file    Unstable Housing in the Last Year: Not on file      Current Outpatient Medications   Medication Sig    benazepriL (LOTENSIN) 40 mg tablet TAKE 1 TABLET BY MOUTH ONCE DAILY FOR HIGH BLOOD PRESSURE    topiramate (TOPAMAX) 25 mg tablet Take 1 tablet by mouth twice daily    pravastatin (PRAVACHOL) 10 mg tablet Take 1 tablet by mouth once daily    butalbital-acetaminophen-caffeine (FIORICET, ESGIC) -40 mg per tablet TAKE 1 TABLET BY MOUTH EVERY 6 HOURS AS NEEDED FOR HEADACHE    cetirizine (ZYRTEC) 10 mg tablet TAKE 1 TABLET BY MOUTH AT BEDTIME AS NEEDED FOR ALLERGIES    cyclobenzaprine (FLEXERIL) 10 mg tablet Take 1 Tablet by mouth nightly. Indications: muscle spasm    spironolactone (ALDACTONE) 25 mg tablet Take 1 Tablet by mouth daily. Indications: hypokalemia prevention    amLODIPine (NORVASC) 10 mg tablet Take 1 Tablet by mouth daily.  cholecalciferol, vitamin D3, (VITAMIN D3 PO) Take  by mouth. 125 mcg 5000 IU 1 A DAY     MULTIVITAMIN PO Take  by mouth daily.     omeprazole (PRILOSEC) 20 mg capsule Not a current medication    fluticasone propionate (FLONASE) 50 mcg/actuation nasal spray 2 sprays per nostril per day  Indications: inflammation of the nose due to an allergy (Patient taking differently: 2 sprays per nostril per day PRN  Indications: inflammation of the nose due to an allergy)    levothyroxine (SYNTHROID) 88 mcg tablet Take 1 Tab by mouth Daily (before breakfast).  naproxen sodium 220 mg cap Take 220 Caps by mouth daily as needed. 2 a day    aspirin 81 mg chewable tablet Take 1 Tab by mouth daily.  triamcinolone acetonide (KENALOG) 0.1 % topical cream Apply  to affected area two (2) times a day. use thin layer (Patient taking differently: Apply  to affected area as needed. use thin layer)    Wheat Dextrin (Benefiber Clear) 3 gram/3.5 gram pwpk Take 1 Each by mouth nightly. (Patient not taking: Reported on 7/6/2022)    diclofenac EC (VOLTAREN) 50 mg EC tablet Take 1 Tab by mouth two (2) times a day. (Patient not taking: Reported on 7/6/2022)     No current facility-administered medications for this visit. Review of Symptoms:  11 systems reviewed, negative other than as stated in the HPI    Physical ExamPhysical Exam:    Vitals:    07/06/22 1115   BP: 136/82   Pulse: 73   Resp: 18   Temp: 98.4 °F (36.9 °C)   TempSrc: Temporal   SpO2: 99%   Weight: 207 lb (93.9 kg)   Height: 5' 4\" (1.626 m)     Body mass index is 35.53 kg/m². General PE   General:  Well developed, in no acute distress, cooperative and alert  HEENT: No carotid bruits, no JVD, trach is midline. Neck Supple, PEERL, EOM intact. Heart:  reg rate and rhythm; normal S1/S2; no murmurs, gallops or rubs. Respiratory: Clear bilaterally x 4, no wheezing or rales  Abdomen:   Soft, non-tender, no distention, no masses. + BS. Extremities:  Normal cap refill, no cyanosis, atraumatic. No edema. Neuro: A&Ox3, speech clear, gait stable. Skin: Skin color is normal. No rashes or lesions. Non diaphoretic  Vascular: 2+ pulses symmetric in all extremities    Labs:   Lab Results   Component Value Date/Time    Cholesterol, total 205 (H) 06/23/2022 02:45 PM    Cholesterol, total 211 (H) 10/21/2020 03:47 PM    Cholesterol, total 188 02/18/2019 08:10 AM    Cholesterol, total 183 04/12/2018 09:47 AM    Cholesterol, total 179 11/17/2017 11:29 AM    HDL Cholesterol 62 06/23/2022 02:45 PM    HDL Cholesterol 66 10/21/2020 03:47 PM    HDL Cholesterol 57 02/18/2019 08:10 AM    HDL Cholesterol 65 04/12/2018 09:47 AM    HDL Cholesterol 66 11/17/2017 11:29 AM    LDL, calculated 121 (H) 06/23/2022 02:45 PM    LDL, calculated 126 (H) 10/21/2020 03:47 PM    LDL, calculated 109 (H) 02/18/2019 08:10 AM    LDL, calculated 104 (H) 04/12/2018 09:47 AM    LDL, calculated 102 (H) 11/17/2017 11:29 AM    LDL, calculated 101 (H) 05/20/2016 12:05 PM    Triglyceride 110 06/23/2022 02:45 PM    Triglyceride 109 10/21/2020 03:47 PM    Triglyceride 108 02/18/2019 08:10 AM    Triglyceride 70 04/12/2018 09:47 AM    Triglyceride 57 11/17/2017 11:29 AM    CHOL/HDL Ratio 3.3 06/23/2022 02:45 PM    CHOL/HDL Ratio 3.3 10/13/2013 10:30 AM     No results found for: CPK, CPKX, CPX  Lab Results   Component Value Date/Time    Sodium 143 06/26/2022 08:55 PM    Potassium 3.7 06/26/2022 08:55 PM    Chloride 108 06/26/2022 08:55 PM    CO2 24 06/26/2022 08:55 PM    Anion gap 11 06/26/2022 08:55 PM    Glucose 120 (H) 06/26/2022 08:55 PM    BUN 23 (H) 06/26/2022 08:55 PM    Creatinine 0.99 06/26/2022 08:55 PM    BUN/Creatinine ratio 23 (H) 06/26/2022 08:55 PM    GFR est AA >60 06/26/2022 08:55 PM    GFR est non-AA 59 (L) 06/26/2022 08:55 PM    Calcium 9.1 06/26/2022 08:55 PM    Bilirubin, total 0.2 06/26/2022 08:55 PM    Alk.  phosphatase 108 06/26/2022 08:55 PM    Protein, total 7.4 06/26/2022 08:55 PM    Albumin 4.0 06/26/2022 08:55 PM    Globulin 3.4 06/26/2022 08:55 PM    A-G Ratio 1.2 06/26/2022 08:55 PM    ALT (SGPT) 18 06/26/2022 08:55 PM EKG:  NSR NST changes     Assessment:     Assessment:        ICD-10-CM ICD-9-CM    1. Coronary artery disease due to lipid rich plaque  I25.10 414.00 AMB POC EKG ROUTINE W/ 12 LEADS, INTER & REP    I25.83 414.3 ECHO ADULT COMPLETE      CARDIAC EVENT MONITOR      NUCLEAR CARDIAC STRESS TEST   2. Intermittent palpitations  R00.2 785.1 AMB POC EKG ROUTINE W/ 12 LEADS, INTER & REP      ECHO ADULT COMPLETE      CARDIAC EVENT MONITOR      NUCLEAR CARDIAC STRESS TEST   3. Benign essential HTN  I10 401.1 AMB POC EKG ROUTINE W/ 12 LEADS, INTER & REP      ECHO ADULT COMPLETE      CARDIAC EVENT MONITOR      NUCLEAR CARDIAC STRESS TEST   4. Mixed hyperlipidemia  E78.2 272.2 AMB POC EKG ROUTINE W/ 12 LEADS, INTER & REP      ECHO ADULT COMPLETE      CARDIAC EVENT MONITOR      NUCLEAR CARDIAC STRESS TEST   5. Controlled type 2 diabetes mellitus with complication, without long-term current use of insulin (HCC)  E11.8 250.90 AMB POC EKG ROUTINE W/ 12 LEADS, INTER & REP      ECHO ADULT COMPLETE      CARDIAC EVENT MONITOR      NUCLEAR CARDIAC STRESS TEST   6. KALPANA (obstructive sleep apnea)  G47.33 327.23 AMB POC EKG ROUTINE W/ 12 LEADS, INTER & REP      ECHO ADULT COMPLETE      CARDIAC EVENT MONITOR      NUCLEAR CARDIAC STRESS TEST   7. Current smoker  F17.200 305.1 AMB POC EKG ROUTINE W/ 12 LEADS, INTER & REP      ECHO ADULT COMPLETE      CARDIAC EVENT MONITOR      NUCLEAR CARDIAC STRESS TEST   8. Family history of ischemic heart disease (IHD)  Z82.49 V17.3 AMB POC EKG ROUTINE W/ 12 LEADS, INTER & REP      ECHO ADULT COMPLETE      CARDIAC EVENT MONITOR      NUCLEAR CARDIAC STRESS TEST   9. Atypical chest pain  R07.89 786.59 ECHO ADULT COMPLETE      CARDIAC EVENT MONITOR      NUCLEAR CARDIAC STRESS TEST       Orders Placed This Encounter    AMB POC EKG ROUTINE W/ 12 LEADS, INTER & REP     Order Specific Question:   Reason for Exam:     Answer:   palps     [facility] in 10[facility]15/ [facility]  CONCLUSION  1.   Mild nonobstructive coronary artery disease in a right dominant system  with preserved left ventricular function. 2.  Normal left ventricular filling pressures. No aortic stenosis. Systemic  arterial pressures are moderately elevated. 3.  Normal left ventricular function with an estimated ejection fraction of  60% and no significant mitral regurgitation. 4.  Right dominant coronary artery system with mild nonobstructive coronary  artery disease. Plan:     1. Coronary artery disease due to lipid rich plaque  Nonobstructive CAD per [facility] in 2013  On ASA, CCB, statin  Check stress test cardiolyte      2. Intermittent palpitations  Hx Graves Disease  TSH ok 6/2022---on Levothyroxine followed by PCP  Obtain 2 week event, echo    3. Benign essential HTN  Controlled with current therapy  Serum Cr 0.99 in 6/2022    4. Mixed hyperlipidemia  6/2022  on Prava 10 mg Labs and lipids per PCP  At follow up---will discuss switching to high intensity statin     5. Controlled type 2 diabetes mellitus with complication, without long-term current use of insulin (HCC)  On oral agent    6. KALPANA (obstructive sleep apnea)  On PAP therapy  Followed by pulmonary with [facility]  Current smoker---tobacco cessation discussed with pt      Patient was made aware during visit today that all testing completed would be instantaneously available on their MyChart for review. Discussed that these results will be made available to the provider at the same time. They were advised to wait at least 3 business days to allow for provider's interpretation of results with follow-up before calling our office with concerns about their results.     Continue current care and f/u in 2-3 mos    Sandy Mccormick, PAUL

## 2022-07-04 NOTE — PROGRESS NOTES
Subjective:     Nadeem Coates is a 49 y.o. female who presents today with the following:  Chief Complaint   Patient presents with    Palpitations     f/u    Chest Pain     f/u fro [facility]       Patient Active Problem List   Diagnosis Code    Hypertension I10    Vitamin D deficiency E55.9    Hypokalemia E87.6    Migraine headache G43.909    Chronic coronary artery disease I25.10    Pelvic mass R19.00    Abnormal vaginal bleeding N93.9    Hypothyroidism E03.9    Vertigo R42    Pre-diabetes R73.03    Severe obesity ([address] E66.01    Graves disease E05.00    Biliary dyskinesia K82.8    Back pain M54.9    Benign hypertensive heart disease I11.9    Overweight E66.3    Sinusitis J32.9    Sleep apnea G47.30         COMPLIANT WITH MEDICATION:   Chest Pain /palpitations -(+)chest pain, (-)dyspnea, (+)palpitations, with left arm pain and numbness (not today) headache and blurred vision. Blood pressure normotensive. Reviewed both encounter [facility] and urgent care . Chest pain and palpitation resolved at present   In process of cardiac work up. Left hand and arm swelling and pain ; she endorses mild swelling of left hand, wrist and forearm x 2 days.      depression screening addressed not at risk    abuse screening addressed denies    learning assessment addressed reviewed nurses notes    fall risk addressed not at risk    HM: addressed    ROS:  Gen: denies fever, chills, fatigue, weight loss, weight gain  HEENT:denies blurry vision, nasal congestion, sore throat  Resp: denies dypsnea, cough, wheezing  CV: denies chest pain radiating to the jaws or arms, palpitations, lower extremity edema  Abd: denies nausea, vomiting, diarrhea, constipation  Neuro: denies numbness/tingling  Endo: denies polyuria, polydipsia, heat/cold intolerance  Heme: no lymphadenopathy    Allergies   Allergen Reactions    Dilaudid [Hydromorphone (Bulk)] Unknown (comments)     Respiratory suppression leading to intubation Current Outpatient Medications:     benazepriL (LOTENSIN) 40 mg tablet, TAKE 1 TABLET BY MOUTH ONCE DAILY FOR HIGH BLOOD PRESSURE, Disp: 90 Tablet, Rfl: 0    topiramate (TOPAMAX) 25 mg tablet, Take 1 tablet by mouth twice daily, Disp: 90 Tablet, Rfl: 0    pravastatin (PRAVACHOL) 10 mg tablet, Take 1 tablet by mouth once daily, Disp: 90 Tablet, Rfl: 0    butalbital-acetaminophen-caffeine (FIORICET, ESGIC) -40 mg per tablet, TAKE 1 TABLET BY MOUTH EVERY 6 HOURS AS NEEDED FOR HEADACHE, Disp: 30 Tablet, Rfl: 0    cetirizine (ZYRTEC) 10 mg tablet, TAKE 1 TABLET BY MOUTH AT BEDTIME AS NEEDED FOR ALLERGIES, Disp: 30 Tablet, Rfl: 5    cyclobenzaprine (FLEXERIL) 10 mg tablet, Take 1 Tablet by mouth nightly. Indications: muscle spasm, Disp: 10 Tablet, Rfl: 0    nicotine (NICODERM CQ) 14 mg/24 hr patch, APPLY 1 PATCH TOPICALLY EVERY 24 HOURS FOR 30 DAYS, Disp: 30 Patch, Rfl: 0    spironolactone (ALDACTONE) 25 mg tablet, Take 1 Tablet by mouth daily. Indications: hypokalemia prevention, Disp: 90 Tablet, Rfl: 1    amLODIPine (NORVASC) 10 mg tablet, Take 1 Tablet by mouth daily. , Disp: 90 Tablet, Rfl: 0    potassium chloride (K-DUR, KLOR-CON) 20 mEq tablet, Take 1 Tablet by mouth daily. Indications: low amount of potassium in the blood, Disp: 90 Tablet, Rfl: 1    cholecalciferol, vitamin D3, (VITAMIN D3 PO), Take  by mouth. 125 mcg 5000 IU 1 A DAY , Disp: , Rfl:     pantoprazole (PROTONIX) 40 mg tablet, PRN, Disp: , Rfl:     ondansetron (Zofran ODT) 4 mg disintegrating tablet, Take 1 Tab by mouth every eight (8) hours as needed for Nausea., Disp: 10 Tab, Rfl: 0    ondansetron (Zofran ODT) 4 mg disintegrating tablet, 1 Tab by SubLINGual route every eight (8) hours as needed for Nausea or Vomiting., Disp: 15 Tab, Rfl: 0    triamcinolone acetonide (KENALOG) 0.1 % topical cream, Apply  to affected area two (2) times a day.  use thin layer, Disp: 15 g, Rfl: 0    Wheat Dextrin (Benefiber Clear) 3 gram/3.5 gram pwpk, Take 1 Each by mouth nightly., Disp: 28 Packet, Rfl: 1    metFORMIN ER (GLUCOPHAGE XR) 500 mg tablet, Take 1 tablet by mouth twice daily (Patient taking differently: 1 a day), Disp: 180 Tab, Rfl: 0    diclofenac EC (VOLTAREN) 50 mg EC tablet, Take 1 Tab by mouth two (2) times a day., Disp: 30 Tab, Rfl: 0    MULTIVITAMIN PO, Take  by mouth daily. , Disp: , Rfl:     omeprazole (PRILOSEC) 20 mg capsule, Not a current medication, Disp: , Rfl: 0    fluticasone propionate (FLONASE) 50 mcg/actuation nasal spray, 2 sprays per nostril per day  Indications: inflammation of the nose due to an allergy (Patient taking differently: 2 sprays per nostril per day PRN  Indications: inflammation of the nose due to an allergy), Disp: 1 Bottle, Rfl: 3    levothyroxine (SYNTHROID) 88 mcg tablet, Take 1 Tab by mouth Daily (before breakfast). , Disp: 90 Tab, Rfl: 4    naproxen sodium 220 mg cap, Take 220 Caps by mouth daily as needed. 2 a day, Disp: , Rfl:     aspirin 81 mg chewable tablet, Take 1 Tab by mouth daily. , Disp: 30 Tab, Rfl: 11    Past Medical History:   Diagnosis Date    CAD (coronary artery disease) 10/2013    Colon polyps     Fatty liver     GERD (gastroesophageal reflux disease)     Graves disease     hypothyroid now since procedure    H. pylori infection     Headache     sometimes migraines    Hypertension     Hypokalemia     Menopause     Nausea & vomiting     Pneumothorax ~2012    Sleep apnea     cpap compliant    Vitamin D deficiency        Past Surgical History:   Procedure Laterality Date    HX BILATERAL SALPINGO-OOPHORECTOMY      HX CHOLECYSTECTOMY  11/04/2019    Lap debo w/cholangiogram    HX COLONOSCOPY      HX ENDOSCOPY      HX HEENT      Radioactive thyroid    HX HYSTERECTOMY      PARTIAL    [place] CARDIAC SURG PROCEDURE UNLIST  10-13    CARDIAC CATH       Social History     Tobacco Use   Smoking Status Former Smoker    Packs/day: 1.00    Years: 10.00    Pack years: 10.00    Quit date: 2022    Years since quittin.4   Smokeless Tobacco Never Used       Social History     Socioeconomic History    Marital status: SINGLE   Tobacco Use    Smoking status: Former Smoker     Packs/day: 1.00     Years: 10.00     Pack years: 10.00     Quit date: 2022     Years since quittin.4    Smokeless tobacco: Never Used   Vaping Use    Vaping Use: Never used   Substance and Sexual Activity    Alcohol use: Yes     Alcohol/week: 0.0 standard drinks     Comment: some weekends one or two beers    Drug use: No     Social Determinants of Health     Financial Resource Strain: Medium Risk    Difficulty of Paying Living Expenses: Somewhat hard   Food Insecurity: No Food Insecurity    Worried About Running Out of Food in the Last Year: Never true    John of Food in the Last Year: Never true   Transportation Needs: No Transportation Needs    Lack of Transportation (Medical): No    Lack of Transportation (Non-Medical): No       Family History   Problem Relation Age of Onset    Kidney Disease Mother     Hypertension Mother     Diabetes Mother     Heart Disease Mother     Cancer Father         Brain    Heart Disease Brother     Hypertension Brother     Diabetes Maternal Grandmother     Cancer Maternal Grandmother     Heart Disease Maternal Grandmother     Hypertension Brother     Hypertension Sister     Diabetes Sister     Hypertension Sister     Hypertension Sister     Glaucoma Brother          Objective:     Visit Vitals  /80 (BP 1 Location: Right upper arm, BP Patient Position: Sitting, BP Cuff Size: Large adult)   Pulse 76   Temp 97.9 °F (36.6 °C) (Temporal)   Resp 18   Ht 5' 4\" (1.626 m)   Wt 206 lb (93.4 kg)   SpO2 98%   BMI 35.36 kg/m²     Body mass index is 35.36 kg/m². General: Alert and oriented. No acute distress. Well nourished  HEENT :  Ears:TMs are normal. Canals are clear. Eyes: pupils equal, round, react to light and accommodation.  Extra ocular movements intact. Nose: patent. Mouth and throat is clear. Neck:supple full range of motion no thyromegaly. Trachea midline, No carotid bruits. No significant lymphadenopathy  Lungs[de-identified] clear to auscultation without wheezes, rales, or rhonchi. Heart :RRR, S1 & S2 are normal intensity. No murmur; no gallop  Extremities: without clubbing, or cyanosis, left hand wrist and fore arm mild edema. Insect bite noted on lateral aspect of the wrist   Pulses: radial and femoral pulses are normal  Neuro: HMF intact. Cranial nerves II through XII grossly normal.  Motor: is 5 over 5 and symmetrical.   Deep tendon reflexes: +2 equal  Psych:appropriate behavior, mood, affect and judgement. Results for orders placed or performed during the hospital encounter of 06/26/22   COVID-19 WITH INFLUENZA A/B   Result Value Ref Range    SARS-CoV-2 by PCR Not detected NOTD      Influenza A by PCR Not detected NOTD      Influenza B by PCR Not detected NOTD     CBC WITH AUTOMATED DIFF   Result Value Ref Range    WBC 10.2 3.6 - 11.0 K/uL    RBC 4.39 3.80 - 5.20 M/uL    HGB 13.9 11.5 - 16.0 g/dL    HCT 40.5 35.0 - 47.0 %    MCV 92.3 80.0 - 99.0 FL    MCH 31.7 26.0 - 34.0 PG    MCHC 34.3 30.0 - 36.5 g/dL    RDW 14.0 11.5 - 14.5 %    PLATELET 763 844 - 348 K/uL    MPV 10.5 8.9 - 12.9 FL    NRBC 0.0 0  WBC    ABSOLUTE NRBC 0.00 0.00 - 0.01 K/uL    NEUTROPHILS 70 32 - 75 %    LYMPHOCYTES 23 12 - 49 %    MONOCYTES 5 5 - 13 %    EOSINOPHILS 1 0 - 7 %    BASOPHILS 1 0 - 1 %    IMMATURE GRANULOCYTES 0 0.0 - 0.5 %    ABS. NEUTROPHILS 7.2 1.8 - 8.0 K/UL    ABS. LYMPHOCYTES 2.3 0.8 - 3.5 K/UL    ABS. MONOCYTES 0.5 0.0 - 1.0 K/UL    ABS. EOSINOPHILS 0.1 0.0 - 0.4 K/UL    ABS. BASOPHILS 0.1 0.0 - 0.1 K/UL    ABS. IMM.  GRANS. 0.0 0.00 - 0.04 K/UL    DF AUTOMATED      PLATELET COMMENTS ADEQUATE PLATELETS      RBC COMMENTS ANISOCYTOSIS  1+       METABOLIC PANEL, COMPREHENSIVE   Result Value Ref Range    Sodium 143 136 - 145 mmol/L    Potassium 3.7 3.5 - 5.1 mmol/L    Chloride 108 97 - 108 mmol/L    CO2 24 21 - 32 mmol/L    Anion gap 11 5 - 15 mmol/L    Glucose 120 (H) 65 - 100 mg/dL    BUN 23 (H) 6 - 20 MG/DL    Creatinine 0.99 0.55 - 1.02 MG/DL    BUN/Creatinine ratio 23 (H) 12 - 20      GFR est AA >60 >60 ml/min/1.73m2    GFR est non-AA 59 (L) >60 ml/min/1.73m2    Calcium 9.1 8.5 - 10.1 MG/DL    Bilirubin, total 0.2 0.2 - 1.0 MG/DL    ALT (SGPT) 18 12 - 78 U/L    AST (SGOT) 16 15 - 37 U/L    Alk. phosphatase 108 45 - 117 U/L    Protein, total 7.4 6.4 - 8.2 g/dL    Albumin 4.0 3.5 - 5.0 g/dL    Globulin 3.4 2.0 - 4.0 g/dL    A-G Ratio 1.2 1.1 - 2.2     TROPONIN-HIGH SENSITIVITY   Result Value Ref Range    Troponin-High Sensitivity 5 0 - 51 ng/L   MAGNESIUM   Result Value Ref Range    Magnesium 2.3 1.6 - 2.4 mg/dL   EKG, 12 LEAD, INITIAL   Result Value Ref Range    Ventricular Rate 67 BPM    Atrial Rate 67 BPM    P-R Interval 152 ms    QRS Duration 102 ms    Q-T Interval 408 ms    QTC Calculation (Bezet) 431 ms    Calculated P Axis 50 degrees    Calculated R Axis -6 degrees    Calculated T Axis 46 degrees    Diagnosis       Normal sinus rhythm  Normal ECG  When compared with ECG of 18-JUL-2014 03:22,  No significant change was found  Confirmed by Michelle Zamudio ([id number]) on 6/27/2022 8:09:21 AM         No results found for this visit on 06/28/22. Assessment/ Plan:     1. Insect bite of left wrist, initial encounter  Observe for s/s of infections   Keep area clean and dry. Orders Placed This Encounter    triamcinolone acetonide (KENALOG-40) 40 mg/mL injection 40 mg         Verbal and written instructions (see AVS) provided. Patient expresses understanding of diagnosis and treatment plan.     Health Maintenance Due   Topic Date Due    Shingrix Vaccine Age 49> (1 of 2) Never done               SPENSER Rodriguez

## 2022-07-06 ENCOUNTER — OFFICE VISIT (OUTPATIENT)
Dept: CARDIOLOGY CLINIC | Age: 52
End: 2022-07-06
Payer: COMMERCIAL

## 2022-07-06 VITALS
SYSTOLIC BLOOD PRESSURE: 136 MMHG | RESPIRATION RATE: 18 BRPM | WEIGHT: 207 LBS | HEIGHT: 64 IN | HEART RATE: 73 BPM | BODY MASS INDEX: 35.34 KG/M2 | TEMPERATURE: 98.4 F | OXYGEN SATURATION: 99 % | DIASTOLIC BLOOD PRESSURE: 82 MMHG

## 2022-07-06 DIAGNOSIS — E78.2 MIXED HYPERLIPIDEMIA: ICD-10-CM

## 2022-07-06 DIAGNOSIS — I10 BENIGN ESSENTIAL HTN: ICD-10-CM

## 2022-07-06 DIAGNOSIS — Z82.49 FAMILY HISTORY OF ISCHEMIC HEART DISEASE (IHD): ICD-10-CM

## 2022-07-06 DIAGNOSIS — G47.33 OSA (OBSTRUCTIVE SLEEP APNEA): ICD-10-CM

## 2022-07-06 DIAGNOSIS — F17.200 CURRENT SMOKER: ICD-10-CM

## 2022-07-06 DIAGNOSIS — R00.2 INTERMITTENT PALPITATIONS: ICD-10-CM

## 2022-07-06 DIAGNOSIS — R07.89 ATYPICAL CHEST PAIN: ICD-10-CM

## 2022-07-06 DIAGNOSIS — I25.10 CORONARY ARTERY DISEASE DUE TO LIPID RICH PLAQUE: Primary | ICD-10-CM

## 2022-07-06 DIAGNOSIS — E11.8 CONTROLLED TYPE 2 DIABETES MELLITUS WITH COMPLICATION, WITHOUT LONG-TERM CURRENT USE OF INSULIN (HCC): ICD-10-CM

## 2022-07-06 DIAGNOSIS — I25.83 CORONARY ARTERY DISEASE DUE TO LIPID RICH PLAQUE: Primary | ICD-10-CM

## 2022-07-06 PROCEDURE — 93000 ELECTROCARDIOGRAM COMPLETE: CPT | Performed by: NURSE PRACTITIONER

## 2022-07-06 PROCEDURE — 99204 OFFICE O/P NEW MOD 45 MIN: CPT | Performed by: NURSE PRACTITIONER

## 2022-07-06 PROCEDURE — 3044F HG A1C LEVEL LT 7.0%: CPT | Performed by: NURSE PRACTITIONER

## 2022-07-06 NOTE — PROGRESS NOTES
Identified pt with two pt identifiers(name and ). Reviewed record in preparation for visit and have obtained necessary documentation. Chief Complaint   Patient presents with    Coronary Artery Disease     referred from yael schmid for palps    Hypertension    Irregular Heart Beat      Vitals:    22 1115   BP: 136/82   Pulse: 73   Resp: 18   Temp: 98.4 °F (36.9 °C)   TempSrc: Temporal   SpO2: 99%   Weight: 207 lb (93.9 kg)   Height: 5' 4\" (1.626 m)   PainSc:   0 - No pain       Medications reviewed/approved by provider. Health Maintenance Review: Patient reminded of \"due or due soon\" health maintenance. I have asked the patient to contact his/her primary care provider (PCP) for follow-up on his/her health maintenance. Coordination of Care Questionnaire:  :   1) Have you been to an emergency room, urgent care, or hospitalized since your last visit? If yes, where when, and reason for visit? yes [facility] and Urgent care in [facility] both for tingling in the left arm and palps in the same week 2022      2. Have seen or consulted any other health care provider since your last visit? If yes, where when, and reason for visit? YES Dr. Matthew Mckeon PCP and  Urgent care in [facility] for tingling in left arm 2022      Patient is accompanied by friend I have received verbal consent from Roxy Mariscal to discuss any/all medical information while they are present in the room.

## 2022-07-08 ENCOUNTER — CLINICAL SUPPORT (OUTPATIENT)
Dept: CARDIOLOGY CLINIC | Age: 52
End: 2022-07-08
Payer: COMMERCIAL

## 2022-07-08 DIAGNOSIS — R00.2 PALPITATION: Primary | ICD-10-CM

## 2022-07-08 PROCEDURE — 93272 ECG/REVIEW INTERPRET ONLY: CPT | Performed by: INTERNAL MEDICINE

## 2022-07-08 NOTE — PROGRESS NOTES
OFFICE  hook up  14 day EVENT monitor only. Verified patient with two patient identifiers. Patient verbalized understanding of its use. Ordering Provider: Lian Collier MD  Reason: Intermittent palpitations [R00.2 (ICD-10-CM)]; Benign essential HTN [I10 (ICD-10-CM)]; Mixed hyperlipidemia [E78.2 (ICD-10-CM)]; Controlled type 2 diabetes mellitus with complication, without long-term current use of insulin ([facility] [address] [E11.8 (ICD-10-CM)]; Coronary artery disease due to lipid rich plaque [I25.10, I25.83 (ICD-10-CM)]; KALPANA (obstructive sleep apnea) [G47.33 (ICD-10-CM)]; Current smoker [F17.200 (ICD-10-CM)]; Family history of ischemic heart disease (IHD) [Z82.49 (ICD-10-CM)]; Atypical chest pain [R07.89 (ICD-10-CM)]      Patient has been successfully enrolled through [employer]. No LOS.

## 2022-07-09 RX ORDER — BUTALBITAL, ACETAMINOPHEN AND CAFFEINE 50; 325; 40 MG/1; MG/1; MG/1
TABLET ORAL
Qty: 30 TABLET | Refills: 0 | Status: SHIPPED | OUTPATIENT
Start: 2022-07-09 | End: 2022-07-13

## 2022-07-13 RX ORDER — BUTALBITAL, ACETAMINOPHEN AND CAFFEINE 50; 325; 40 MG/1; MG/1; MG/1
TABLET ORAL
Qty: 30 TABLET | Refills: 0 | Status: SHIPPED | OUTPATIENT
Start: 2022-07-13 | End: 2022-07-15

## 2022-07-15 ENCOUNTER — TELEPHONE (OUTPATIENT)
Dept: CARDIOLOGY CLINIC | Age: 52
End: 2022-07-15

## 2022-07-15 RX ORDER — BUTALBITAL, ACETAMINOPHEN AND CAFFEINE 50; 325; 40 MG/1; MG/1; MG/1
TABLET ORAL
Qty: 30 TABLET | Refills: 0 | Status: SHIPPED | OUTPATIENT
Start: 2022-07-15 | End: 2022-10-17 | Stop reason: SDUPTHER

## 2022-07-15 RX ORDER — BUTALBITAL, ACETAMINOPHEN AND CAFFEINE 50; 325; 40 MG/1; MG/1; MG/1
TABLET ORAL
Qty: 30 TABLET | Refills: 0 | Status: SHIPPED | OUTPATIENT
Start: 2022-07-15 | End: 2022-07-15 | Stop reason: SDUPTHER

## 2022-07-18 ENCOUNTER — ANCILLARY PROCEDURE (OUTPATIENT)
Dept: CARDIOLOGY CLINIC | Age: 52
End: 2022-07-18
Payer: COMMERCIAL

## 2022-07-18 VITALS
SYSTOLIC BLOOD PRESSURE: 142 MMHG | DIASTOLIC BLOOD PRESSURE: 82 MMHG | BODY MASS INDEX: 35.34 KG/M2 | WEIGHT: 207 LBS | HEIGHT: 64 IN

## 2022-07-18 DIAGNOSIS — I25.10 CHRONIC CORONARY ARTERY DISEASE: ICD-10-CM

## 2022-07-18 DIAGNOSIS — F17.200 CURRENT SMOKER: ICD-10-CM

## 2022-07-18 DIAGNOSIS — E66.01 SEVERE OBESITY (HCC): ICD-10-CM

## 2022-07-18 DIAGNOSIS — G47.33 OSA (OBSTRUCTIVE SLEEP APNEA): ICD-10-CM

## 2022-07-18 DIAGNOSIS — E66.3 OVERWEIGHT: ICD-10-CM

## 2022-07-18 DIAGNOSIS — I25.83 CORONARY ARTERY DISEASE DUE TO LIPID RICH PLAQUE: ICD-10-CM

## 2022-07-18 DIAGNOSIS — R07.89 ATYPICAL CHEST PAIN: ICD-10-CM

## 2022-07-18 DIAGNOSIS — R00.2 INTERMITTENT PALPITATIONS: ICD-10-CM

## 2022-07-18 DIAGNOSIS — R73.03 PRE-DIABETES: ICD-10-CM

## 2022-07-18 DIAGNOSIS — E05.00 GRAVES DISEASE: ICD-10-CM

## 2022-07-18 DIAGNOSIS — Z82.49 FAMILY HISTORY OF ISCHEMIC HEART DISEASE (IHD): ICD-10-CM

## 2022-07-18 DIAGNOSIS — E11.8 CONTROLLED TYPE 2 DIABETES MELLITUS WITH COMPLICATION, WITHOUT LONG-TERM CURRENT USE OF INSULIN (HCC): ICD-10-CM

## 2022-07-18 DIAGNOSIS — I10 BENIGN ESSENTIAL HTN: ICD-10-CM

## 2022-07-18 DIAGNOSIS — E78.2 MIXED HYPERLIPIDEMIA: ICD-10-CM

## 2022-07-18 DIAGNOSIS — I10 PRIMARY HYPERTENSION: ICD-10-CM

## 2022-07-18 DIAGNOSIS — I25.10 CORONARY ARTERY DISEASE DUE TO LIPID RICH PLAQUE: ICD-10-CM

## 2022-07-18 PROCEDURE — A9500 TC99M SESTAMIBI: HCPCS | Performed by: INTERNAL MEDICINE

## 2022-07-18 PROCEDURE — 93015 CV STRESS TEST SUPVJ I&R: CPT | Performed by: INTERNAL MEDICINE

## 2022-07-18 PROCEDURE — 78452 HT MUSCLE IMAGE SPECT MULT: CPT | Performed by: INTERNAL MEDICINE

## 2022-07-18 RX ORDER — TETRAKIS(2-METHOXYISOBUTYLISOCYANIDE)COPPER(I) TETRAFLUOROBORATE 1 MG/ML
10 INJECTION, POWDER, LYOPHILIZED, FOR SOLUTION INTRAVENOUS ONCE
Status: COMPLETED | OUTPATIENT
Start: 2022-07-18 | End: 2022-07-18

## 2022-07-18 RX ORDER — TETRAKIS(2-METHOXYISOBUTYLISOCYANIDE)COPPER(I) TETRAFLUOROBORATE 1 MG/ML
30 INJECTION, POWDER, LYOPHILIZED, FOR SOLUTION INTRAVENOUS ONCE
Status: COMPLETED | OUTPATIENT
Start: 2022-07-18 | End: 2022-07-18

## 2022-07-18 RX ADMIN — TETRAKIS(2-METHOXYISOBUTYLISOCYANIDE)COPPER(I) TETRAFLUOROBORATE 26.1 MILLICURIE: 1 INJECTION, POWDER, LYOPHILIZED, FOR SOLUTION INTRAVENOUS at 15:00

## 2022-07-18 RX ADMIN — TETRAKIS(2-METHOXYISOBUTYLISOCYANIDE)COPPER(I) TETRAFLUOROBORATE 8.2 MILLICURIE: 1 INJECTION, POWDER, LYOPHILIZED, FOR SOLUTION INTRAVENOUS at 13:10

## 2022-07-19 LAB
NUC STRESS EJECTION FRACTION: 59 %
STRESS BASELINE DIAS BP: 80 MMHG
STRESS BASELINE HR: 56 BPM
STRESS BASELINE ST DEPRESSION: 0 MM
STRESS BASELINE SYS BP: 150 MMHG
STRESS O2 SAT PEAK: 98 %
STRESS O2 SAT REST: 98 %
STRESS PEAK DIAS BP: 80 MMHG
STRESS PEAK SYS BP: 170 MMHG
STRESS PERCENT HR ACHIEVED: 81 %
STRESS POST PEAK HR: 138 BPM
STRESS RATE PRESSURE PRODUCT: NORMAL BPM*MMHG
STRESS ST DEPRESSION: 0 MM
STRESS TARGET HR: 170 BPM
TID: 1.08

## 2022-07-20 NOTE — PROGRESS NOTES
Please call the patient and inform him stress test was normal.  Low concern for blockages of the heart arteries.     Will see her at follow up in September

## 2022-07-22 ENCOUNTER — TELEPHONE (OUTPATIENT)
Dept: CARDIOLOGY CLINIC | Age: 52
End: 2022-07-22

## 2022-07-22 NOTE — TELEPHONE ENCOUNTER
----- Message from Stephane Jones NP sent at 7/20/2022  7:01 AM EDT -----  Please call the patient and inform him stress test was normal.  Low concern for blockages of the heart arteries.     Will see her at follow up in September

## 2022-07-26 ENCOUNTER — HOSPITAL ENCOUNTER (OUTPATIENT)
Dept: ULTRASOUND IMAGING | Age: 52
Discharge: HOME | End: 2022-07-26
Attending: NURSE PRACTITIONER
Payer: COMMERCIAL

## 2022-07-26 DIAGNOSIS — F17.200 CURRENT SMOKER: ICD-10-CM

## 2022-07-26 DIAGNOSIS — I25.10 CORONARY ARTERY DISEASE DUE TO LIPID RICH PLAQUE: ICD-10-CM

## 2022-07-26 DIAGNOSIS — R00.2 INTERMITTENT PALPITATIONS: ICD-10-CM

## 2022-07-26 DIAGNOSIS — G47.33 OSA (OBSTRUCTIVE SLEEP APNEA): ICD-10-CM

## 2022-07-26 DIAGNOSIS — E11.8 CONTROLLED TYPE 2 DIABETES MELLITUS WITH COMPLICATION, WITHOUT LONG-TERM CURRENT USE OF INSULIN (HCC): ICD-10-CM

## 2022-07-26 DIAGNOSIS — R07.89 ATYPICAL CHEST PAIN: ICD-10-CM

## 2022-07-26 DIAGNOSIS — E78.2 MIXED HYPERLIPIDEMIA: ICD-10-CM

## 2022-07-26 DIAGNOSIS — Z82.49 FAMILY HISTORY OF ISCHEMIC HEART DISEASE (IHD): ICD-10-CM

## 2022-07-26 DIAGNOSIS — I25.83 CORONARY ARTERY DISEASE DUE TO LIPID RICH PLAQUE: ICD-10-CM

## 2022-07-26 DIAGNOSIS — I10 BENIGN ESSENTIAL HTN: ICD-10-CM

## 2022-07-26 PROCEDURE — 93306 TTE W/DOPPLER COMPLETE: CPT

## 2022-07-30 ENCOUNTER — TELEPHONE (OUTPATIENT)
Dept: CARDIOLOGY CLINIC | Age: 52
End: 2022-07-30

## 2022-07-30 LAB
ECHO AO ROOT DIAM: 2.9 CM
ECHO AV PEAK GRADIENT: 7 MMHG
ECHO AV PEAK VELOCITY: 1.3 M/S
ECHO EST RA PRESSURE: 10 MMHG
ECHO LA DIAMETER: 3.5 CM
ECHO LA TO AORTIC ROOT RATIO: 1.21
ECHO LV E' SEPTAL VELOCITY: 11 CM/S
ECHO LV FRACTIONAL SHORTENING: 30 % (ref 28–44)
ECHO LV INTERNAL DIMENSION DIASTOLIC: 4.4 CM (ref 3.9–5.3)
ECHO LV INTERNAL DIMENSION SYSTOLIC: 3.1 CM
ECHO LV IVSD: 1.3 CM (ref 0.6–0.9)
ECHO LV MASS 2D: 215.1 G (ref 67–162)
ECHO LV POSTERIOR WALL DIASTOLIC: 1.3 CM (ref 0.6–0.9)
ECHO LV RELATIVE WALL THICKNESS RATIO: 0.59
ECHO LVOT AREA: 3.5 CM2
ECHO LVOT DIAM: 2.1 CM
ECHO MV A VELOCITY: 0.72 M/S
ECHO MV E DECELERATION TIME (DT): 218.8 MS
ECHO MV E VELOCITY: 0.84 M/S
ECHO MV E/A RATIO: 1.17
ECHO MV E/E' SEPTAL: 7.64
ECHO PULMONARY ARTERY END DIASTOLIC PRESSURE: 5 MMHG
ECHO PV REGURGITANT MAX VELOCITY: 1.1 M/S
ECHO RIGHT VENTRICULAR SYSTOLIC PRESSURE (RVSP): 26 MMHG
ECHO TV REGURGITANT MAX VELOCITY: 2.01 M/S
ECHO TV REGURGITANT PEAK GRADIENT: 16 MMHG

## 2022-07-30 PROCEDURE — 93306 TTE W/DOPPLER COMPLETE: CPT | Performed by: INTERNAL MEDICINE

## 2022-07-30 NOTE — PROGRESS NOTES
Echo looks great! Heart is pumping nice and strong; heart valves look good and healthy. No evidence of enlargement or aneurysm.   Continue same meds,  keep good  Bp control, aim for BP < 130/80

## 2022-07-30 NOTE — TELEPHONE ENCOUNTER
Please advise she has early heartbeats from the upper and lower chambers of the heart occasionally. Approximately 1% of the time for the heartbeats from the bottom chamber of the heart. This is what she is feeling. This is not dangerous, and no treatment is needed. If they are really bothersome, we can try adding metoprolol or changing amlodipine to diltiazem. Please let us know if she wants to try medication.

## 2022-08-01 NOTE — TELEPHONE ENCOUNTER
Spoke with the patient. Verified patient with two patient identifiers. Results given and questions answered. Patient wishes to wait until her appt in Sept before she makes a change. Educated her on the importance of hydration especially when working outside. Patient verbalized understanding.

## 2022-08-04 ENCOUNTER — OFFICE VISIT (OUTPATIENT)
Dept: FAMILY MEDICINE CLINIC | Age: 52
End: 2022-08-04
Payer: COMMERCIAL

## 2022-08-04 VITALS
RESPIRATION RATE: 20 BRPM | DIASTOLIC BLOOD PRESSURE: 70 MMHG | OXYGEN SATURATION: 97 % | HEART RATE: 79 BPM | SYSTOLIC BLOOD PRESSURE: 102 MMHG | TEMPERATURE: 97.4 F | BODY MASS INDEX: 35.85 KG/M2 | HEIGHT: 64 IN | WEIGHT: 210 LBS

## 2022-08-04 DIAGNOSIS — H57.89 RED EYES: ICD-10-CM

## 2022-08-04 DIAGNOSIS — E05.00 GRAVES DISEASE: Primary | ICD-10-CM

## 2022-08-04 PROCEDURE — 99213 OFFICE O/P EST LOW 20 MIN: CPT | Performed by: NURSE PRACTITIONER

## 2022-08-04 NOTE — PROGRESS NOTES
1. \"Have you been to the ER, urgent care clinic since your last visit? Hospitalized since your last visit? \" No    2. \"Have you seen or consulted any other health care providers outside of the [address] [place] [address] since your last visit? \" No     3. For patients aged 39-70: Has the patient had a colonoscopy / FIT/ Cologuard? Yes  no Care Gap present      If the patient is female:    4. For patients aged 41-77: Has the patient had a mammogram within the past 2 years? Yes, no are no are Gap noted      5. For patients aged 21-65: Has the patient had a pap smear?  Yes - no Care Gap present

## 2022-08-06 NOTE — PROGRESS NOTES
Subjective:     Monika Garcia is a 49 y.o. female who presents today with the following:  Chief Complaint   Patient presents with    Eye Problem     Rt eye twitching       Patient Active Problem List   Diagnosis Code    Hypertension I10    Vitamin D deficiency E55.9    Hypokalemia E87.6    Migraine headache G43.909    Chronic coronary artery disease I25.10    Pelvic mass R19.00    Abnormal vaginal bleeding N93.9    Hypothyroidism E03.9    Vertigo R42    Severe obesity (HCC) E66.01    Graves disease E05.00    Biliary dyskinesia K82.8    Back pain M54.9    Benign hypertensive heart disease I11.9    Overweight E66.3    Sinusitis J32.9    Sleep apnea G47.30         COMPLIANT WITH MEDICATION:   HTN; Denies chest pain, dyspnea, palpitations, headache and blurred vision. Blood pressure normotensive. Dry eyes; hx of graves disease followed by Dr. Jesus Asencio. We discussed dry eyes and treatment. Following up with an eye professional. She endorses the eye twitching is prominent when she is upset or stress. We discussed techniques to help with stress.     depression screening addressed not at risk    abuse screening addressed denies    learning assessment addressed reviewed nurses notes    fall risk addressed not at risk    HM: addressed    ROS:  Gen: denies fever, chills, fatigue, weight loss, weight gain  HEENT:denies blurry vision, nasal congestion, sore throat  Resp: denies dypsnea, cough, wheezing  CV: denies chest pain radiating to the jaws or arms, palpitations, lower extremity edema  Abd: denies nausea, vomiting, diarrhea, constipation  Neuro: denies numbness/tingling  Endo: denies polyuria, polydipsia, heat/cold intolerance  Heme: no lymphadenopathy    Allergies   Allergen Reactions    Dilaudid [Hydromorphone (Bulk)] Unknown (comments)     Respiratory suppression leading to intubation         Current Outpatient Medications:     butalbital-acetaminophen-caffeine (FIORICET, ESGIC) -40 mg per tablet, TAKE 1 TABLET BY MOUTH EVERY 6 HOURS AS NEEDED FOR HEADACHE, Disp: 30 Tablet, Rfl: 0    benazepriL (LOTENSIN) 40 mg tablet, TAKE 1 TABLET BY MOUTH ONCE DAILY FOR HIGH BLOOD PRESSURE, Disp: 90 Tablet, Rfl: 0    topiramate (TOPAMAX) 25 mg tablet, Take 1 tablet by mouth twice daily, Disp: 90 Tablet, Rfl: 0    pravastatin (PRAVACHOL) 10 mg tablet, Take 1 tablet by mouth once daily, Disp: 90 Tablet, Rfl: 0    cetirizine (ZYRTEC) 10 mg tablet, TAKE 1 TABLET BY MOUTH AT BEDTIME AS NEEDED FOR ALLERGIES, Disp: 30 Tablet, Rfl: 5    cyclobenzaprine (FLEXERIL) 10 mg tablet, Take 1 Tablet by mouth nightly. Indications: muscle spasm, Disp: 10 Tablet, Rfl: 0    spironolactone (ALDACTONE) 25 mg tablet, Take 1 Tablet by mouth daily. Indications: hypokalemia prevention, Disp: 90 Tablet, Rfl: 1    amLODIPine (NORVASC) 10 mg tablet, Take 1 Tablet by mouth daily. , Disp: 90 Tablet, Rfl: 0    cholecalciferol, vitamin D3, (VITAMIN D3 PO), Take  by mouth. 125 mcg 5000 IU 1 A DAY , Disp: , Rfl:     triamcinolone acetonide (KENALOG) 0.1 % topical cream, Apply  to affected area two (2) times a day. use thin layer (Patient taking differently: Apply  to affected area as needed. use thin layer), Disp: 15 g, Rfl: 0    MULTIVITAMIN PO, Take  by mouth daily. , Disp: , Rfl:     omeprazole (PRILOSEC) 20 mg capsule, Not a current medication, Disp: , Rfl: 0    levothyroxine (SYNTHROID) 88 mcg tablet, Take 1 Tab by mouth Daily (before breakfast). , Disp: 90 Tab, Rfl: 4    naproxen sodium 220 mg cap, Take 220 Caps by mouth daily as needed. 2 a day, Disp: , Rfl:     aspirin 81 mg chewable tablet, Take 1 Tab by mouth daily. , Disp: 30 Tab, Rfl: 11    Wheat Dextrin (Benefiber Clear) 3 gram/3.5 gram pwpk, Take 1 Each by mouth nightly. (Patient not taking: No sig reported), Disp: 28 Packet, Rfl: 1    diclofenac EC (VOLTAREN) 50 mg EC tablet, Take 1 Tab by mouth two (2) times a day.  (Patient not taking: No sig reported), Disp: 30 Tab, Rfl: 0    fluticasone propionate (FLONASE) 48 mcg/actuation nasal spray, 2 sprays per nostril per day  Indications: inflammation of the nose due to an allergy (Patient taking differently: 2 sprays per nostril per day PRN  Indications: inflammation of the nose due to an allergy), Disp: 1 Bottle, Rfl: 3    Past Medical History:   Diagnosis Date    CAD (coronary artery disease) 10/2013    Colon polyps     Fatty liver     GERD (gastroesophageal reflux disease)     Graves disease     hypothyroid now since procedure    H. pylori infection     Headache     sometimes migraines    Hypertension     Hypokalemia     Menopause     Nausea & vomiting     Pneumothorax ~    Sleep apnea     cpap compliant    Vitamin D deficiency        Past Surgical History:   Procedure Laterality Date    HX BILATERAL SALPINGO-OOPHORECTOMY      HX CHOLECYSTECTOMY  2019    Lap debo w/cholangiogram    HX COLONOSCOPY      HX ENDOSCOPY      HX HEENT      Radioactive thyroid    HX HYSTERECTOMY      PARTIAL    VT CARDIAC SURG PROCEDURE UNLIST  10-13    CARDIAC CATH       Social History     Tobacco Use   Smoking Status Former    Packs/day: 1.00    Years: 10.00    Pack years: 10.00    Types: Cigarettes    Quit date: 2022    Years since quittin.5   Smokeless Tobacco Never       Social History     Socioeconomic History    Marital status: SINGLE   Tobacco Use    Smoking status: Former     Packs/day: 1.00     Years: 10.00     Pack years: 10.00     Types: Cigarettes     Quit date: 2022     Years since quittin.5    Smokeless tobacco: Never   Vaping Use    Vaping Use: Never used   Substance and Sexual Activity    Alcohol use:  Yes     Alcohol/week: 0.0 standard drinks     Comment: some weekends one or two beers    Drug use: No     Social Determinants of Health     Financial Resource Strain: Medium Risk    Difficulty of Paying Living Expenses: Somewhat hard   Food Insecurity: No Food Insecurity    Worried About Running Out of Food in the Last Year: Never true    Ran Out of Food in the Last Year: Never true   Transportation Needs: No Transportation Needs    Lack of Transportation (Medical): No    Lack of Transportation (Non-Medical): No       Family History   Problem Relation Age of Onset    Kidney Disease Mother     Hypertension Mother     Diabetes Mother     Heart Disease Mother     Cancer Father         Brain    Heart Disease Brother     Hypertension Brother     Diabetes Maternal Grandmother     Cancer Maternal Grandmother     Heart Disease Maternal Grandmother     Hypertension Brother     Hypertension Sister     Diabetes Sister     Hypertension Sister     Hypertension Sister     Glaucoma Brother          Objective:     Visit Vitals  /70 (BP 1 Location: Right upper arm, BP Patient Position: Sitting, BP Cuff Size: Large adult)   Pulse 79   Temp 97.4 °F (36.3 °C) (Temporal)   Resp 20   Ht 5' 4\" (1.626 m)   Wt 210 lb (95.3 kg)   SpO2 97%   BMI 36.05 kg/m²     Body mass index is 36.05 kg/m². General: Alert and oriented. No acute distress. Well nourished  HEENT :  Ears:TMs are normal. Canals are clear. Eyes: pupils equal, round, react to light and accommodation. Extra ocular movements intact. Nose: patent. Mouth and throat is clear. Neck:supple full range of motion no thyromegaly. Trachea midline, No carotid bruits. No significant lymphadenopathy  Lungs[de-identified] clear to auscultation without wheezes, rales, or rhonchi. Heart :RRR, S1 & S2 are normal intensity. No murmur; no gallop  Abdomen: bowel sounds active. No tenderness, guarding, rebound, masses, hepatic or spleen enlargement  Back: no CVA tenderness. Extremities: without clubbing, cyanosis, or edema  Pulses: radial and femoral pulses are normal  Neuro: HMF intact. Cranial nerves II through XII grossly normal.  Motor: is 5 over 5 and symmetrical.   Deep tendon reflexes: +2 equal  Psych:appropriate behavior, mood, affect and judgement.    Results for orders placed or performed during the hospital encounter of 07/26/22   ECHO ADULT COMPLETE   Result Value Ref Range    IVSd 1.3 (A) 0.6 - 0.9 cm    LVIDd 4.4 3.9 - 5.3 cm    LVIDs 3.1 cm    LVOT Diameter 2.1 cm    LVPWd 1.3 (A) 0.6 - 0.9 cm    RVSP 26 mmHg    LA Diameter 3.5 cm    Est. RA Pressure 10 mmHg    AV Peak Gradient 7 mmHg    AV Peak Velocity 1.3 m/s    MV A Velocity 0.72 m/s    MV E Wave Deceleration Time 218.8 ms    MV E Velocity 0.84 m/s    LV E' Septal Velocity 11 cm/s    Pulmonary Artery EDP 5 mmHg    PA Max Velocity 1.1 m/s    TR Peak Gradient 16 mmHg    TR Max Velocity 2.01 m/s    Aortic Root 2.9 cm    Fractional Shortening 2D 30 28 - 44 %    LV RWT Ratio 0.59     LV Mass 2D 215.1 (A) 67 - 162 g    MV E/A 1.17     E/E' Septal 7.64     LVOT Area 3.5 cm2    LA/AO Root Ratio 1.21        No results found for this visit on 08/04/22. Assessment/ Plan:       ICD-10-CM ICD-9-CM    1. Graves disease  E05.00 242.00       2. Red eyes  H57.89 379.93         We discussed stress reduction. Follow up with endocrinologist -graves     No orders of the defined types were placed in this encounter. Verbal and written instructions (see AVS) provided. Patient expresses understanding of diagnosis and treatment plan.     Health Maintenance Due   Topic Date Due    MICROALBUMIN Q1  Never done    Eye Exam Retinal or Dilated  Never done    Shingrix Vaccine Age 50> (1 of 2) Never done               SPENSER Burnett

## 2022-08-10 RX ORDER — SPIRONOLACTONE 25 MG/1
TABLET ORAL
Qty: 90 TABLET | Refills: 0 | Status: SHIPPED | OUTPATIENT
Start: 2022-08-10

## 2022-09-12 RX ORDER — TOPIRAMATE 25 MG/1
TABLET ORAL
Qty: 90 TABLET | Refills: 0 | Status: SHIPPED | OUTPATIENT
Start: 2022-09-12

## 2022-09-12 NOTE — PROGRESS NOTES
[address], [address]  [phone]  [address] [place], [facility] [address]     Subjective:      Dina Perez, [facility]    Junior Brown is a 49 y.o. female is here for follow up. Pmhx nonobstructive CAD, HTN, HLD, KALPANA, Graves Disease and GERD. Current cigarette smoker--0.5 PPD + family hx CAD (mother and brother)    Referred by pcp for intermittent palpitation. Seen in ED 6/28/2022 for atypical cp with negative cardiac work up. Initially seen by me in 7/6/2022: endorses intermittent heart fluttering 2-3 times a week, lasts a couple of minutes and resolves on its own.  + occasional sob and some left arm pain. Had normal echo stress test and event monitor since    Today    She continues to smoke, 0.5 PPD. States compliance  With PAP and medication therapy. No further palpitation. Feels well    The patient denies chest pain/ shortness of breath, orthopnea, PND, LE edema, palpitations, syncope, presyncope or fatigue.        Patient Active Problem List    Diagnosis Date Noted    Biliary dyskinesia 11/03/2019    Graves disease     Severe obesity ([address] 12/27/2018    Sleep apnea 09/06/2017    Back pain 09/08/2016    Hypothyroidism 04/15/2015    Vertigo 03/17/2014    Pelvic mass 01/07/2014    Abnormal vaginal bleeding 01/07/2014    Migraine headache 10/08/2013    Chronic coronary artery disease 10/01/2013    Hypertension     Vitamin D deficiency     Hypokalemia     Sinusitis 11/04/2011    Overweight 10/17/2011    Benign hypertensive heart disease 09/22/2008      Ryland Fountain NP  Past Medical History:   Diagnosis Date    CAD (coronary artery disease) 10/2013    Colon polyps     Fatty liver     GERD (gastroesophageal reflux disease)     Graves disease     hypothyroid now since procedure    H. pylori infection     Headache     sometimes migraines    Hypertension     Hypokalemia     Menopause     Nausea & vomiting     Pneumothorax ~2012    Sleep apnea     cpap compliant Vitamin D deficiency       Past Surgical History:   Procedure Laterality Date    HX BILATERAL SALPINGO-OOPHORECTOMY      HX CHOLECYSTECTOMY  2019    Lap debo w/cholangiogram    HX COLONOSCOPY      HX ENDOSCOPY      HX HEENT      Radioactive thyroid    HX HYSTERECTOMY      PARTIAL    ID CARDIAC SURG PROCEDURE UNLIST  10-13    CARDIAC CATH     Allergies   Allergen Reactions    Dilaudid [Hydromorphone (Bulk)] Unknown (comments)     Respiratory suppression leading to intubation      Family History   Problem Relation Age of Onset    Kidney Disease Mother     Hypertension Mother     Diabetes Mother     Heart Disease Mother     Cancer Father         Brain    Heart Disease Brother     Hypertension Brother     Diabetes Maternal Grandmother     Cancer Maternal Grandmother     Heart Disease Maternal Grandmother     Hypertension Brother     Hypertension Sister     Diabetes Sister     Hypertension Sister     Hypertension Sister     Glaucoma Brother       Social History     Socioeconomic History    Marital status: SINGLE     Spouse name: Not on file    Number of children: Not on file    Years of education: Not on file    Highest education level: Not on file   Occupational History    Not on file   Tobacco Use    Smoking status: Former     Packs/day: 1.00     Years: 10.00     Pack years: 10.00     Types: Cigarettes     Quit date: 2022     Years since quittin.6    Smokeless tobacco: Never   Vaping Use    Vaping Use: Never used   Substance and Sexual Activity    Alcohol use:  Yes     Alcohol/week: 0.0 standard drinks     Comment: some weekends one or two beers    Drug use: No    Sexual activity: Not on file   Other Topics Concern    Not on file   Social History Narrative    Not on file     Social Determinants of Health     Financial Resource Strain: Medium Risk    Difficulty of Paying Living Expenses: Somewhat hard   Food Insecurity: No Food Insecurity    Worried About Running Out of Food in the Last Year: Never true Ran Out of Food in the Last Year: Never true   Transportation Needs: No Transportation Needs    Lack of Transportation (Medical): No    Lack of Transportation (Non-Medical): No   Physical Activity: Not on file   Stress: Not on file   Social Connections: Not on file   Intimate Partner Violence: Not on file   Housing Stability: Not on file      Current Outpatient Medications   Medication Sig    topiramate (TOPAMAX) 25 mg tablet Take 1 tablet by mouth twice daily    spironolactone (ALDACTONE) 25 mg tablet TAKE 1 TABLET BY MOUTH ONCE DAILY FOR  HYPOKALEMIA  PREVENTION    butalbital-acetaminophen-caffeine (FIORICET, ESGIC) -40 mg per tablet TAKE 1 TABLET BY MOUTH EVERY 6 HOURS AS NEEDED FOR HEADACHE    benazepriL (LOTENSIN) 40 mg tablet TAKE 1 TABLET BY MOUTH ONCE DAILY FOR HIGH BLOOD PRESSURE    pravastatin (PRAVACHOL) 10 mg tablet Take 1 tablet by mouth once daily    cetirizine (ZYRTEC) 10 mg tablet TAKE 1 TABLET BY MOUTH AT BEDTIME AS NEEDED FOR ALLERGIES    cyclobenzaprine (FLEXERIL) 10 mg tablet Take 1 Tablet by mouth nightly. Indications: muscle spasm    amLODIPine (NORVASC) 10 mg tablet Take 1 Tablet by mouth daily. cholecalciferol, vitamin D3, (VITAMIN D3 PO) Take  by mouth. 125 mcg 5000 IU 1 A DAY     triamcinolone acetonide (KENALOG) 0.1 % topical cream Apply  to affected area two (2) times a day. use thin layer (Patient taking differently: Apply  to affected area as needed. use thin layer)    Wheat Dextrin (Benefiber Clear) 3 gram/3.5 gram pwpk Take 1 Each by mouth nightly. (Patient not taking: No sig reported)    diclofenac EC (VOLTAREN) 50 mg EC tablet Take 1 Tab by mouth two (2) times a day. (Patient not taking: No sig reported)    MULTIVITAMIN PO Take  by mouth daily.     omeprazole (PRILOSEC) 20 mg capsule Not a current medication    fluticasone propionate (FLONASE) 50 mcg/actuation nasal spray 2 sprays per nostril per day  Indications: inflammation of the nose due to an allergy (Patient taking differently: 2 sprays per nostril per day PRN  Indications: inflammation of the nose due to an allergy)    levothyroxine (SYNTHROID) 88 mcg tablet Take 1 Tab by mouth Daily (before breakfast). naproxen sodium 220 mg cap Take 220 Caps by mouth daily as needed. 2 a day    aspirin 81 mg chewable tablet Take 1 Tab by mouth daily. No current facility-administered medications for this visit. Review of Symptoms:  11 systems reviewed, negative other than as stated in the HPI    Physical ExamPhysical Exam:    There were no vitals filed for this visit. There is no height or weight on file to calculate BMI. General PE   General:  Well developed, in no acute distress, cooperative and alert  HEENT: No carotid bruits, no JVD, trach is midline. Neck Supple, PEERL, EOM intact. Heart:  reg rate and rhythm; normal S1/S2; no murmurs, gallops or rubs. Respiratory: Clear bilaterally x 4, no wheezing or rales  Abdomen:   Soft, non-tender, no distention, no masses. + BS. Extremities:  Normal cap refill, no cyanosis, atraumatic. No edema. Neuro: A&Ox3, speech clear, gait stable. Skin: Skin color is normal. No rashes or lesions.  Non diaphoretic  Vascular: 2+ pulses symmetric in all extremities    Labs:   Lab Results   Component Value Date/Time    Cholesterol, total 205 (H) 06/23/2022 02:45 PM    Cholesterol, total 211 (H) 10/21/2020 03:47 PM    Cholesterol, total 188 02/18/2019 08:10 AM    Cholesterol, total 183 04/12/2018 09:47 AM    Cholesterol, total 179 11/17/2017 11:29 AM    HDL Cholesterol 62 06/23/2022 02:45 PM    HDL Cholesterol 66 10/21/2020 03:47 PM    HDL Cholesterol 57 02/18/2019 08:10 AM    HDL Cholesterol 65 04/12/2018 09:47 AM    HDL Cholesterol 66 11/17/2017 11:29 AM    LDL, calculated 121 (H) 06/23/2022 02:45 PM    LDL, calculated 126 (H) 10/21/2020 03:47 PM    LDL, calculated 109 (H) 02/18/2019 08:10 AM    LDL, calculated 104 (H) 04/12/2018 09:47 AM    LDL, calculated 102 (H) 11/17/2017 11:29 AM    LDL, calculated 101 (H) 05/20/2016 12:05 PM    Triglyceride 110 06/23/2022 02:45 PM    Triglyceride 109 10/21/2020 03:47 PM    Triglyceride 108 02/18/2019 08:10 AM    Triglyceride 70 04/12/2018 09:47 AM    Triglyceride 57 11/17/2017 11:29 AM    CHOL/HDL Ratio 3.3 06/23/2022 02:45 PM    CHOL/HDL Ratio 3.3 10/13/2013 10:30 AM     No results found for: CPK, CPKX, CPX  Lab Results   Component Value Date/Time    Sodium 143 06/26/2022 08:55 PM    Potassium 3.7 06/26/2022 08:55 PM    Chloride 108 06/26/2022 08:55 PM    CO2 24 06/26/2022 08:55 PM    Anion gap 11 06/26/2022 08:55 PM    Glucose 120 (H) 06/26/2022 08:55 PM    BUN 23 (H) 06/26/2022 08:55 PM    Creatinine 0.99 06/26/2022 08:55 PM    BUN/Creatinine ratio 23 (H) 06/26/2022 08:55 PM    GFR est AA >60 06/26/2022 08:55 PM    GFR est non-AA 59 (L) 06/26/2022 08:55 PM    Calcium 9.1 06/26/2022 08:55 PM    Bilirubin, total 0.2 06/26/2022 08:55 PM    Alk. phosphatase 108 06/26/2022 08:55 PM    Protein, total 7.4 06/26/2022 08:55 PM    Albumin 4.0 06/26/2022 08:55 PM    Globulin 3.4 06/26/2022 08:55 PM    A-G Ratio 1.2 06/26/2022 08:55 PM    ALT (SGPT) 18 06/26/2022 08:55 PM       EKG:  NSR NST changes     Assessment:     Assessment:        ICD-10-CM ICD-9-CM    1. Benign essential HTN  I10 401.1       2. Mixed hyperlipidemia  E78.2 272.2       3. Intermittent palpitations  R00.2 785.1       4. KALPANA (obstructive sleep apnea)  G47.33 327.23       5. Controlled type 2 diabetes mellitus with complication, without long-term current use of insulin (HCC)  E11.8 250.90       6. Current smoker  F17.200 305.1       7. Coronary artery disease due to lipid rich plaque  I25.10 414.00     I25.83 414.3       8. Atypical chest pain  R07.89 786.59       9. Family history of ischemic heart disease (IHD)  Z82.49 V17.3       10. Chronic coronary artery disease  I25.10 414.00           No orders of the defined types were placed in this encounter.     [facility] in 10/15/ 2013  CONCLUSION  1. Mild nonobstructive coronary artery disease in a right dominant system  with preserved left ventricular function. 2.  Normal left ventricular filling pressures. No aortic stenosis. Systemic  arterial pressures are moderately elevated. 3.  Normal left ventricular function with an estimated ejection fraction of  60% and no significant mitral regurgitation. 4.  Right dominant coronary artery system with mild nonobstructive coronary  artery disease. 07/26/22    ECHO ADULT COMPLETE 07/30/2022 7/30/2022    Interpretation Summary  Formatting of this result is different from the original.      Left Ventricle: Normal left ventricular systolic function with a visually estimated EF of 50 - 55%. Left ventricle size is normal. Mildly increased wall thickness. Normal wall motion. Normal diastolic function. Signed by: Zander Vazquez MD on 7/30/2022 11:04 AM    07/18/22    NUCLEAR CARDIAC STRESS TEST 07/19/2022 7/26/2022    Interpretation Summary  Formatting of this result is different from the original.      Stress Test: Patient converted to [facility] due to dyspnea and fatigue on Jose protocol. Patient exercised 6 minutes and 45 seconds and reached a max heart rate of 138. A pharmacological stress test was performed using lexiscan. Hemodynamics are adequate for diagnosis. Blood pressure demonstrated a normal response and heart rate demonstrated a normal response to stress. The patient's heart rate recovery was normal.    ECG: Resting ECG demonstrates normal sinus rhythm. ECG: Stress ECG was negative for ischemia. Nuclear Findings: Normal left ventricular systolic function post-stress. LVEF measures 59%. Nuclear Findings: LV perfusion is probably normal.    Perfusion Defect: There is a left ventricular stress perfusion defect that is small in size present in the mid anterior segment(s) that is fixed. Nuclear Findings: There is normal wall motion in the defect area.  The defect appears to be probable artifact caused by soft tissue. Nuclear Findings: The study is negative for myocardial ischemia. Findings suggest a low risk of myocardial ischemia. Signed by: Dianelys Narvaez MD on 7/19/2022 10:26 PM       Plan:     Coronary artery disease due to lipid rich plaque  Nonobstructive CAD per [facility] in 2013  NST 7/2022: negative for myocardial ischemia. Findings suggest a low risk of myocardial ischemia. On ASA, CCB, statin    Intermittent palpitations  Hx Graves Disease  LVEF 50-55% valves ok per echo in 7/2022  2 week event: occasional PACs/PVCs  TSH ok 6/2022---on Levothyroxine followed by PCP  Will call for any recurrent palpitation, consider low dose BB or Dilt      Benign essential HTN  Controlled with current therapy  Serum Cr 0.99 in 6/2022    Mixed hyperlipidemia  6/2022  on Prava 10 mg Labs and lipids per PCP      KALPANA (obstructive sleep apnea)  On PAP therapy  Followed by pulmonary with [place]--[facility]  Current smoker---tobacco cessation discussed with pt      Patient was made aware during visit today that all testing completed would be instantaneously available on their MyChart for review. Discussed that these results will be made available to the provider at the same time. They were advised to wait at least 3 business days to allow for provider's interpretation of results with follow-up before calling our office with concerns about their results.     Continue current care and f/u in 6 mos      Lilo Covert, NP

## 2022-09-15 ENCOUNTER — OFFICE VISIT (OUTPATIENT)
Dept: CARDIOLOGY CLINIC | Age: 52
End: 2022-09-15
Payer: COMMERCIAL

## 2022-09-15 VITALS
OXYGEN SATURATION: 98 % | HEIGHT: 64 IN | DIASTOLIC BLOOD PRESSURE: 82 MMHG | RESPIRATION RATE: 20 BRPM | WEIGHT: 211 LBS | TEMPERATURE: 97 F | SYSTOLIC BLOOD PRESSURE: 118 MMHG | BODY MASS INDEX: 36.02 KG/M2 | HEART RATE: 79 BPM

## 2022-09-15 DIAGNOSIS — I25.83 CORONARY ARTERY DISEASE DUE TO LIPID RICH PLAQUE: ICD-10-CM

## 2022-09-15 DIAGNOSIS — I10 BENIGN ESSENTIAL HTN: Primary | ICD-10-CM

## 2022-09-15 DIAGNOSIS — R00.2 INTERMITTENT PALPITATIONS: ICD-10-CM

## 2022-09-15 DIAGNOSIS — I25.10 CORONARY ARTERY DISEASE DUE TO LIPID RICH PLAQUE: ICD-10-CM

## 2022-09-15 DIAGNOSIS — F17.200 CURRENT SMOKER: ICD-10-CM

## 2022-09-15 DIAGNOSIS — G47.33 OSA (OBSTRUCTIVE SLEEP APNEA): ICD-10-CM

## 2022-09-15 DIAGNOSIS — E11.8 CONTROLLED TYPE 2 DIABETES MELLITUS WITH COMPLICATION, WITHOUT LONG-TERM CURRENT USE OF INSULIN (HCC): ICD-10-CM

## 2022-09-15 DIAGNOSIS — I25.10 CHRONIC CORONARY ARTERY DISEASE: ICD-10-CM

## 2022-09-15 DIAGNOSIS — Z82.49 FAMILY HISTORY OF ISCHEMIC HEART DISEASE (IHD): ICD-10-CM

## 2022-09-15 DIAGNOSIS — R07.89 ATYPICAL CHEST PAIN: ICD-10-CM

## 2022-09-15 DIAGNOSIS — E78.2 MIXED HYPERLIPIDEMIA: ICD-10-CM

## 2022-09-15 PROCEDURE — 99214 OFFICE O/P EST MOD 30 MIN: CPT | Performed by: NURSE PRACTITIONER

## 2022-09-15 PROCEDURE — 3044F HG A1C LEVEL LT 7.0%: CPT | Performed by: NURSE PRACTITIONER

## 2022-09-15 NOTE — PROGRESS NOTES
Identified pt with two pt identifiers(name and ). Reviewed record in preparation for visit and have obtained necessary documentation. Chief Complaint   Patient presents with    Coronary Artery Disease    Hypertension      Vitals:    09/15/22 1357   BP: 118/82   Pulse: 79   Resp: 20   Temp: 97 °F (36.1 °C)   TempSrc: Temporal   SpO2: 98%   Weight: 211 lb (95.7 kg)   Height: 5' 4\" (1.626 m)   PainSc:   0 - No pain       Medications reviewed/approved by provider. Health Maintenance Review: Patient reminded of \"due or due soon\" health maintenance. I have asked the patient to contact his/her primary care provider (PCP) for follow-up on his/her health maintenance. Coordination of Care Questionnaire:  :   1) Have you been to an emergency room, urgent care, or hospitalized since your last visit? If yes, where when, and reason for visit? no       2. Have seen or consulted any other health care provider since your last visit? If yes, where when, and reason for visit? NO      Patient is accompanied by self I have received verbal consent from Dennis Nichols to discuss any/all medical information while they are present in the room.

## 2022-09-26 ENCOUNTER — OFFICE VISIT (OUTPATIENT)
Dept: FAMILY MEDICINE CLINIC | Age: 52
End: 2022-09-26
Payer: COMMERCIAL

## 2022-09-26 VITALS
OXYGEN SATURATION: 98 % | DIASTOLIC BLOOD PRESSURE: 72 MMHG | HEART RATE: 95 BPM | SYSTOLIC BLOOD PRESSURE: 120 MMHG | WEIGHT: 210.6 LBS | RESPIRATION RATE: 20 BRPM | HEIGHT: 64 IN | TEMPERATURE: 98.1 F | BODY MASS INDEX: 35.96 KG/M2

## 2022-09-26 DIAGNOSIS — L25.5 CONTACT DERMATITIS DUE TO PLANT: Primary | ICD-10-CM

## 2022-09-26 PROCEDURE — 99214 OFFICE O/P EST MOD 30 MIN: CPT | Performed by: NURSE PRACTITIONER

## 2022-09-26 PROCEDURE — 96372 THER/PROPH/DIAG INJ SC/IM: CPT | Performed by: NURSE PRACTITIONER

## 2022-09-26 RX ORDER — METHYLPREDNISOLONE 4 MG/1
TABLET ORAL
Qty: 1 DOSE PACK | Refills: 0 | Status: SHIPPED | OUTPATIENT
Start: 2022-09-26

## 2022-09-26 RX ORDER — DEXAMETHASONE SODIUM PHOSPHATE 4 MG/ML
4 INJECTION, SOLUTION INTRA-ARTICULAR; INTRALESIONAL; INTRAMUSCULAR; INTRAVENOUS; SOFT TISSUE ONCE
Status: COMPLETED | OUTPATIENT
Start: 2022-09-26 | End: 2022-09-26

## 2022-09-26 RX ORDER — CALCIUM ACETATE/ALUMINUM SULF
1 TABLET, EFFERVESCENT TOPICAL 3 TIMES DAILY
Qty: 12 EACH | Refills: 0 | Status: SHIPPED | OUTPATIENT
Start: 2022-09-26

## 2022-09-26 RX ORDER — METHYLPREDNISOLONE SODIUM SUCCINATE 40 MG/ML
40 INJECTION, POWDER, LYOPHILIZED, FOR SOLUTION INTRAMUSCULAR; INTRAVENOUS ONCE
Status: COMPLETED | OUTPATIENT
Start: 2022-09-26 | End: 2022-09-26

## 2022-09-26 RX ADMIN — METHYLPREDNISOLONE SODIUM SUCCINATE 40 MG: 40 INJECTION, POWDER, LYOPHILIZED, FOR SOLUTION INTRAMUSCULAR; INTRAVENOUS at 15:03

## 2022-09-26 RX ADMIN — DEXAMETHASONE SODIUM PHOSPHATE 4 MG: 4 INJECTION, SOLUTION INTRA-ARTICULAR; INTRALESIONAL; INTRAMUSCULAR; INTRAVENOUS; SOFT TISSUE at 15:02

## 2022-09-26 NOTE — PROGRESS NOTES
Chief Complaint   Patient presents with    Rash       HPI:     is a 47 y.o. female int jon with a CC of a rash to bilateral forearms since last week. Rash is hot, itchy. She tried calamine lotion with little relief. She works outside but denies any known exposure with poison ivy. Allergies   Allergen Reactions    Dilaudid [Hydromorphone (Bulk)] Unknown (comments)     Respiratory suppression leading to intubation       Current Outpatient Medications   Medication Sig    calcium acetate-aluminum sulf ([place]) 952-1,347 mg packet Apply 1 Packet to affected area three (3) times daily. methylPREDNISolone (MEDROL DOSEPACK) 4 mg tablet Take as directed. amLODIPine (NORVASC) 10 mg tablet Take 1 tablet by mouth once daily    topiramate (TOPAMAX) 25 mg tablet Take 1 tablet by mouth twice daily    spironolactone (ALDACTONE) 25 mg tablet TAKE 1 TABLET BY MOUTH ONCE DAILY FOR  HYPOKALEMIA  PREVENTION    butalbital-acetaminophen-caffeine (FIORICET, ESGIC) -40 mg per tablet TAKE 1 TABLET BY MOUTH EVERY 6 HOURS AS NEEDED FOR HEADACHE    benazepriL (LOTENSIN) 40 mg tablet TAKE 1 TABLET BY MOUTH ONCE DAILY FOR HIGH BLOOD PRESSURE    pravastatin (PRAVACHOL) 10 mg tablet Take 1 tablet by mouth once daily    cetirizine (ZYRTEC) 10 mg tablet TAKE 1 TABLET BY MOUTH AT BEDTIME AS NEEDED FOR ALLERGIES    cholecalciferol, vitamin D3, (VITAMIN D3 PO) Take  by mouth. 125 mcg 5000 IU 1 A DAY     MULTIVITAMIN PO Take  by mouth daily. fluticasone propionate (FLONASE) 50 mcg/actuation nasal spray 2 sprays per nostril per day  Indications: inflammation of the nose due to an allergy    levothyroxine (SYNTHROID) 88 mcg tablet Take 1 Tab by mouth Daily (before breakfast). naproxen sodium 220 mg cap Take 220 Caps by mouth daily as needed. 2 a day    aspirin 81 mg chewable tablet Take 1 Tab by mouth daily.      Current Facility-Administered Medications   Medication    dexamethasone (DECADRON) 4 mg/mL injection 4 mg methylPREDNISolone (SOLU-MEDROL) injection 40 mg       Past Medical History:   Diagnosis Date    CAD (coronary artery disease) 10/2013    Colon polyps     Fatty liver     GERD (gastroesophageal reflux disease)     Graves disease     hypothyroid now since procedure    H. pylori infection     Headache     sometimes migraines    Hypertension     Hypokalemia     Menopause     Nausea & vomiting     Pneumothorax ~2012    Sleep apnea     cpap compliant    Vitamin D deficiency        Family History   Problem Relation Age of Onset    Kidney Disease Mother     Hypertension Mother     Diabetes Mother     Heart Disease Mother     Cancer Father         Brain    Heart Disease Brother     Hypertension Brother     Diabetes Maternal Grandmother     Cancer Maternal Grandmother     Heart Disease Maternal Grandmother     Hypertension Brother     Hypertension Sister     Diabetes Sister     Hypertension Sister     Hypertension Sister     Glaucoma Brother          Review of Systems    A comprehensive review of systems was negative except for that written in the HPI. Patient is not experiencing chest pain radiating to the jaw and/or down the arms. Physical Examination:    /72 (BP 1 Location: Right arm, BP Patient Position: Sitting, BP Cuff Size: Adult)   Pulse 95   Temp 98.1 °F (36.7 °C) (Temporal)   Resp 20   Ht 5' 4\" (1.626 m)   Wt 210 lb 9.6 oz (95.5 kg)   SpO2 98%   BMI 36.15 kg/m²     Wt Readings from Last 3 Encounters:   09/26/22 210 lb 9.6 oz (95.5 kg)   09/15/22 211 lb (95.7 kg)   08/04/22 210 lb (95.3 kg)       Constitutional: WDWN Female in no acute distress  HENT:  NC/AT  Respiratory:  Respirations even and unlabored without use of accessory muscles, CTA throughout without wheezes, rales, rubs or rhonchi. Symmetrical chest expansion. Cardiac: RRR no clicks, murmurs, gallops, or rubs  Musculoskeletal:  No cyanosis, clubbing or edema of extremities. Moves all extremities without difficulty.    Neurologic: Smooth, even gait without assistance, CN 2-12 grossly intact. Skin: confluent erythematous papular rash to bilateral forearms  Lymphadenopathy: no cervical or supraclavicular nodes  Psych: Pleasant and appropriate. Judgment normal. Alert and oriented x 3. ASSESSMENT AND PLAN:       ICD-10-CM ICD-9-CM    1. Contact dermatitis due to plant  L25.5 692.6 dexamethasone (DECADRON) 4 mg/mL injection 4 mg      methylPREDNISolone (SOLU-MEDROL) injection 40 mg      calcium acetate-aluminum sulf ([place]) 952-1,347 mg packet      methylPREDNISolone (MEDROL DOSEPACK) 4 mg tablet        Hold oral steroids for 24-48 hours and take if rash doesn't improve. Medication Side Effects and Warnings were discussed with patient:  yes  Patient Labs were reviewed:  NA  Patient Past Records were reviewed:  yes    Patient aware of plan of care and verbalized understanding. Questions answered. RTC PRN.     Alicia Delgado NP

## 2022-09-26 NOTE — PROGRESS NOTES
1. \"Have you been to the ER, urgent care clinic since your last visit? Hospitalized since your last visit? \" No    2. \"Have you seen or consulted any other health care providers outside of the [address] [place] [address] since your last visit? \" No     3. For patients aged 39-70: Has the patient had a colonoscopy / FIT/ Cologuard? Yes - Care Gap present. Most recent result on file      If the patient is female:    4. For patients aged 41-77: Has the patient had a mammogram within the past 2 years? Yes - Care Gap present. Most recent result on file      5. For patients aged 21-65: Has the patient had a pap smear? Yes - Care Gap present.  Most recent result on file

## 2022-10-03 ENCOUNTER — TELEPHONE (OUTPATIENT)
Dept: FAMILY MEDICINE CLINIC | Age: 52
End: 2022-10-03

## 2022-10-03 NOTE — TELEPHONE ENCOUNTER
Pt states she went to [facility] last night and she received a negative covid test. Has had 2 positive at home covid tests and another after coming home from [facility]. She was prescribed paxlovid and wants to know if it is ok to take this med with her other meds that have been prescribed already.

## 2022-10-17 RX ORDER — BUTALBITAL, ACETAMINOPHEN AND CAFFEINE 50; 325; 40 MG/1; MG/1; MG/1
TABLET ORAL
Qty: 30 TABLET | Refills: 0 | Status: SHIPPED | OUTPATIENT
Start: 2022-10-17

## 2022-10-17 NOTE — TELEPHONE ENCOUNTER
Message routed to provider to approve refill.     Requested Prescriptions     Pending Prescriptions Disp Refills    butalbital-acetaminophen-caffeine (FIORICET, ESGIC) -40 mg per tablet 30 Tablet 0     Sig: TAKE 1 TABLET BY MOUTH EVERY 6 HOURS AS NEEDED FOR HEADACHE
80 yo F PMH CAD s/p stent to LAD, AS s/p TAVR, PPM, DVT (Jan '18) on coumadin, IVC filter, pyoderma gangrenosum, hx MRSA infections, and total knee replacement c/b joint infections s/p I&D explantation and spacer placement followed by spacer removal and static spacer placement with joint fusion due to infection of initial spacer with complex wound closure by plastic surgery, admission here in November 2018 for hip and thigh wounds, cont'd...

## 2022-11-03 ENCOUNTER — VIRTUAL VISIT (OUTPATIENT)
Dept: FAMILY MEDICINE CLINIC | Age: 52
End: 2022-11-03
Payer: COMMERCIAL

## 2022-11-03 ENCOUNTER — TELEPHONE (OUTPATIENT)
Dept: FAMILY MEDICINE CLINIC | Age: 52
End: 2022-11-03

## 2022-11-03 DIAGNOSIS — R68.89 FLU-LIKE SYMPTOMS: Primary | ICD-10-CM

## 2022-11-03 LAB
FLUAV+FLUBV AG NOSE QL IA.RAPID: NEGATIVE
FLUAV+FLUBV AG NOSE QL IA.RAPID: POSITIVE
S PYO AG THROAT QL: NEGATIVE
VALID INTERNAL CONTROL?: YES
VALID INTERNAL CONTROL?: YES

## 2022-11-03 PROCEDURE — 87804 INFLUENZA ASSAY W/OPTIC: CPT | Performed by: NURSE PRACTITIONER

## 2022-11-03 PROCEDURE — 99214 OFFICE O/P EST MOD 30 MIN: CPT | Performed by: NURSE PRACTITIONER

## 2022-11-03 PROCEDURE — 87880 STREP A ASSAY W/OPTIC: CPT | Performed by: NURSE PRACTITIONER

## 2022-11-03 RX ORDER — OSELTAMIVIR PHOSPHATE 75 MG/1
75 CAPSULE ORAL 2 TIMES DAILY
Qty: 10 CAPSULE | Refills: 0 | Status: SHIPPED | OUTPATIENT
Start: 2022-11-03 | End: 2022-11-08

## 2022-11-03 NOTE — PROGRESS NOTES
Nakia Machado is a 47 y.o. female, evaluated via audio-only technology on 11/3/2022 for Cough and Nasal Discharge (Clear off and on with head congestion, scratchy throat, but not sore, hot and cold spells off and on/)  . Patient endorses headache, sore throat, cough, sneezing and shortness of breath since Monday. She has tried her allergy medication and resting with minimal relief. Her granddaughter is also sick with similar symptoms who is currently seeing her doctor as well. Her symptoms are worse at night. Denies fevers. She took a Covid test at home last night which was negative. Assessment & Plan:   Diagnoses and all orders for this visit:    1. Flu-like symptoms  -     AMB POC BAUTISTA INFLUENZA A/B TEST  -     AMB POC RAPID STREP A    Follow-up and Dispositions    Return if symptoms worsen or fail to improve. 12  Subjective:       Prior to Admission medications    Medication Sig Start Date End Date Taking? Authorizing Provider   butalbital-acetaminophen-caffeine (FIORICET, ESGIC) -40 mg per tablet TAKE 1 TABLET BY MOUTH EVERY 6 HOURS AS NEEDED FOR HEADACHE 10/17/22   Chente Mcnally NP   calcium acetate-aluminum sulf ([place]) 952-1,347 mg packet Apply 1 Packet to affected area three (3) times daily. 9/26/22   Therese Shirley NP   methylPREDNISolone (MEDROL DOSEPACK) 4 mg tablet Take as directed.  9/26/22   Therese Shirley NP   amLODIPine (NORVASC) 10 mg tablet Take 1 tablet by mouth once daily 9/22/22   Chente Mcnally NP   topiramate (TOPAMAX) 25 mg tablet Take 1 tablet by mouth twice daily 9/12/22   Chente Mcnally NP   spironolactone (ALDACTONE) 25 mg tablet TAKE 1 TABLET BY MOUTH ONCE DAILY FOR  HYPOKALEMIA  PREVENTION 8/10/22   Therese Shirley NP   benazepriL (LOTENSIN) 40 mg tablet TAKE 1 TABLET BY MOUTH ONCE DAILY FOR HIGH BLOOD PRESSURE 6/25/22   Chente Mcnally NP   pravastatin (PRAVACHOL) 10 mg tablet Take 1 tablet by mouth once daily 5/9/22   Chente Mcnally NP cetirizine (ZYRTEC) 10 mg tablet TAKE 1 TABLET BY MOUTH AT BEDTIME AS NEEDED FOR ALLERGIES 2/14/22   Charles Pratt NP   cholecalciferol, vitamin D3, (VITAMIN D3 PO) Take  by mouth. 125 mcg 5000 IU 1 A DAY     Provider, Historical   MULTIVITAMIN PO Take  by mouth daily. Provider, Historical   fluticasone propionate (FLONASE) 50 mcg/actuation nasal spray 2 sprays per nostril per day  Indications: inflammation of the nose due to an allergy 4/1/19   Charles Pratt NP   levothyroxine (SYNTHROID) 88 mcg tablet Take 1 Tab by mouth Daily (before breakfast). 2/18/19   Charles Pratt NP   naproxen sodium 220 mg cap Take 220 Caps by mouth daily as needed. 2 a day    Provider, Historical   aspirin 81 mg chewable tablet Take 1 Tab by mouth daily. 10/14/13   Fabrizio Moon MD     Patient Active Problem List   Diagnosis Code    Hypertension I10    Vitamin D deficiency E55.9    Hypokalemia E87.6    Migraine headache G43.909    Chronic coronary artery disease I25.10    Pelvic mass R19.00    Abnormal vaginal bleeding N93.9    Hypothyroidism E03.9    Vertigo R42    Severe obesity (HCC) E66.01    Graves disease E05.00    Biliary dyskinesia K82.8    Back pain M54.9    Benign hypertensive heart disease I11.9    Overweight E66.3    Sinusitis J32.9    Sleep apnea G47.30     Current Outpatient Medications   Medication Sig Dispense Refill    butalbital-acetaminophen-caffeine (FIORICET, ESGIC) -40 mg per tablet TAKE 1 TABLET BY MOUTH EVERY 6 HOURS AS NEEDED FOR HEADACHE 30 Tablet 0    calcium acetate-aluminum sulf ([place]) 952-1,347 mg packet Apply 1 Packet to affected area three (3) times daily. 12 Each 0    methylPREDNISolone (MEDROL DOSEPACK) 4 mg tablet Take as directed.  1 Dose Pack 0    amLODIPine (NORVASC) 10 mg tablet Take 1 tablet by mouth once daily 90 Tablet 0    topiramate (TOPAMAX) 25 mg tablet Take 1 tablet by mouth twice daily 90 Tablet 0    spironolactone (ALDACTONE) 25 mg tablet TAKE 1 TABLET BY MOUTH ONCE DAILY FOR  HYPOKALEMIA  PREVENTION 90 Tablet 0    benazepriL (LOTENSIN) 40 mg tablet TAKE 1 TABLET BY MOUTH ONCE DAILY FOR HIGH BLOOD PRESSURE 90 Tablet 0    pravastatin (PRAVACHOL) 10 mg tablet Take 1 tablet by mouth once daily 90 Tablet 0    cetirizine (ZYRTEC) 10 mg tablet TAKE 1 TABLET BY MOUTH AT BEDTIME AS NEEDED FOR ALLERGIES 30 Tablet 5    cholecalciferol, vitamin D3, (VITAMIN D3 PO) Take  by mouth. 125 mcg 5000 IU 1 A DAY       MULTIVITAMIN PO Take  by mouth daily. fluticasone propionate (FLONASE) 50 mcg/actuation nasal spray 2 sprays per nostril per day  Indications: inflammation of the nose due to an allergy 1 Bottle 3    levothyroxine (SYNTHROID) 88 mcg tablet Take 1 Tab by mouth Daily (before breakfast). 90 Tab 4    naproxen sodium 220 mg cap Take 220 Caps by mouth daily as needed. 2 a day      aspirin 81 mg chewable tablet Take 1 Tab by mouth daily. 30 Tab 11       ROS-All pertinent information in HPI    Patient-Reported LMP: Hysterectomy       Geetha Saldaña was evaluated through a patient-initiated, synchronous (real-time) audio only encounter. She (or guardian if applicable) is aware that it is a billable service, which includes applicable co-pays, with coverage as determined by her insurance carrier. This visit was conducted with the patient's (and/or Venda Shove guardian's) verbal consent. She has not had a related appointment within my department in the past 7 days or scheduled within the next 24 hours. The patient was located in a state where the provider was licensed to provide care. The patient was located at: Home: [address] [place] [address]  The provider was located at:  Facility (Appt Department): [id number] [id number]    Total Time: minutes: 21-30 minutes    Virgle Aase, NP

## 2022-11-03 NOTE — LETTER
NOTIFICATION RETURN TO WORK     11/3/2022 5:13 PM    Ms. [address]  [facility]      To Whom It May Concern:    Sid Aguiar is currently under the care of Dinh Gaviria. She will return to work on: 11/08/2022    If there are questions or concerns please have the patient contact our office.         Sincerely,      Elsa Jefferson NP

## 2022-11-03 NOTE — PROGRESS NOTES
Chief Complaint   Patient presents with    Cough    Nasal Discharge     Clear off and on with head congestion, scratchy throat, but not sore, hot and cold spells off and on       1. \"Have you been to the ER, urgent care clinic since your last visit? N0 Hospitalized since your last visit? \" No    2. \"Have you seen or consulted any other health care providers outside of the [address] [place] [address] since your last visit? \" No     3. For patients aged 39-70: Has the patient had a colonoscopy / FIT/ Cologuard? No      If the patient is female:    4. For patients aged 41-77: Has the patient had a mammogram within the past 2 years? Yes - no Care Gap present      5. For patients aged 21-65: Has the patient had a pap smear?  No, Hysterectomy

## 2022-11-03 NOTE — TELEPHONE ENCOUNTER
ROBERTO Ballesteros Bruce Ralph, per Dmitriy Murray, she was informed of her lab results, Strep - Neg, Flu -Pos A and can call in Flu medication if she likes. She stated OK of understanding and thanks. Per Ms Bruce Rosas yes, the medication can be called into [facility].

## 2022-11-14 ENCOUNTER — CLINICAL SUPPORT (OUTPATIENT)
Dept: FAMILY MEDICINE CLINIC | Age: 52
End: 2022-11-14
Payer: COMMERCIAL

## 2022-11-14 DIAGNOSIS — Z11.1 PPD SCREENING TEST: Primary | ICD-10-CM

## 2022-11-14 PROCEDURE — 86580 TB INTRADERMAL TEST: CPT | Performed by: NURSE PRACTITIONER

## 2022-11-14 NOTE — PROGRESS NOTES
PPD Placement note  Debbie Poster, 47 y.o. female is here today for placement of PPD test  Reason for PPD test: JOB RELATED  Pt taken PPD test before: yes  Verified in allergy area and with patient that they are not allergic to the products PPD is made of (Phenol or Tween). Yes  Is patient taking any oral or IV steroid medication now or have they taken it in the last month? no  Has the patient ever received the BCG vaccine?: no  Has the patient been in recent contact with anyone known or suspected of having active TB disease?: no       Date of exposure (if applicable): N/A       Name of person they were exposed to (if applicable): N/A  Patient's Country of origin?: [place]  O: Alert and oriented in NAD. P:  PPD placed on 11/14/2022. Patient advised to return for reading within 48-72 hours.      LOT: [id number]  EXP:03/01/2024

## 2022-11-15 RX ORDER — BENAZEPRIL HYDROCHLORIDE 40 MG/1
TABLET ORAL
Qty: 90 TABLET | Refills: 1 | Status: SHIPPED | OUTPATIENT
Start: 2022-11-15

## 2022-11-15 RX ORDER — TOPIRAMATE 25 MG/1
25 TABLET ORAL 2 TIMES DAILY
Qty: 90 TABLET | Refills: 1 | Status: SHIPPED | OUTPATIENT
Start: 2022-11-15

## 2022-11-15 RX ORDER — AMLODIPINE BESYLATE 10 MG/1
10 TABLET ORAL DAILY
Qty: 90 TABLET | Refills: 1 | Status: SHIPPED | OUTPATIENT
Start: 2022-11-15

## 2022-11-16 LAB
MM INDURATION POC: 0 MM (ref 0–5)
PPD POC: NEGATIVE NEGATIVE

## 2022-11-16 NOTE — PROGRESS NOTES
PPD Reading Note  PPD read and results entered in [facility] [employer]. Result: 0 mm induration.   Interpretation: negative  If test not read within 48-72 hours of initial placement, patient advised to repeat in other arm 1-3 weeks after this test.  Allergic reaction: no

## 2022-11-21 ENCOUNTER — TELEPHONE (OUTPATIENT)
Dept: FAMILY MEDICINE CLINIC | Age: 52
End: 2022-11-21

## 2022-11-21 DIAGNOSIS — E78.5 HYPERLIPIDEMIA, UNSPECIFIED HYPERLIPIDEMIA TYPE: ICD-10-CM

## 2022-11-21 RX ORDER — SPIRONOLACTONE 25 MG/1
TABLET ORAL
Qty: 90 TABLET | Refills: 1 | Status: SHIPPED | OUTPATIENT
Start: 2022-11-21

## 2022-11-21 RX ORDER — PRAVASTATIN SODIUM 10 MG/1
10 TABLET ORAL DAILY
Qty: 90 TABLET | Refills: 1 | Status: SHIPPED | OUTPATIENT
Start: 2022-11-21

## 2022-11-21 NOTE — TELEPHONE ENCOUNTER
Ms Rio Martinez was called, informed of her other requested Rx's has been sent in via EMR per Lalo oHlloway.

## 2022-11-21 NOTE — TELEPHONE ENCOUNTER
Pt states that when she picked up the her printed prescriptions it was only for amlodipine, topiramate, and benazepril. She still does not have pravastatin or spironolactone. Can be called into [facility] if possible. Please advise pt when done.

## 2022-12-01 RX ORDER — BUTALBITAL, ACETAMINOPHEN AND CAFFEINE 50; 325; 40 MG/1; MG/1; MG/1
TABLET ORAL
Qty: 30 TABLET | Refills: 0 | Status: SHIPPED | OUTPATIENT
Start: 2022-12-01

## 2022-12-22 RX ORDER — ENALAPRIL MALEATE 20 MG/1
20 TABLET ORAL DAILY
Qty: 90 TABLET | Refills: 1 | Status: SHIPPED | OUTPATIENT
Start: 2022-12-22

## 2023-02-27 ENCOUNTER — OFFICE VISIT (OUTPATIENT)
Dept: FAMILY MEDICINE CLINIC | Age: 53
End: 2023-02-27
Payer: COMMERCIAL

## 2023-02-27 VITALS
OXYGEN SATURATION: 98 % | RESPIRATION RATE: 20 BRPM | DIASTOLIC BLOOD PRESSURE: 80 MMHG | SYSTOLIC BLOOD PRESSURE: 122 MMHG | BODY MASS INDEX: 36.37 KG/M2 | TEMPERATURE: 97 F | WEIGHT: 213 LBS | HEIGHT: 64 IN | HEART RATE: 76 BPM

## 2023-02-27 DIAGNOSIS — E78.5 HYPERLIPIDEMIA, UNSPECIFIED HYPERLIPIDEMIA TYPE: ICD-10-CM

## 2023-02-27 DIAGNOSIS — I10 ESSENTIAL HYPERTENSION: ICD-10-CM

## 2023-02-27 PROCEDURE — 3074F SYST BP LT 130 MM HG: CPT | Performed by: NURSE PRACTITIONER

## 2023-02-27 PROCEDURE — 99214 OFFICE O/P EST MOD 30 MIN: CPT | Performed by: NURSE PRACTITIONER

## 2023-02-27 PROCEDURE — 3078F DIAST BP <80 MM HG: CPT | Performed by: NURSE PRACTITIONER

## 2023-02-27 NOTE — Clinical Note
Please send a letter or call Ms. Donnie Harris regarding an appointment in 4 months.   It was the end of the day last appointment

## 2023-02-27 NOTE — PROGRESS NOTES
Chief Complaint   Patient presents with    Weight Loss     YES Answers must have Comments  1. \"Have you been to the ER, urgent care clinic since your last visit? Hospitalized since your last visit? \"    [] YES   [x] NO       2. Have you seen or consulted any other health care providers outside of [address] [place] [address] since your last visit?     [x] YES  thyroid, DR Marcy Huitron last Monday  [] NO       3. For patients aged 39-70: Have you had a colorectal cancer screening such as a colonoscopy/FIT/Cologuard? Nurse/CMA to request records if not in chart   [x] YES  10- [facility]  [] NO   [] NA, based on age    If the patient is female:      3. For female patients aged 41-77: Nicholas Carvalho you had a mammogram in the last two years?  Nurse/CMA to request records if not in chart   [x] YES  6- , Barrera Ennis  [] NO   [] NA, based on age    11. For female patients aged 21-65: Nicholas Carvalho you had a pap smear?   Nurse/CMA to request records if not in chart   [] YES   [] NO  [x] NA, hysterectomy

## 2023-02-28 NOTE — PROGRESS NOTES
Subjective:     Jayden Piña is a 47 y.o. female who presents today with the following:  Chief Complaint   Patient presents with    Weight Loss       Patient Active Problem List   Diagnosis Code    Hypertension I10    Vitamin D deficiency E55.9    Hypokalemia E87.6    Migraine headache G43.909    Chronic coronary artery disease I25.10    Pelvic mass R19.00    Abnormal vaginal bleeding N93.9    Hypothyroidism E03.9    Vertigo R42    Severe obesity (HCC) E66.01    Graves disease E05.00    Biliary dyskinesia K82.8    Back pain M54.9    Benign hypertensive heart disease I11.9    Overweight E66.3    Sinusitis J32.9    Sleep apnea G47.30         COMPLIANT WITH MEDICATION:   HTN; Denies chest pain, dyspnea, palpitations, headache and blurred vision. Blood pressure normotensive. ontinue Continue   enalapril (VASOTEC) 20 mg tablet, Take 1 Tablet by mouth daily. Indications: high blood pressure, Disp: 90 Tablet, Rfl: 1    spironolactone (ALDACTONE) 25 mg tablet, TAKE 1 TABLET BY MOUTH ONCE DAILY FOR  HYPOKALEMIA  PREVENTION, Disp: 90 Tablet, Rfl: 1    pravastatin (PRAVACHOL) 10 mg tablet, Take 1 Tablet by mouth daily. , Disp: 90 Tablet, Rfl: 1    amLODIPine (NORVASC) 10 mg tablet, Take 1 Tablet by mouth daily. , Disp: 90 Tablet, Rfl: 1       BMI;  Ms. Kylie Doe was prescribed Wegovy We discussed  medication and demonstration with  practice Wegovy pen with no needle. Discussed the patient's BMI with her. The BMI follow up plan is as follows:     dietary management education, guidance, and counseling  encourage exercise  monitor weight  prescribed dietary intake    An After Visit Summary was printed and given to the patient. depression screening addressed not at risk    abuse screening addressed denies    learning assessment addressed reviewed nurses notes    fall risk addressed not at risk    HM: addressed She declines HM care gaps at this time .  She asked for advice for smoking cessation and was provided information for quit now Arvil Alt. ROS:  Gen: denies fever, chills, fatigue, weight loss, weight gain  HEENT:denies blurry vision, nasal congestion, sore throat  Resp: denies dypsnea, cough, wheezing  CV: denies chest pain radiating to the jaws or arms, palpitations, lower extremity edema  Abd: denies nausea, vomiting, diarrhea, constipation  Neuro: denies numbness/tingling  Endo: denies polyuria, polydipsia, heat/cold intolerance  Heme: no lymphadenopathy    Allergies   Allergen Reactions    Dilaudid [Hydromorphone (Bulk)] Unknown (comments)     Respiratory suppression leading to intubation         Current Outpatient Medications:     butalbital-acetaminophen-caffeine (FIORICET, ESGIC) -40 mg per tablet, TAKE 1 TABLET BY MOUTH EVERY 6 HOURS AS NEEDED FOR HEADACHE, Disp: 30 Tablet, Rfl: 0    cetirizine (ZYRTEC) 10 mg tablet, TAKE 1 TABLET BY MOUTH AT BEDTIME AS NEEDED FOR ALLERGIES, Disp: 30 Tablet, Rfl: 0    enalapril (VASOTEC) 20 mg tablet, Take 1 Tablet by mouth daily. Indications: high blood pressure, Disp: 90 Tablet, Rfl: 1    spironolactone (ALDACTONE) 25 mg tablet, TAKE 1 TABLET BY MOUTH ONCE DAILY FOR  HYPOKALEMIA  PREVENTION, Disp: 90 Tablet, Rfl: 1    pravastatin (PRAVACHOL) 10 mg tablet, Take 1 Tablet by mouth daily. , Disp: 90 Tablet, Rfl: 1    amLODIPine (NORVASC) 10 mg tablet, Take 1 Tablet by mouth daily. , Disp: 90 Tablet, Rfl: 1    topiramate (TOPAMAX) 25 mg tablet, Take 1 Tablet by mouth two (2) times a day., Disp: 90 Tablet, Rfl: 1    calcium acetate-aluminum sulf ([place]) 952-1,347 mg packet, Apply 1 Packet to affected area three (3) times daily. , Disp: 12 Each, Rfl: 0    methylPREDNISolone (MEDROL DOSEPACK) 4 mg tablet, Take as directed., Disp: 1 Dose Pack, Rfl: 0    cholecalciferol, vitamin D3, (VITAMIN D3 PO), Take  by mouth. 125 mcg 5000 IU 1 A DAY , Disp: , Rfl:     MULTIVITAMIN PO, Take  by mouth daily. , Disp: , Rfl:     fluticasone propionate (FLONASE) 50 mcg/actuation nasal spray, 2 sprays per nostril per day  Indications: inflammation of the nose due to an allergy, Disp: 1 Bottle, Rfl: 3    levothyroxine (SYNTHROID) 88 mcg tablet, Take 1 Tab by mouth Daily (before breakfast). , Disp: 90 Tab, Rfl: 4    naproxen sodium 220 mg cap, Take 220 Caps by mouth daily as needed. 2 a day, Disp: , Rfl:     aspirin 81 mg chewable tablet, Take 1 Tab by mouth daily. , Disp: 30 Tab, Rfl: 11    Past Medical History:   Diagnosis Date    CAD (coronary artery disease) 10/2013    Colon polyps     Fatty liver     GERD (gastroesophageal reflux disease)     Graves disease     hypothyroid now since procedure    H. pylori infection     Headache     sometimes migraines    Hypertension     Hypokalemia     Menopause     Nausea & vomiting     Pneumothorax ~    Sleep apnea     cpap compliant    Vitamin D deficiency        Past Surgical History:   Procedure Laterality Date    HX BILATERAL SALPINGO-OOPHORECTOMY      HX CHOLECYSTECTOMY  2019    Lap debo w/cholangiogram    HX COLONOSCOPY      HX ENDOSCOPY      HX HEENT      Radioactive thyroid    HX HYSTERECTOMY      PARTIAL    AL UNLISTED PROCEDURE CARDIAC SURGERY  10-13    CARDIAC CATH       Social History     Tobacco Use   Smoking Status Every Day    Packs/day: 0.50    Years: 10.00    Pack years: 5.00    Types: Cigarettes    Last attempt to quit: 2022    Years since quittin.0   Smokeless Tobacco Never       Social History     Socioeconomic History    Marital status: SINGLE   Tobacco Use    Smoking status: Every Day     Packs/day: 0.50     Years: 10.00     Pack years: 5.00     Types: Cigarettes     Last attempt to quit: 2022     Years since quittin.0    Smokeless tobacco: Never   Vaping Use    Vaping Use: Never used   Substance and Sexual Activity    Alcohol use:  Yes     Alcohol/week: 0.0 standard drinks     Comment: some weekends one or two beers    Drug use: No     Social Determinants of Health     Financial Resource Strain: Medium Risk    Difficulty of Paying Living Expenses: Somewhat hard   Food Insecurity: No Food Insecurity    Worried About Running Out of Food in the Last Year: Never true    Ran Out of Food in the Last Year: Never true   Transportation Needs: No Transportation Needs    Lack of Transportation (Medical): No    Lack of Transportation (Non-Medical): No       Family History   Problem Relation Age of Onset    Kidney Disease Mother     Hypertension Mother     Diabetes Mother     Heart Disease Mother     Cancer Father         Brain    Heart Disease Brother     Hypertension Brother     Diabetes Maternal Grandmother     Cancer Maternal Grandmother     Heart Disease Maternal Grandmother     Hypertension Brother     Hypertension Sister     Diabetes Sister     Hypertension Sister     Hypertension Sister     Glaucoma Brother          Objective:     Visit Vitals  /80 (BP 1 Location: Right upper arm, BP Patient Position: Sitting, BP Cuff Size: Large adult)   Pulse 76   Temp 97 °F (36.1 °C) (Temporal)   Resp 20   Ht 5' 4\" (1.626 m)   Wt 213 lb (96.6 kg)   SpO2 98%   BMI 36.56 kg/m²     Body mass index is 36.56 kg/m². General: Alert and oriented. No acute distress. Well nourished  HEENT :  Ears:TMs are normal. Canals are clear. Eyes: pupils equal, round, react to light and accommodation. Extra ocular movements intact. Nose: patent. Mouth and throat is clear. Neck:supple full range of motion no thyromegaly. Trachea midline, No carotid bruits. No significant lymphadenopathy  Lungs[de-identified] clear to auscultation without wheezes, rales, or rhonchi. Heart :RRR, S1 & S2 are normal intensity. No murmur; no gallop  Abdomen: bowel sounds active. No tenderness, guarding, rebound, masses, hepatic or spleen enlargement  Back: no CVA tenderness. Extremities: without clubbing, cyanosis, or edema  Pulses: radial and femoral pulses are normal  Neuro: HMF intact.  Cranial nerves II through XII grossly normal.  Motor: is 5 over 5 and symmetrical.   Deep tendon reflexes: +2 equal  Psych:appropriate behavior, mood, affect and judgement. Results for orders placed or performed in visit on 11/14/22   AMB POC TUBERCULOSIS, INTRADERMAL (SKIN TEST)   Result Value Ref Range    PPD Negative Negative    mm Induration 0 0 - 5 mm       No results found for this visit on 02/27/23. Assessment/ Plan:     1. BMI 36.0-36.9,adult    Start [place] as prescribed by her endocrinologist    2. Hyperlipidemia, unspecified hyperlipidemia type    Continue   enalapril (VASOTEC) 20 mg tablet, Take 1 Tablet by mouth daily. Indications: high blood pressure, Disp: 90 Tablet, Rfl: 1    spironolactone (ALDACTONE) 25 mg tablet, TAKE 1 TABLET BY MOUTH ONCE DAILY FOR  HYPOKALEMIA  PREVENTION, Disp: 90 Tablet, Rfl: 1    pravastatin (PRAVACHOL) 10 mg tablet, Take 1 Tablet by mouth daily. , Disp: 90 Tablet, Rfl: 1    amLODIPine (NORVASC) 10 mg tablet, Take 1 Tablet by mouth daily. , Disp: 90 Tablet, Rfl: 1     3. HTN     Continue   enalapril (VASOTEC) 20 mg tablet, Take 1 Tablet by mouth daily. Indications: high blood pressure, Disp: 90 Tablet, Rfl: 1    spironolactone (ALDACTONE) 25 mg tablet, TAKE 1 TABLET BY MOUTH ONCE DAILY FOR  HYPOKALEMIA  PREVENTION, Disp: 90 Tablet, Rfl: 1    pravastatin (PRAVACHOL) 10 mg tablet, Take 1 Tablet by mouth daily. , Disp: 90 Tablet, Rfl: 1    amLODIPine (NORVASC) 10 mg tablet, Take 1 Tablet by mouth daily. , Disp: 90 Tablet, Rfl: 1   No orders of the defined types were placed in this encounter. Verbal and written instructions (see AVS) provided. Patient expresses understanding of diagnosis and treatment plan.     Health Maintenance Due   Topic Date Due    Pneumococcal 0-64 years (1 - PCV) Never done    Diabetic Alb to Cr ratio (uACR) test  Never done    Hepatitis B Vaccine (1 of 3 - Risk 3-dose series) Never done    Shingles Vaccine (1 of 2) Never done    COVID-19 Vaccine (5 - Booster for Moderna series) 06/06/2022    Flu Vaccine (1) Never done               SPENSER Bah

## 2023-03-01 ENCOUNTER — TRANSCRIBE ORDER (OUTPATIENT)
Dept: REGISTRATION | Age: 53
End: 2023-03-01

## 2023-03-01 ENCOUNTER — HOSPITAL ENCOUNTER (OUTPATIENT)
Dept: GENERAL RADIOLOGY | Age: 53
Discharge: HOME | End: 2023-03-01
Payer: COMMERCIAL

## 2023-03-01 ENCOUNTER — TRANSCRIBE ORDER (OUTPATIENT)
Dept: SCHEDULING | Age: 53
End: 2023-03-01

## 2023-03-01 DIAGNOSIS — M25.562 LEFT KNEE PAIN: ICD-10-CM

## 2023-03-01 DIAGNOSIS — M25.562 LEFT KNEE PAIN: Primary | ICD-10-CM

## 2023-03-01 DIAGNOSIS — S83.242A ACUTE MEDIAL MENISCUS TEAR OF LEFT KNEE: Primary | ICD-10-CM

## 2023-03-01 PROCEDURE — 73564 X-RAY EXAM KNEE 4 OR MORE: CPT

## 2023-03-02 ENCOUNTER — TRANSCRIBE ORDER (OUTPATIENT)
Dept: SCHEDULING | Age: 53
End: 2023-03-02

## 2023-03-02 DIAGNOSIS — S83.242A ACUTE MEDIAL MENISCUS TEAR OF LEFT KNEE: Primary | ICD-10-CM

## 2023-03-08 ENCOUNTER — HOSPITAL ENCOUNTER (OUTPATIENT)
Dept: MRI IMAGING | Age: 53
Discharge: HOME | End: 2023-03-08
Attending: ORTHOPAEDIC SURGERY
Payer: COMMERCIAL

## 2023-03-08 DIAGNOSIS — S83.242A ACUTE MEDIAL MENISCUS TEAR OF LEFT KNEE: ICD-10-CM

## 2023-03-08 PROCEDURE — 73721 MRI JNT OF LWR EXTRE W/O DYE: CPT

## 2023-03-12 NOTE — PROGRESS NOTES
[address] [place]  [phone]  [address] [facility] [place] [facility]     Subjective:      Keshawn Penn, [facility]    Jeanine Lemon is a 47 y.o. female is here for follow up. Pmhx nonobstructive CAD, HTN, HLD, KALPANA, Graves Disease and GERD. Current cigarette smoker--0.5 PPD + family hx CAD (mother and brother)    Referred by pcp for intermittent palpitation. Seen in ED 6/28/2022 for atypical cp with negative cardiac work up. Initially seen by me in 7/6/2022: endorses intermittent heart fluttering 2-3 times a week, lasts a couple of minutes and resolves on its own.  + occasional sob and some left arm pain. Had normal echo stress test and event monitor since    Last seen by me in 9/2022    Today    She continues to smoke, 0.5 PPD. States compliance  With PAP and medication therapy. Working on wt loss  No further palpitation. Feels well    The patient denies chest pain/ shortness of breath, orthopnea, PND, LE edema, palpitations, syncope, presyncope or fatigue.        Patient Active Problem List    Diagnosis Date Noted    Biliary dyskinesia 11/03/2019    Graves disease     Severe obesity ([facility] [address] 12/27/2018    Sleep apnea 09/06/2017    Back pain 09/08/2016    Hypothyroidism 04/15/2015    Vertigo 03/17/2014    Pelvic mass 01/07/2014    Abnormal vaginal bleeding 01/07/2014    Migraine headache 10/08/2013    Chronic coronary artery disease 10/01/2013    Hypertension     Vitamin D deficiency     Hypokalemia     Sinusitis 11/04/2011    Overweight 10/17/2011    Benign hypertensive heart disease 09/22/2008      Dash Cardenas NP  Past Medical History:   Diagnosis Date    CAD (coronary artery disease) 10/2013    Colon polyps     Fatty liver     GERD (gastroesophageal reflux disease)     Graves disease     hypothyroid now since procedure    H. pylori infection     Headache     sometimes migraines    Hypertension     Hypokalemia     Menopause     Nausea & vomiting     Pneumothorax ~2012    Sleep apnea     cpap compliant    Vitamin D deficiency       Past Surgical History:   Procedure Laterality Date    HX BILATERAL SALPINGO-OOPHORECTOMY      HX CHOLECYSTECTOMY  2019    Lap debo w/cholangiogram    HX COLONOSCOPY      HX ENDOSCOPY      HX HEENT      Radioactive thyroid    HX HYSTERECTOMY      PARTIAL    RI UNLISTED PROCEDURE CARDIAC SURGERY  10-13    CARDIAC CATH     Allergies   Allergen Reactions    Dilaudid [Hydromorphone (Bulk)] Unknown (comments)     Respiratory suppression leading to intubation      Family History   Problem Relation Age of Onset    Kidney Disease Mother     Hypertension Mother     Diabetes Mother     Heart Disease Mother     Cancer Father         Brain    Heart Disease Brother     Hypertension Brother     Diabetes Maternal Grandmother     Cancer Maternal Grandmother     Heart Disease Maternal Grandmother     Hypertension Brother     Hypertension Sister     Diabetes Sister     Hypertension Sister     Hypertension Sister     Glaucoma Brother       Social History     Socioeconomic History    Marital status: SINGLE     Spouse name: Not on file    Number of children: Not on file    Years of education: Not on file    Highest education level: Not on file   Occupational History    Not on file   Tobacco Use    Smoking status: Every Day     Packs/day: 0.50     Years: 10.00     Pack years: 5.00     Types: Cigarettes     Last attempt to quit: 2022     Years since quittin.1    Smokeless tobacco: Never   Vaping Use    Vaping Use: Never used   Substance and Sexual Activity    Alcohol use:  Yes     Alcohol/week: 0.0 standard drinks     Comment: some weekends one or two beers    Drug use: No    Sexual activity: Not on file   Other Topics Concern    Not on file   Social History Narrative    Not on file     Social Determinants of Health     Financial Resource Strain: Medium Risk    Difficulty of Paying Living Expenses: Somewhat hard   Food Insecurity: No Food Insecurity Worried About [facility] in the Last Year: Never true    [facility] in the Last Year: Never true   Transportation Needs: No Transportation Needs    Lack of Transportation (Medical): No    Lack of Transportation (Non-Medical): No   Physical Activity: Not on file   Stress: Not on file   Social Connections: Not on file   Intimate Partner Violence: Not on file   Housing Stability: Not on file      Current Outpatient Medications   Medication Sig    butalbital-acetaminophen-caffeine (FIORICET, ESGIC) -40 mg per tablet TAKE 1 TABLET BY MOUTH EVERY 6 HOURS AS NEEDED FOR HEADACHE    cetirizine (ZYRTEC) 10 mg tablet TAKE 1 TABLET BY MOUTH AT BEDTIME AS NEEDED FOR ALLERGIES    enalapril (VASOTEC) 20 mg tablet Take 1 Tablet by mouth daily. Indications: high blood pressure    spironolactone (ALDACTONE) 25 mg tablet TAKE 1 TABLET BY MOUTH ONCE DAILY FOR  HYPOKALEMIA  PREVENTION    pravastatin (PRAVACHOL) 10 mg tablet Take 1 Tablet by mouth daily. amLODIPine (NORVASC) 10 mg tablet Take 1 Tablet by mouth daily. topiramate (TOPAMAX) 25 mg tablet Take 1 Tablet by mouth two (2) times a day. calcium acetate-aluminum sulf ([place]) 952-1,347 mg packet Apply 1 Packet to affected area three (3) times daily. methylPREDNISolone (MEDROL DOSEPACK) 4 mg tablet Take as directed. cholecalciferol, vitamin D3, (VITAMIN D3 PO) Take  by mouth. 125 mcg 5000 IU 1 A DAY     MULTIVITAMIN PO Take  by mouth daily. fluticasone propionate (FLONASE) 50 mcg/actuation nasal spray 2 sprays per nostril per day  Indications: inflammation of the nose due to an allergy    levothyroxine (SYNTHROID) 88 mcg tablet Take 1 Tab by mouth Daily (before breakfast). naproxen sodium 220 mg cap Take 220 Caps by mouth daily as needed. 2 a day    aspirin 81 mg chewable tablet Take 1 Tab by mouth daily. No current facility-administered medications for this visit.            Review of Symptoms:  11 systems reviewed, negative other than as stated in the HPI    Physical ExamPhysical Exam:    There were no vitals filed for this visit. There is no height or weight on file to calculate BMI. General PE   General:  Well developed, in no acute distress, cooperative and alert  HEENT: No carotid bruits, no JVD, trach is midline. Neck Supple, PEERL, EOM intact. Heart:  reg rate and rhythm; normal S1/S2; no murmurs, gallops or rubs. Respiratory: Clear bilaterally x 4, no wheezing or rales  Abdomen:   Soft, non-tender, no distention, no masses. + BS. Extremities:  Normal cap refill, no cyanosis, atraumatic. No edema. Neuro: A&Ox3, speech clear, gait stable. Skin: Skin color is normal. No rashes or lesions.  Non diaphoretic  Vascular: 2+ pulses symmetric in all extremities    Labs:   Lab Results   Component Value Date/Time    Cholesterol, total 205 (H) 06/23/2022 02:45 PM    Cholesterol, total 211 (H) 10/21/2020 03:47 PM    Cholesterol, total 188 02/18/2019 08:10 AM    Cholesterol, total 183 04/12/2018 09:47 AM    Cholesterol, total 179 11/17/2017 11:29 AM    HDL Cholesterol 62 06/23/2022 02:45 PM    HDL Cholesterol 66 10/21/2020 03:47 PM    HDL Cholesterol 57 02/18/2019 08:10 AM    HDL Cholesterol 65 04/12/2018 09:47 AM    HDL Cholesterol 66 11/17/2017 11:29 AM    LDL, calculated 121 (H) 06/23/2022 02:45 PM    LDL, calculated 126 (H) 10/21/2020 03:47 PM    LDL, calculated 109 (H) 02/18/2019 08:10 AM    LDL, calculated 104 (H) 04/12/2018 09:47 AM    LDL, calculated 102 (H) 11/17/2017 11:29 AM    LDL, calculated 101 (H) 05/20/2016 12:05 PM    Triglyceride 110 06/23/2022 02:45 PM    Triglyceride 109 10/21/2020 03:47 PM    Triglyceride 108 02/18/2019 08:10 AM    Triglyceride 70 04/12/2018 09:47 AM    Triglyceride 57 11/17/2017 11:29 AM    CHOL/HDL Ratio 3.3 06/23/2022 02:45 PM    CHOL/HDL Ratio 3.3 10/13/2013 10:30 AM     No results found for: CPK, CPKX, CPX  Lab Results   Component Value Date/Time    Sodium 143 06/26/2022 08:55 PM    Potassium 3.7 06/26/2022 08:55 PM    Chloride 108 06/26/2022 08:55 PM    CO2 24 06/26/2022 08:55 PM    Anion gap 11 06/26/2022 08:55 PM    Glucose 120 (H) 06/26/2022 08:55 PM    BUN 23 (H) 06/26/2022 08:55 PM    Creatinine 0.99 06/26/2022 08:55 PM    BUN/Creatinine ratio 23 (H) 06/26/2022 08:55 PM    GFR est AA >60 06/26/2022 08:55 PM    GFR est non-AA 59 (L) 06/26/2022 08:55 PM    Calcium 9.1 06/26/2022 08:55 PM    Bilirubin, total 0.2 06/26/2022 08:55 PM    Alk. phosphatase 108 06/26/2022 08:55 PM    Protein, total 7.4 06/26/2022 08:55 PM    Albumin 4.0 06/26/2022 08:55 PM    Globulin 3.4 06/26/2022 08:55 PM    A-G Ratio 1.2 06/26/2022 08:55 PM    ALT (SGPT) 18 06/26/2022 08:55 PM       EKG:       Assessment:     Assessment:        ICD-10-CM ICD-9-CM    1. Benign essential HTN  I10 401.1       2. Mixed hyperlipidemia  E78.2 272.2       3. Intermittent palpitations  R00.2 785.1       4. Controlled type 2 diabetes mellitus with complication, without long-term current use of insulin (HCC)  E11.8 250.90       5. KALPANA (obstructive sleep apnea)  G47.33 327.23       6. Coronary artery disease due to lipid rich plaque  I25.10 414.00     I25.83 414.3       7. Atypical chest pain  R07.89 786.59       8. Current smoker  F17.200 305.1       9. Family history of ischemic heart disease (IHD)  Z82.49 V17.3       10. Chronic coronary artery disease  I25.10 414.00           No orders of the defined types were placed in this encounter. [facility] in 10/15/ 2013  CONCLUSION  1. Mild nonobstructive coronary artery disease in a right dominant system  with preserved left ventricular function. 2.  Normal left ventricular filling pressures. No aortic stenosis. Systemic  arterial pressures are moderately elevated. 3.  Normal left ventricular function with an estimated ejection fraction of  60% and no significant mitral regurgitation. 4.  Right dominant coronary artery system with mild nonobstructive coronary  artery disease.     07/26/22    ECHO ADULT COMPLETE 07/30/2022 7/30/2022    Interpretation Summary  Formatting of this result is different from the original.      Left Ventricle: Normal left ventricular systolic function with a visually estimated EF of 50 - 55%. Left ventricle size is normal. Mildly increased wall thickness. Normal wall motion. Normal diastolic function. Signed by: Fletcher Green MD on 7/30/2022 11:04 AM    07/18/22    NUCLEAR CARDIAC STRESS TEST 07/19/2022 7/26/2022    Interpretation Summary  Formatting of this result is different from the original.      Stress Test: Patient converted to [facility] due to dyspnea and fatigue on Jose protocol. Patient exercised 6 minutes and 45 seconds and reached a max heart rate of 138. A pharmacological stress test was performed using lexiscan. Hemodynamics are adequate for diagnosis. Blood pressure demonstrated a normal response and heart rate demonstrated a normal response to stress. The patient's heart rate recovery was normal.    ECG: Resting ECG demonstrates normal sinus rhythm. ECG: Stress ECG was negative for ischemia. Nuclear Findings: Normal left ventricular systolic function post-stress. LVEF measures 59%. Nuclear Findings: LV perfusion is probably normal.    Perfusion Defect: There is a left ventricular stress perfusion defect that is small in size present in the mid anterior segment(s) that is fixed. Nuclear Findings: There is normal wall motion in the defect area. The defect appears to be probable artifact caused by soft tissue. Nuclear Findings: The study is negative for myocardial ischemia. Findings suggest a low risk of myocardial ischemia. Signed by: Fletcher Green MD on 7/19/2022 10:26 PM       Plan:     Coronary artery disease due to lipid rich plaque  Nonobstructive CAD per [facility] in 2013  NST 7/2022: negative for myocardial ischemia. Findings suggest a low risk of myocardial ischemia.   On ASA, CCB, statin    Intermittent palpitations  Hx Graves Disease  LVEF 50-55% valves ok per echo in 7/2022  2 week event: occasional PACs/PVCs  TSH ok 6/2022---on Levothyroxine followed by PCP  Will call for any recurrent palpitation, consider low dose BB or Dilt      Benign essential HTN  Controlled with current therapy  Serum Cr 0.99 in 6/2022    Mixed hyperlipidemia  6/2022  on Prava 10 mg Labs and lipids per PCP      KALPANA (obstructive sleep apnea)  On PAP therapy  Followed by pulmonary with [facility]--[facility]  Current smoker---tobacco cessation discussed with pt    Discussed importance of diet and exercise - eventual goal of 30 - 60 minutes, 5-7 times a week as per AHA guideline. Goal of 10 % (20 lbs) weight loss for 6 months. Patient was made aware during visit today that all testing completed would be instantaneously available on their MyChart for review. Discussed that these results will be made available to the provider at the same time. They were advised to wait at least 3 business days to allow for provider's interpretation of results with follow-up before calling our office with concerns about their results.     Continue current care and f/u in 6 mos      Lisa Ramirez NP

## 2023-03-15 ENCOUNTER — OFFICE VISIT (OUTPATIENT)
Dept: CARDIOLOGY CLINIC | Age: 53
End: 2023-03-15
Payer: COMMERCIAL

## 2023-03-15 VITALS
WEIGHT: 207 LBS | RESPIRATION RATE: 20 BRPM | HEIGHT: 64 IN | SYSTOLIC BLOOD PRESSURE: 116 MMHG | DIASTOLIC BLOOD PRESSURE: 82 MMHG | HEART RATE: 85 BPM | OXYGEN SATURATION: 98 % | BODY MASS INDEX: 35.34 KG/M2 | TEMPERATURE: 98.3 F

## 2023-03-15 DIAGNOSIS — I10 BENIGN ESSENTIAL HTN: Primary | ICD-10-CM

## 2023-03-15 DIAGNOSIS — F17.200 CURRENT SMOKER: ICD-10-CM

## 2023-03-15 DIAGNOSIS — G47.33 OSA (OBSTRUCTIVE SLEEP APNEA): ICD-10-CM

## 2023-03-15 DIAGNOSIS — I25.10 CORONARY ARTERY DISEASE DUE TO LIPID RICH PLAQUE: ICD-10-CM

## 2023-03-15 DIAGNOSIS — I25.83 CORONARY ARTERY DISEASE DUE TO LIPID RICH PLAQUE: ICD-10-CM

## 2023-03-15 DIAGNOSIS — R00.2 INTERMITTENT PALPITATIONS: ICD-10-CM

## 2023-03-15 DIAGNOSIS — Z82.49 FAMILY HISTORY OF ISCHEMIC HEART DISEASE (IHD): ICD-10-CM

## 2023-03-15 DIAGNOSIS — R07.89 ATYPICAL CHEST PAIN: ICD-10-CM

## 2023-03-15 DIAGNOSIS — E78.2 MIXED HYPERLIPIDEMIA: ICD-10-CM

## 2023-03-15 DIAGNOSIS — I25.10 CHRONIC CORONARY ARTERY DISEASE: ICD-10-CM

## 2023-03-15 DIAGNOSIS — E11.8 CONTROLLED TYPE 2 DIABETES MELLITUS WITH COMPLICATION, WITHOUT LONG-TERM CURRENT USE OF INSULIN (HCC): ICD-10-CM

## 2023-03-15 PROCEDURE — 99214 OFFICE O/P EST MOD 30 MIN: CPT | Performed by: NURSE PRACTITIONER

## 2023-03-15 PROCEDURE — 3079F DIAST BP 80-89 MM HG: CPT | Performed by: NURSE PRACTITIONER

## 2023-03-15 PROCEDURE — 3074F SYST BP LT 130 MM HG: CPT | Performed by: NURSE PRACTITIONER

## 2023-03-15 NOTE — PROGRESS NOTES
Identified pt with two pt identifiers(name and ). Reviewed record in preparation for visit and have obtained necessary documentation. Chief Complaint   Patient presents with    Coronary Artery Disease    Hypertension      Vitals:    03/15/23 1419   BP: 116/82   Pulse: 85   Resp: 20   Temp: 98.3 °F (36.8 °C)   TempSrc: Temporal   SpO2: 98%   Weight: 207 lb (93.9 kg)   Height: 5' 4\" (1.626 m)   PainSc:   0 - No pain       Medications reviewed/approved by provider. Health Maintenance Review: Patient reminded of \"due or due soon\" health maintenance. I have asked the patient to contact his/her primary care provider (PCP) for follow-up on his/her health maintenance. Coordination of Care Questionnaire:  :   1) Have you been to an emergency room, urgent care, or hospitalized since your last visit? If yes, where when, and reason for visit? no       2. Have seen or consulted any other health care provider since your last visit? If yes, where when, and reason for visit? NO      Patient is accompanied by self and friend I have received verbal consent from Jose Guadalupe Godinez to discuss any/all medical information while they are present in the room.

## 2023-03-16 ENCOUNTER — HOSPITAL ENCOUNTER (OUTPATIENT)
Age: 53
Setting detail: OUTPATIENT SURGERY
End: 2023-03-16
Attending: ORTHOPAEDIC SURGERY | Admitting: ORTHOPAEDIC SURGERY
Payer: COMMERCIAL

## 2023-03-21 ENCOUNTER — ANESTHESIA EVENT (OUTPATIENT)
Dept: SURGERY | Age: 53
End: 2023-03-21
Payer: COMMERCIAL

## 2023-03-28 NOTE — PERIOP NOTES
[address] [place] [address]  SURGICAL PRE-ADMISSION INSTRUCTIONS    ARRIVAL  You will be called the day before your surgery with your expected arrival time. Sign in at the  of the hospital.  You will be directed to the Surgical Waiting Room. Please arrive at your scheduled appointment time. You have been scheduled to arrive for your procedure one or two hours prior to the expected start time of your procedure. Every effort will be made to minimize your wait but please be aware that unforeseen circumstances may affect our schedule. EATING  DO NOT EAT OR DRINK ANYTHING AFTER MIDNIGHT ON THE EVENING BEFORE YOUR SURGERY OR ON THE DAY OF YOUR SURGERY except for your medications (as instructed) with a sip of water. Do not use gum, mints or lozenges on the morning of your surgery. Please do not smoke or chew tobacco before your surgery. MEDICATIONS   Take the following medications on the morning of your surgery with the smallest amount of water possible : Topamax, Amlodipine    STOP THESE MEDICATIONS AT THE TIMES LISTED BELOW  NONE     DRIVING/TRANSPORATION  Have a responsible adult to drive you home from the hospital and to stay with you over night. Please have them plan to remain in the hospital during your surgery. Your surgery will not be done if you do not have a responsible adult to take you home and to stay with you. If you have arranged for public transport, you must have a responsible adult to ride with you (who is not the ). You may not drive for 24 hours after anesthesia. PREPARATION  If you have a Living WiIl/Advance Directive, please bring a copy with you to scan into your chart. Please DO NOT wear makeup or nail polish  Please leave valuables at home,  DO NOT wear jewelry. Wear loose, comfortable clothing that is large enough to cover a bulky dressing.     SPECIAL INSTRUCTIONS:  Shower with the Preoperative Skin Preparation CHLORHEXIDINE as instructed.     Reviewed above preoperative instructions and answered questions by phone interview    Patient:  Jeffery Bustos   Date:     March 28, 2023  Time:   11:06 AM    RN:  Chuck Wallace RN    Date:     March 28, 2023  Time:   11:06 AM

## 2023-04-01 PROBLEM — S83.242A ACUTE MEDIAL MENISCUS TEAR OF LEFT KNEE: Status: ACTIVE | Noted: 2023-04-01

## 2023-04-01 PROBLEM — M22.42 CHONDROMALACIA, PATELLA, LEFT: Status: ACTIVE | Noted: 2023-04-01

## 2023-04-01 NOTE — H&P
History and Physical    Patient: Samy Mathew MRN: [id number]  SSN: [id number]    YOB: 1970  Age: 47 y.o. Sex: female      Subjective:      Samy Mathew is a 47 y.o. female who  has a long history of problems with left knee dating back to 2019. Recent MRI scan has demonstrated evidence of a posterior horn medial meniscus tear and chondromalacia patella. She is here for arthroscopic partial medial meniscectomy and debridement chondroplasty of the patellofemoral joint. For more detail on preoperative discussion referred to my office note. .     Past Medical History:   Diagnosis Date    CAD (coronary artery disease) 10/2013    Colon polyps     Fatty liver     GERD (gastroesophageal reflux disease)     Graves disease     hypothyroid now since procedure    H. pylori infection     Headache     sometimes migraines    Hypertension     Hypokalemia     Menopause     Nausea & vomiting     Pneumothorax ~2012    Sleep apnea     cpap compliant    Vitamin D deficiency      Past Surgical History:   Procedure Laterality Date    HX BILATERAL SALPINGO-OOPHORECTOMY      HX CHOLECYSTECTOMY  11/04/2019    Lap debo w/cholangiogram    HX COLONOSCOPY      HX ENDOSCOPY      HX HEENT      Radioactive thyroid    HX HYSTERECTOMY      PARTIAL    AR UNLISTED PROCEDURE CARDIAC SURGERY  10-13    CARDIAC CATH      Family History   Problem Relation Age of Onset    Kidney Disease Mother     Hypertension Mother     Diabetes Mother     Heart Disease Mother     Cancer Father         Brain    Heart Disease Brother     Hypertension Brother     Diabetes Maternal Grandmother     Cancer Maternal Grandmother     Heart Disease Maternal Grandmother     Hypertension Brother     Hypertension Sister     Diabetes Sister     Hypertension Sister     Hypertension Sister     Glaucoma Brother      Social History     Tobacco Use    Smoking status: Every Day     Packs/day: 0.50     Years: 10.00     Pack years: 5.00     Types: Cigarettes     Last attempt to quit: 2022     Years since quittin.1    Smokeless tobacco: Never   Substance Use Topics    Alcohol use: Yes     Alcohol/week: 0.0 standard drinks     Comment: some weekends one or two beers      Prior to Admission medications    Medication Sig Start Date End Date Taking? Authorizing Provider   Allergy Relief, cetirizine, 10 mg tablet TAKE 1 TABLET BY MOUTH AT BEDTIME AS NEEDED FOR ALLERGIES 3/25/23  Yes Addie Talbot NP   butalbital-acetaminophen-caffeine (FIORICET, ESGIC) -40 mg per tablet TAKE 1 TABLET BY MOUTH EVERY 6 HOURS AS NEEDED FOR HEADACHE 23  Yes Addie Talbot NP   enalapril (VASOTEC) 20 mg tablet Take 1 Tablet by mouth daily. Indications: high blood pressure 22  Yes Addie Talbot NP   spironolactone (ALDACTONE) 25 mg tablet TAKE 1 TABLET BY MOUTH ONCE DAILY FOR  HYPOKALEMIA  PREVENTION 22  Yes Addie Talbot NP   pravastatin (PRAVACHOL) 10 mg tablet Take 1 Tablet by mouth daily. 22  Yes Addie Talbot NP   amLODIPine (NORVASC) 10 mg tablet Take 1 Tablet by mouth daily. 11/15/22  Yes Addie Talbot NP   topiramate (TOPAMAX) 25 mg tablet Take 1 Tablet by mouth two (2) times a day. 11/15/22  Yes Addie Talbot NP   cholecalciferol, vitamin D3, (VITAMIN D3 PO) Take  by mouth. 125 mcg 5000 IU 1 A DAY    Yes Provider, Historical   MULTIVITAMIN PO Take  by mouth daily. Yes Provider, Historical   levothyroxine (SYNTHROID) 88 mcg tablet Take 1 Tab by mouth Daily (before breakfast). 19  Yes Addie Talbot NP   naproxen sodium 220 mg cap Take 220 Caps by mouth daily as needed. 2 a day   Yes Provider, Historical   aspirin 81 mg chewable tablet Take 1 Tab by mouth daily. 10/14/13  Yes Jesús Marroquin MD   calcium acetate-aluminum sulf ([place]) 952-1,347 mg packet Apply 1 Packet to affected area three (3) times daily.   Patient not taking: Reported on 3/28/2023 9/26/22   Melani Hutton NP   fluticasone propionate [facility]) 50 mcg/actuation nasal spray 2 sprays per nostril per day  Indications: inflammation of the nose due to an allergy  Patient not taking: Reported on 3/28/2023 4/1/19   Addie Talbot NP        Allergies   Allergen Reactions    Dilaudid [Hydromorphone (Bulk)] Unknown (comments)     Respiratory suppression leading to intubation       Review of Systems:  A comprehensive review of systems was negative. Objective: There were no vitals filed for this visit. Physical Exam:  GENERAL: alert, cooperative, no distress, appears stated age  LUNG: clear to auscultation bilaterally  HEART: regular rate and rhythm, S1, S2 normal, no murmur, click, rub or gallop  ABDOMEN: soft, non-tender. Bowel sounds normal. No masses,  no organomegaly  EXTREMITIES:  extremities normal, atraumatic, no cyanosis or edema  SKIN: Normal.  NEUROLOGIC: negative    Assessment:     Hospital Problems  Date Reviewed: 4/1/2023            Codes Class Noted POA    Acute medial meniscus tear of left knee ICD-10-CM: M54.798B  ICD-9-CM: 836.0  4/1/2023 Yes        * (Principal) Chondromalacia, patella, left ICD-10-CM: M22.42  ICD-9-CM: 717.7  4/1/2023 Yes           Plan:       Arthroscopy with 1. Partial medial meniscectomy.   2. Debridement chondroplasty patellofemoral joint    Signed By: Larinda Osler, MD     April 1, 2023

## 2023-04-04 ENCOUNTER — ANESTHESIA (OUTPATIENT)
Dept: SURGERY | Age: 53
End: 2023-04-04
Payer: COMMERCIAL

## 2023-04-04 PROBLEM — M22.42 CHONDROMALACIA, PATELLA, LEFT: Status: RESOLVED | Noted: 2023-04-01 | Resolved: 2023-04-04

## 2023-04-04 PROBLEM — S83.242A ACUTE MEDIAL MENISCUS TEAR OF LEFT KNEE: Status: RESOLVED | Noted: 2023-04-01 | Resolved: 2023-04-04

## 2023-04-04 PROCEDURE — 74011250636 HC RX REV CODE- 250/636: Performed by: ANESTHESIOLOGY

## 2023-04-04 PROCEDURE — 74011000250 HC RX REV CODE- 250: Performed by: ORTHOPAEDIC SURGERY

## 2023-04-04 PROCEDURE — 77030038066 HC BLD SHVR ARTH -B: Performed by: ORTHOPAEDIC SURGERY

## 2023-04-04 PROCEDURE — 74011250636 HC RX REV CODE- 250/636: Performed by: ORTHOPAEDIC SURGERY

## 2023-04-04 PROCEDURE — 2709999900 HC NON-CHARGEABLE SUPPLY: Performed by: ORTHOPAEDIC SURGERY

## 2023-04-04 PROCEDURE — 77030040922 HC BLNKT HYPOTHRM STRY -A: Performed by: ORTHOPAEDIC SURGERY

## 2023-04-04 PROCEDURE — 77030002916 HC SUT ETHLN J&J -A: Performed by: ORTHOPAEDIC SURGERY

## 2023-04-04 PROCEDURE — 77030018834: Performed by: ORTHOPAEDIC SURGERY

## 2023-04-04 PROCEDURE — 76060000032 HC ANESTHESIA 0.5 TO 1 HR: Performed by: ORTHOPAEDIC SURGERY

## 2023-04-04 PROCEDURE — 76010000138 HC OR TIME 0.5 TO 1 HR: Performed by: ORTHOPAEDIC SURGERY

## 2023-04-04 PROCEDURE — 77030008494 HC TBNG ARTHSC IRR ARTH -B: Performed by: ORTHOPAEDIC SURGERY

## 2023-04-04 PROCEDURE — 76210000006 HC OR PH I REC 0.5 TO 1 HR: Performed by: ORTHOPAEDIC SURGERY

## 2023-04-04 PROCEDURE — 77030028906 HC WRP KNEE W/GEL BGS SOLM -B: Performed by: ORTHOPAEDIC SURGERY

## 2023-04-04 PROCEDURE — 77030000032 HC CUF TRNQT ZIMM -B: Performed by: ORTHOPAEDIC SURGERY

## 2023-04-04 PROCEDURE — 74011000250 HC RX REV CODE- 250: Performed by: ANESTHESIOLOGY

## 2023-04-04 RX ORDER — DEXAMETHASONE SODIUM PHOSPHATE 4 MG/ML
INJECTION, SOLUTION INTRA-ARTICULAR; INTRALESIONAL; INTRAMUSCULAR; INTRAVENOUS; SOFT TISSUE AS NEEDED
Status: DISCONTINUED
Start: 2023-04-04 | End: 2023-04-04 | Stop reason: HOSPADM

## 2023-04-04 RX ORDER — KETOROLAC TROMETHAMINE 30 MG/ML
INJECTION, SOLUTION INTRAMUSCULAR; INTRAVENOUS AS NEEDED
Status: DISCONTINUED
Start: 2023-04-04 | End: 2023-04-04 | Stop reason: HOSPADM

## 2023-04-04 RX ORDER — HYDROCODONE BITARTRATE AND ACETAMINOPHEN 5; 325 MG/1; MG/1
1 TABLET ORAL
Qty: 16 TABLET | Refills: 0 | Status: SHIPPED
Start: 2023-04-04 | End: 2023-04-07

## 2023-04-04 RX ORDER — SODIUM CHLORIDE, SODIUM LACTATE, POTASSIUM CHLORIDE, CALCIUM CHLORIDE 600; 310; 30; 20 MG/100ML; MG/100ML; MG/100ML; MG/100ML
50 INJECTION, SOLUTION INTRAVENOUS CONTINUOUS
Status: DISCONTINUED
Start: 2023-04-04 | End: 2023-04-04 | Stop reason: HOSPADM

## 2023-04-04 RX ORDER — MIDAZOLAM HYDROCHLORIDE 1 MG/ML
INJECTION, SOLUTION INTRAMUSCULAR; INTRAVENOUS AS NEEDED
Status: DISCONTINUED
Start: 2023-04-04 | End: 2023-04-04 | Stop reason: HOSPADM

## 2023-04-04 RX ORDER — SODIUM CHLORIDE 0.9 % (FLUSH) 0.9 %
5-40 SYRINGE (ML) INJECTION EVERY 8 HOURS
Status: DISCONTINUED
Start: 2023-04-04 | End: 2023-04-04 | Stop reason: HOSPADM

## 2023-04-04 RX ORDER — ONDANSETRON 2 MG/ML
INJECTION INTRAMUSCULAR; INTRAVENOUS AS NEEDED
Status: DISCONTINUED
Start: 2023-04-04 | End: 2023-04-04 | Stop reason: HOSPADM

## 2023-04-04 RX ORDER — DIPHENHYDRAMINE HYDROCHLORIDE 50 MG/ML
12.5 INJECTION, SOLUTION INTRAMUSCULAR; INTRAVENOUS
Status: DISCONTINUED
Start: 2023-04-04 | End: 2023-04-04 | Stop reason: HOSPADM

## 2023-04-04 RX ORDER — FENTANYL CITRATE 50 UG/ML
INJECTION, SOLUTION INTRAMUSCULAR; INTRAVENOUS AS NEEDED
Status: DISCONTINUED
Start: 2023-04-04 | End: 2023-04-04 | Stop reason: HOSPADM

## 2023-04-04 RX ORDER — IBUPROFEN 800 MG/1
800 TABLET ORAL
Qty: 60 TABLET | Refills: 2 | Status: SHIPPED
Start: 2023-04-04

## 2023-04-04 RX ORDER — FENTANYL CITRATE 50 UG/ML
25 INJECTION, SOLUTION INTRAMUSCULAR; INTRAVENOUS AS NEEDED
Status: DISCONTINUED
Start: 2023-04-04 | End: 2023-04-04 | Stop reason: HOSPADM

## 2023-04-04 RX ORDER — LIDOCAINE HYDROCHLORIDE 20 MG/ML
INJECTION, SOLUTION EPIDURAL; INFILTRATION; INTRACAUDAL; PERINEURAL AS NEEDED
Status: DISCONTINUED
Start: 2023-04-04 | End: 2023-04-04 | Stop reason: HOSPADM

## 2023-04-04 RX ORDER — GLYCOPYRROLATE 0.2 MG/ML
INJECTION INTRAMUSCULAR; INTRAVENOUS AS NEEDED
Status: DISCONTINUED
Start: 2023-04-04 | End: 2023-04-04 | Stop reason: HOSPADM

## 2023-04-04 RX ORDER — PROPOFOL 10 MG/ML
INJECTION, EMULSION INTRAVENOUS AS NEEDED
Status: DISCONTINUED
Start: 2023-04-04 | End: 2023-04-04 | Stop reason: HOSPADM

## 2023-04-04 RX ORDER — SODIUM CHLORIDE, SODIUM LACTATE, POTASSIUM CHLORIDE, CALCIUM CHLORIDE 600; 310; 30; 20 MG/100ML; MG/100ML; MG/100ML; MG/100ML
100 INJECTION, SOLUTION INTRAVENOUS CONTINUOUS
Status: DISCONTINUED
Start: 2023-04-04 | End: 2023-04-04 | Stop reason: HOSPADM

## 2023-04-04 RX ORDER — ONDANSETRON 2 MG/ML
4 INJECTION INTRAMUSCULAR; INTRAVENOUS
Status: DISCONTINUED
Start: 2023-04-04 | End: 2023-04-04 | Stop reason: HOSPADM

## 2023-04-04 RX ORDER — SODIUM CHLORIDE 0.9 % (FLUSH) 0.9 %
5-40 SYRINGE (ML) INJECTION AS NEEDED
Status: DISCONTINUED
Start: 2023-04-04 | End: 2023-04-04 | Stop reason: HOSPADM

## 2023-04-04 RX ORDER — BUPIVACAINE HYDROCHLORIDE 5 MG/ML
20 INJECTION, SOLUTION EPIDURAL; INTRACAUDAL ONCE
Status: COMPLETED
Start: 2023-04-04 | End: 2023-04-04

## 2023-04-04 RX ADMIN — SODIUM CHLORIDE, POTASSIUM CHLORIDE, SODIUM LACTATE AND CALCIUM CHLORIDE 100 ML/HR: 600; 310; 30; 20 INJECTION, SOLUTION INTRAVENOUS at 11:17

## 2023-04-04 RX ADMIN — LIDOCAINE HYDROCHLORIDE 100 MG: 20 INJECTION, SOLUTION EPIDURAL; INFILTRATION; INTRACAUDAL; PERINEURAL at 11:34

## 2023-04-04 RX ADMIN — FENTANYL CITRATE 50 MCG: 50 INJECTION, SOLUTION INTRAMUSCULAR; INTRAVENOUS at 11:56

## 2023-04-04 RX ADMIN — KETOROLAC TROMETHAMINE 30 MG: 30 INJECTION, SOLUTION INTRAMUSCULAR at 11:57

## 2023-04-04 RX ADMIN — DEXAMETHASONE SODIUM PHOSPHATE 4 MG: 4 INJECTION, SOLUTION INTRAMUSCULAR; INTRAVENOUS at 11:57

## 2023-04-04 RX ADMIN — MIDAZOLAM 2 MG: 1 INJECTION INTRAMUSCULAR; INTRAVENOUS at 11:30

## 2023-04-04 RX ADMIN — FENTANYL CITRATE 50 MCG: 50 INJECTION, SOLUTION INTRAMUSCULAR; INTRAVENOUS at 11:49

## 2023-04-04 RX ADMIN — ONDANSETRON 4 MG: 2 INJECTION INTRAMUSCULAR; INTRAVENOUS at 11:30

## 2023-04-04 RX ADMIN — GLYCOPYRROLATE 0.2 MG: 0.4 INJECTION INTRAMUSCULAR; INTRAVENOUS at 11:30

## 2023-04-04 RX ADMIN — PROPOFOL 200 MG: 10 INJECTION, EMULSION INTRAVENOUS at 11:34

## 2023-04-04 RX ADMIN — FENTANYL CITRATE 25 MCG: 50 INJECTION, SOLUTION INTRAMUSCULAR; INTRAVENOUS at 13:05

## 2023-04-04 NOTE — ANESTHESIA PREPROCEDURE EVALUATION
Relevant Problems   RESPIRATORY SYSTEM   (+) Sleep apnea      NEUROLOGY   (+) Migraine headache      CARDIOVASCULAR   (+) Chronic coronary artery disease   (+) Hypertension      ENDOCRINE   (+) Hypothyroidism   (+) Severe obesity (HCC)       Anesthetic History     PONV          Review of Systems / Medical History  Patient summary reviewed, nursing notes reviewed and pertinent labs reviewed    Pulmonary        Sleep apnea: CPAP           Neuro/Psych   Within defined limits           Cardiovascular    Hypertension          CAD         GI/Hepatic/Renal     GERD      Liver disease (fatty)     Endo/Other      Hypothyroidism: well controlled  Morbid obesity and arthritis     Other Findings              Physical Exam    Airway  Mallampati: II  TM Distance: 4 - 6 cm  Neck ROM: normal range of motion   Mouth opening: Normal     Cardiovascular  Regular rate and rhythm,  S1 and S2 normal,  no murmur, click, rub, or gallop  Rhythm: regular  Rate: normal         Dental  No notable dental hx       Pulmonary  Breath sounds clear to auscultation               Abdominal  GI exam deferred       Other Findings            Anesthetic Plan    ASA: 2  Anesthesia type: general          Induction: Intravenous  Anesthetic plan and risks discussed with: Patient

## 2023-04-04 NOTE — ANESTHESIA POSTPROCEDURE EVALUATION
Post-Anesthesia Evaluation and Assessment    Cardiovascular Function/Vital Signs  Visit Vitals  /80   Pulse 67   Temp 36.8 °C (98.2 °F)   Resp 14   Ht 5' 4\" (1.626 m)   Wt 88.5 kg (195 lb)   SpO2 97%   BMI 33.47 kg/m²       Patient is status post Procedure(s):  LEFT KNEE ARTHROSCOPY, MENISCECTOMY. Nausea/Vomiting: Controlled. Postoperative hydration reviewed and adequate. Pain:  Pain Scale 1: Numeric (0 - 10) (04/04/23 1054)  Pain Intensity 1: 2 (04/04/23 1054)   Managed. Neurological Status:   Neuro (WDL): Within Defined Limits (04/04/23 1058)   At baseline. Mental Status and Level of Consciousness: Arousable. Pulmonary Status:   O2 Device: Nasal cannula (04/04/23 1234)   Adequate oxygenation and airway patent. Complications related to anesthesia: None    Patient has met all discharge requirements.     Signed By: Skip Stubbs MD    April 4, 2023

## 2023-04-04 NOTE — DISCHARGE INSTRUCTIONS
Tierra Feng MD    Patient Name  Bhupendra Livingston  Date of procedure  4/4/2023    Procedure  Procedure(s):  LEFT KNEE ARTHROSCOPY, MENISCECTOMY  Surgeon  Surgeon(s) and Role:     * Cristin Renteria MD - Primary  Date of discharge: [unfilled]  PCP: @PCP@    POST-OPERATIVE INSTRUCTIONS  Knee Arthroscopy    First meal at home should be clear liquids. Progress to regular diet as tolerated. Apply an ice bag or use cold therapy device for 20-30 minutes 4 times a day for the first 48-72 hours to reduce swelling and pain and then use as desired. You may remove the bulky dressing 48 hours after surgery and at that time it is ok to shower. Apply band-aids over the small incisions and change daily after showers. Elevate your surgical leg when sitting or sleeping to reduce swelling. Do not place a pillow directly under the knee, place it under your ankle. Exercises - practice quadricep tightening, straight leg raising exercises, and ankle pumps several times a day. Gradually work on bending your knee as tolerated. Crutches will be provided if you do not already have them. Use crutches  If necessary for balance. You may fully weight bear with 2 crutches and progress off of them as you tolerate. Let pain be your guide, too much pain, too much activity. A prescription for pain medication will be provided for you on the day of your surgery. Please take one aspirin a day for 7 days beginning the day after your surgery. If you develop a fever greater than 102, unexpected redness or swelling in your arm, please take Tylenol and call the office. Some bleeding and or drainage can be expected the first few days after surgery. A post-op appointment has been scheduled for you. Please call the office if you need to re-schedule your appointment for another day or time ([phone]). Thank you for following the above instructions.   If you have any questions, please call my office and the  will put you in touch with one of my assistants ([phone]). ______________________________________________________________________    Anesthesia Discharge Instructions    After general anesthesia or intervenous sedation, for 24 hours or while taking prescription Narcotics:  Limit your activities  Do not drive or operate hazardous machinery  If you have not urinated within 8 hours after discharge, please contact your surgeon on call. Do not make important personal or business decisions  Do not drink alcoholic beverages    Report the following to your surgeon:  Excessive pain, swelling, redness or odor of or around the surgical area  Temperature over 100.5 degrees  Nausea and vomiting lasting longer than 4 hours or if unable to take medication  Any signs of decreased circulation or nerve impairment to extremity:  Change in color, persistent numbness, tingling, coldness or increased pain.   Any questions

## 2023-04-04 NOTE — OP NOTES
[facility]  OPERATIVE REPORT    Name:  Patricia Tavares  MR#:  [id number]  :  1970  ACCOUNT #:  [de-identified]  DATE OF SERVICE:  2023    PREOPERATIVE DIAGNOSES:  1. Tear of the posterior horn of medial meniscus, left knee. 2.  Grade IV focal chondral lesion, central portion of trochlear groove, left knee. POSTOPERATIVE DIAGNOSES:  1. Tear of the posterior horn of medial meniscus, left knee. 2.  Grade IV focal chondral lesion, central portion of trochlear groove, left knee. PROCEDURE PERFORMED:  Arthroscopy of left knee with:  1. Partial medial meniscectomy. 2.  Debridement chondroplasty of focal full-thickness chondral lesion within the trochlear groove. SURGEON:  Rosario Bob MD    ASSISTANT:  None. ANESTHESIA:  General LMA, Dr. Jonathon Berger. COMPLICATIONS:  None. SPECIMENS REMOVED:  Knee shavings. IMPLANTS:  None. ESTIMATED BLOOD LOSS:  Zero. TOURNIQUET TIME:  28 minutes. PROCEDURE:  The patient was evaluated in the outpatient surgery area. Left knee marked as surgical site, verified with the patient. Preoperative MRI scan and x-ray were reviewed showing minimal degenerative changes. There was evidence of an undersurface tear involving the posterior horn of the medial meniscus adjacent to the body. In addition, there was area of focal full-thickness cartilage loss within the trochlear groove. The remainder of the MRI scan appeared to be within normal limits. ACL and PCL was intact. She was taken to the operating room, she was placed supine on the operating table, placed under general LMA anesthesia. Exam under anesthesia demonstrated good ligament stability. She had full range of motion of the knee. She was placed in tourniquet leg holding device, prepped and draped in the usual sterile fashion. Time-out was called verifying the patient's site of surgery and surgical procedure.   The extremity was exsanguinated and the tourniquet inflated to 300 mmHg. Scope introduced through an anterolateral portal.  Anteromedial portal was established for instrumentation. Visualization in the patellofemoral joint revealed there was no loose bodies within the suprapatellar pouch. Moving to the undersurface of patella, there was some mild grade II to III chondral changes on the undersurface of the patella. Moving into the trochlear groove, there were some flaps of articular cartilage within the central portion of the trochlear groove in the location seen on the MRI scanning. Otherwise, there was no plica and the articular cartilage appeared to be normal.  Patella tracked and appeared to be within normal limits. At this point, the scope was used to visualize through the medial compartment. There was noted to be a normal appearing medial meniscus. However, probing through the medial portal demonstrated that there was a central tear, full-thickness, in the posterior horn of medial meniscus. This was debrided with combination of duckbill forceps and motorized resector back to a stable contour. This was confirmed with a probe. This involved removing about half of the posterior horn of medial meniscus central portion. There was some mild fraying of the articular cartilage on the medial femoral condyle which was debrided as necessary. Moving into the intercondylar notch, this was clear. The ACL was intact and probed and was stable. Moving into the lateral compartment, the lateral meniscus had a normal appearance throughout and the lateral compartment had no degenerative changes present. Moving back to the patellofemoral joint, motorized resector was used both through the medial and lateral portal switching as necessary. Debris was carried out on the undersurface of patella which required limited debridement. The central trochlear groove lesion, however, had fairly unstable articular cartilage present. This was debrided with a stable edge.   This measured about 8 x 8 mm in size and was full-thickness. The edges were stable following debridement. Patellar tracking was assessed and this appeared to be within normal limits. At this point, the joint was irrigated. Scope and instruments were removed. The incision closed with 3-0 nylon and the subcutaneous tissue around the incision was anesthetized with 20 mL of 0.5% Marcaine. Sterile dressing was applied. Tourniquet released for tourniquet time of 28 minutes. The patient returned to the recovery room in satisfactory condition.       MD KEVIN Steiner/[id number]  D:  04/04/2023 12:48  T:  04/04/2023 14:50  JOB #:  [id number]  CC:  Desmond Kearns MD

## 2023-04-04 NOTE — BRIEF OP NOTE
Brief Postoperative Note    Patient: Tru Handley  YOB: 1970  MRN: [id number]    Date of Procedure: 4/4/2023     Pre-Op Diagnosis: LEFT KNEE MEDIAL MENISCUS TEAR    Post-Op Diagnosis: Same as preoperative diagnosis.       Procedure(s):  LEFT KNEE ARTHROSCOPY, MENISCECTOMY    Surgeon(s):  lEian Wolfe MD    Surgical Assistant: None    Anesthesia: General     Estimated Blood Loss (mL): Minimal    Complications: None    Specimens: * No specimens in log *     Implants: * No implants in log *    Drains: * No LDAs found *    Findings: medial meniscus tear and troch groove CM    Electronically Signed by Chandra Aguirre MD on 4/4/2023 at 12:41 PM

## 2023-04-07 ENCOUNTER — OFFICE VISIT (OUTPATIENT)
Dept: FAMILY MEDICINE CLINIC | Age: 53
End: 2023-04-07
Payer: COMMERCIAL

## 2023-05-15 ENCOUNTER — TELEPHONE (OUTPATIENT)
Age: 53
End: 2023-05-15

## 2023-05-15 NOTE — TELEPHONE ENCOUNTER
Medication Refill Request    Arcadio Vega is requesting a refill of the following medication(s):   Wegovy   Needs med at next dosage. Please send refill to:      [address], [address] [place] [address] [address] [employer] [phone] [facility] [phone]  [address] [address]  Phone: [phone] Fax: [phone]

## 2023-05-16 RX ORDER — SPIRONOLACTONE 25 MG/1
TABLET ORAL
Qty: 90 TABLET | Refills: 0 | Status: SHIPPED | OUTPATIENT
Start: 2023-05-16

## 2023-05-16 NOTE — TELEPHONE ENCOUNTER
S/w patient she was given a sample at her Endocrinologist and insist you had written a RX for it.  She has been on 0.5 and needs the next dose sent to [facility]

## 2023-05-31 RX ORDER — POTASSIUM CHLORIDE 20 MEQ/1
TABLET, EXTENDED RELEASE ORAL
Qty: 90 TABLET | Refills: 0 | Status: SHIPPED | OUTPATIENT
Start: 2023-05-31

## 2023-06-01 LAB — HBA1C MFR BLD HPLC: 5.6 %

## 2023-06-02 RX ORDER — CETIRIZINE HYDROCHLORIDE 10 MG/1
TABLET, FILM COATED ORAL
Qty: 30 TABLET | Refills: 0 | Status: SHIPPED | OUTPATIENT
Start: 2023-06-02

## 2023-06-02 NOTE — TELEPHONE ENCOUNTER
Medication Refill Request    Kamran Manning is requesting a refill of the following medication(s):     Requested Prescriptions     Pending Prescriptions Disp Refills    EQ ALLERGY RELIEF, CETIRIZINE, 10 MG tablet [Pharmacy Med Name: EQ Allergy Relief (Cetirizine) 10 MG Oral Tablet] 30 tablet 0     Sig: TAKE 1 TABLET BY MOUTH AT BEDTIME AS NEEDED FOR ALLERGIES        Date Of Last Office Visit With Provider : 4/7/2023     Please send refill to:      [facility] [address], [address] [place] [address] [address] [facility] [phone] [facility] [phone]  [address] [address]  Phone: [phone] Fax: [phone]           Message sent to PCP for medication refill request .

## 2023-06-23 RX ORDER — CETIRIZINE HYDROCHLORIDE 10 MG/1
TABLET ORAL
Qty: 30 TABLET | Refills: 0 | Status: SHIPPED | OUTPATIENT
Start: 2023-06-23

## 2023-06-30 RX ORDER — BUTALBITAL, ACETAMINOPHEN AND CAFFEINE 50; 325; 40 MG/1; MG/1; MG/1
TABLET ORAL
Qty: 30 TABLET | Refills: 0 | Status: SHIPPED | OUTPATIENT
Start: 2023-06-30

## 2023-07-03 ENCOUNTER — TELEPHONE (OUTPATIENT)
Age: 53
End: 2023-07-03

## 2023-07-03 NOTE — TELEPHONE ENCOUNTER
PC to confirm per Tierra Tolliver if an 3 month fu appointment has been scheduled with [facility] as recommended? A VM left to return the call.

## 2023-07-03 NOTE — TELEPHONE ENCOUNTER
Ben Jimenez, the fu appointment has been scheduled for October 4 months instead of 3 months per the imaging center. Message to [facility] for [address].

## 2023-08-11 RX ORDER — SPIRONOLACTONE 25 MG/1
TABLET ORAL
Qty: 90 TABLET | Refills: 0 | Status: SHIPPED | OUTPATIENT
Start: 2023-08-11

## 2023-08-11 RX ORDER — PRAVASTATIN SODIUM 10 MG
TABLET ORAL
Qty: 90 TABLET | Refills: 0 | Status: SHIPPED | OUTPATIENT
Start: 2023-08-11

## 2023-09-15 ENCOUNTER — OFFICE VISIT (OUTPATIENT)
Age: 53
End: 2023-09-15
Payer: MEDICARE

## 2023-09-15 VITALS
BODY MASS INDEX: 32.61 KG/M2 | HEART RATE: 86 BPM | TEMPERATURE: 97.3 F | OXYGEN SATURATION: 98 % | RESPIRATION RATE: 18 BRPM | DIASTOLIC BLOOD PRESSURE: 78 MMHG | WEIGHT: 191 LBS | SYSTOLIC BLOOD PRESSURE: 140 MMHG | HEIGHT: 64 IN

## 2023-09-15 DIAGNOSIS — E11.8 TYPE 2 DIABETES MELLITUS WITH UNSPECIFIED COMPLICATIONS (HCC): ICD-10-CM

## 2023-09-15 DIAGNOSIS — R00.2 PALPITATIONS: ICD-10-CM

## 2023-09-15 DIAGNOSIS — E78.2 MIXED HYPERLIPIDEMIA: ICD-10-CM

## 2023-09-15 DIAGNOSIS — I25.10 ATHEROSCLEROSIS OF NATIVE CORONARY ARTERY OF NATIVE HEART WITHOUT ANGINA PECTORIS: Primary | ICD-10-CM

## 2023-09-15 DIAGNOSIS — I10 ESSENTIAL (PRIMARY) HYPERTENSION: ICD-10-CM

## 2023-09-15 DIAGNOSIS — G47.33 OBSTRUCTIVE SLEEP APNEA (ADULT) (PEDIATRIC): ICD-10-CM

## 2023-09-15 DIAGNOSIS — Z87.891 HISTORY OF CIGARETTE SMOKING: ICD-10-CM

## 2023-09-15 PROCEDURE — 99214 OFFICE O/P EST MOD 30 MIN: CPT | Performed by: NURSE PRACTITIONER

## 2023-09-15 PROCEDURE — 3078F DIAST BP <80 MM HG: CPT | Performed by: NURSE PRACTITIONER

## 2023-09-15 PROCEDURE — 3077F SYST BP >= 140 MM HG: CPT | Performed by: NURSE PRACTITIONER

## 2023-09-15 PROCEDURE — 93000 ELECTROCARDIOGRAM COMPLETE: CPT | Performed by: NURSE PRACTITIONER

## 2023-09-15 ASSESSMENT — PATIENT HEALTH QUESTIONNAIRE - PHQ9
SUM OF ALL RESPONSES TO PHQ QUESTIONS 1-9: 0
2. FEELING DOWN, DEPRESSED OR HOPELESS: 0
1. LITTLE INTEREST OR PLEASURE IN DOING THINGS: 0
SUM OF ALL RESPONSES TO PHQ QUESTIONS 1-9: 0
SUM OF ALL RESPONSES TO PHQ9 QUESTIONS 1 & 2: 0

## 2023-09-18 RX ORDER — BENAZEPRIL HYDROCHLORIDE 40 MG/1
40 TABLET, FILM COATED ORAL DAILY
Qty: 90 TABLET | Refills: 0 | Status: SHIPPED | OUTPATIENT
Start: 2023-09-18

## 2023-09-18 RX ORDER — TOPIRAMATE 25 MG/1
TABLET ORAL
Qty: 90 TABLET | Refills: 0 | Status: SHIPPED | OUTPATIENT
Start: 2023-09-18

## 2023-09-18 RX ORDER — AMLODIPINE BESYLATE 10 MG/1
TABLET ORAL
Qty: 90 TABLET | Refills: 0 | Status: SHIPPED | OUTPATIENT
Start: 2023-09-18

## 2023-09-29 ENCOUNTER — HOSPITAL ENCOUNTER (EMERGENCY)
Facility: HOSPITAL | Age: 53
Discharge: HOME | End: 2023-09-29
Attending: EMERGENCY MEDICINE
Payer: COMMERCIAL

## 2023-09-29 VITALS
SYSTOLIC BLOOD PRESSURE: 161 MMHG | HEART RATE: 73 BPM | BODY MASS INDEX: 32.27 KG/M2 | TEMPERATURE: 98.7 F | RESPIRATION RATE: 18 BRPM | HEIGHT: 64 IN | DIASTOLIC BLOOD PRESSURE: 78 MMHG | WEIGHT: 189 LBS | OXYGEN SATURATION: 98 %

## 2023-09-29 DIAGNOSIS — R03.0 ELEVATED BLOOD PRESSURE READING: ICD-10-CM

## 2023-09-29 DIAGNOSIS — S60.562A INSECT BITE OF LEFT HAND, INITIAL ENCOUNTER: Primary | ICD-10-CM
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
## 2023-09-29 PROCEDURE — 99283 EMERGENCY DEPT VISIT LOW MDM: CPT

## 2023-09-29 RX ORDER — CEPHALEXIN 500 MG/1
500 CAPSULE ORAL 4 TIMES DAILY
Qty: 20 CAPSULE | Refills: 0 | Status: SHIPPED | OUTPATIENT
Start: 2023-09-29 | End: 2023-10-04

## 2023-09-29 ASSESSMENT — PAIN DESCRIPTION - DESCRIPTORS: DESCRIPTORS: OTHER (COMMENT)

## 2023-09-29 ASSESSMENT — LIFESTYLE VARIABLES
HOW MANY STANDARD DRINKS CONTAINING ALCOHOL DO YOU HAVE ON A TYPICAL DAY: PATIENT DOES NOT DRINK
HOW OFTEN DO YOU HAVE A DRINK CONTAINING ALCOHOL: NEVER

## 2023-09-29 ASSESSMENT — PAIN DESCRIPTION - LOCATION
LOCATION: ARM
LOCATION: HAND

## 2023-09-29 ASSESSMENT — PAIN DESCRIPTION - ORIENTATION
ORIENTATION: LEFT
ORIENTATION: LEFT

## 2023-09-29 ASSESSMENT — PAIN - FUNCTIONAL ASSESSMENT
PAIN_FUNCTIONAL_ASSESSMENT: 0-10

## 2023-09-29 ASSESSMENT — PAIN SCALES - GENERAL
PAINLEVEL_OUTOF10: 9
PAINLEVEL_OUTOF10: 9

## 2023-09-29 NOTE — ED TRIAGE NOTES
Pt arrived by POV for arm swelling. Pt reports she was at urgent care  for a bug bite and was given prednisone at 7pm.  Pt also took a benadryl last night. Pt has not  her prescription and complains of continued swelling.  Pt is awake alert and oriented X 4

## 2023-09-29 NOTE — ED NOTES
I have reviewed discharge instructions with the patient. The patient verbalized understanding. Discharge medications discussed with patient. No questions at this time. Ambulated without difficulty.       Mona Rosas RN  09/29/23 [id number]

## 2023-10-01 NOTE — ED PROVIDER NOTES
25 MG tablet TAKE 1 TABLET BY MOUTH ONCE DAILY FOR  HYPOKALEMIA  PREVENTION., Disp-90 tablet, R-0Normal      butalbital-acetaminophen-caffeine (FIORICET, ESGIC) -40 MG per tablet TAKE 1 TABLET BY MOUTH EVERY 6 HOURS AS NEEDED FOR HEADACHE, Disp-30 tablet, R-0Normal      cetirizine (ZYRTEC) 10 MG tablet TAKE 1 TABLET BY MOUTH AT BEDTIME AS NEEDED FOR ALLERGIES, Disp-30 tablet, R-0Normal      potassium chloride (KLOR-CON M) 20 MEQ extended release tablet Take 1 tablet by mouth once daily, Disp-90 tablet, R-0Normal      Semaglutide-Weight Management (WEGOVY) 1 MG/0.5ML SOAJ SC injection Inject 1 mg into the skin every 7 days, Disp-4 mL, R-1Normal      Multiple Vitamin (MULTIVITAMIN PO) Take by mouth dailyHistorical Med      Alum Sulfate-Ca Acetate ([place]) PACK Apply 1 packet topically 3 times dailyHistorical Med      aspirin 81 MG chewable tablet Take 1 tablet by mouth dailyHistorical Med      enalapril (VASOTEC) 20 MG tablet Take 1 tablet by mouth dailyHistorical Med      fluticasone (FLONASE) 50 MCG/ACT nasal spray 2 sprays per nostril per day  Indications: inflammation of the nose due to an allergyHistorical Med      levothyroxine (SYNTHROID) 88 MCG tablet Take 1 tablet by mouth every morning (before breakfast)Historical Med      Naproxen Sodium 220 MG CAPS Take 220 capsules by mouth daily as neededHistorical Med             DISCONTINUED MEDICATIONS:  Discharge Medication List as of 9/29/2023  1:02 PM          PATIENT REFERRED TO:  Follow Up with:  SHARAD Nieves NP  [address] [address]  [phone]    Schedule an appointment as soon as possible for a visit       [address] [place] [address] ([facility])  [address] [place] [address],[facility] Floor [facility]  [phone]    If symptoms worsen      Return to ED if worse     Diagnosis     Clinical Impression:   1. Insect bite of left hand, initial encounter    2.  Elevated blood pressure reading          IDeny MD am the first provider for this

## 2023-10-04 LAB — HBA1C MFR BLD HPLC: 5.5 %

## 2023-10-16 RX ORDER — CETIRIZINE HYDROCHLORIDE 10 MG/1
TABLET ORAL
Qty: 30 TABLET | Refills: 0 | Status: SHIPPED | OUTPATIENT
Start: 2023-10-16

## 2023-10-18 ENCOUNTER — OFFICE VISIT (OUTPATIENT)
Age: 53
End: 2023-10-18
Payer: COMMERCIAL

## 2023-10-18 VITALS
BODY MASS INDEX: 33.12 KG/M2 | DIASTOLIC BLOOD PRESSURE: 80 MMHG | RESPIRATION RATE: 18 BRPM | SYSTOLIC BLOOD PRESSURE: 132 MMHG | WEIGHT: 194 LBS | HEART RATE: 73 BPM | OXYGEN SATURATION: 99 % | HEIGHT: 64 IN | TEMPERATURE: 97.3 F

## 2023-10-18 DIAGNOSIS — E78.5 HYPERLIPIDEMIA, UNSPECIFIED HYPERLIPIDEMIA TYPE: ICD-10-CM

## 2023-10-18 DIAGNOSIS — R30.0 DYSURIA: Primary | ICD-10-CM

## 2023-10-18 LAB
BILIRUBIN, URINE, POC: NEGATIVE
BLOOD URINE, POC: NEGATIVE
GLUCOSE URINE, POC: NEGATIVE
KETONES, URINE, POC: NEGATIVE
LEUKOCYTE ESTERASE, URINE, POC: ABNORMAL
NITRITE, URINE, POC: NEGATIVE
PH, URINE, POC: 6 (ref 4.6–8)
PROTEIN,URINE, POC: NEGATIVE
SPECIFIC GRAVITY, URINE, POC: 1.03 (ref 1–1.03)
URINALYSIS CLARITY, POC: CLEAR
URINALYSIS COLOR, POC: YELLOW
UROBILINOGEN, POC: ABNORMAL

## 2023-10-18 PROCEDURE — 99213 OFFICE O/P EST LOW 20 MIN: CPT | Performed by: NURSE PRACTITIONER

## 2023-10-18 PROCEDURE — 3078F DIAST BP <80 MM HG: CPT | Performed by: NURSE PRACTITIONER

## 2023-10-18 PROCEDURE — 3074F SYST BP LT 130 MM HG: CPT | Performed by: NURSE PRACTITIONER

## 2023-10-18 PROCEDURE — 81003 URINALYSIS AUTO W/O SCOPE: CPT | Performed by: NURSE PRACTITIONER

## 2023-10-18 ASSESSMENT — PATIENT HEALTH QUESTIONNAIRE - PHQ9
SUM OF ALL RESPONSES TO PHQ QUESTIONS 1-9: 0
SUM OF ALL RESPONSES TO PHQ9 QUESTIONS 1 & 2: 0
SUM OF ALL RESPONSES TO PHQ QUESTIONS 1-9: 0
2. FEELING DOWN, DEPRESSED OR HOPELESS: 0
1. LITTLE INTEREST OR PLEASURE IN DOING THINGS: 0

## 2023-10-19 LAB
BACTERIA SPEC CULT: NORMAL
CHOLEST SERPL-MCNC: 176 MG/DL
HDLC SERPL-MCNC: 67 MG/DL
HDLC SERPL: 2.6 (ref 0–5)
LDLC SERPL CALC-MCNC: 90.8 MG/DL (ref 0–100)
SERVICE CMNT-IMP: NORMAL
TRIGL SERPL-MCNC: 91 MG/DL
VLDLC SERPL CALC-MCNC: 18.2 MG/DL

## 2023-11-05 RX ORDER — SPIRONOLACTONE 25 MG/1
TABLET ORAL
Qty: 90 TABLET | Refills: 0 | Status: SHIPPED | OUTPATIENT
Start: 2023-11-05

## 2023-11-05 RX ORDER — PRAVASTATIN SODIUM 10 MG
TABLET ORAL
Qty: 90 TABLET | Refills: 0 | Status: SHIPPED | OUTPATIENT
Start: 2023-11-05

## 2023-11-05 RX ORDER — BUTALBITAL, ACETAMINOPHEN AND CAFFEINE 50; 325; 40 MG/1; MG/1; MG/1
TABLET ORAL
Qty: 30 TABLET | Refills: 0 | Status: SHIPPED | OUTPATIENT
Start: 2023-11-05

## 2023-11-27 ENCOUNTER — OFFICE VISIT (OUTPATIENT)
Age: 53
End: 2023-11-27
Payer: COMMERCIAL

## 2023-11-27 VITALS
BODY MASS INDEX: 33.63 KG/M2 | HEART RATE: 86 BPM | WEIGHT: 197 LBS | TEMPERATURE: 97.7 F | HEIGHT: 64 IN | SYSTOLIC BLOOD PRESSURE: 136 MMHG | DIASTOLIC BLOOD PRESSURE: 72 MMHG | OXYGEN SATURATION: 98 % | RESPIRATION RATE: 16 BRPM

## 2023-11-27 DIAGNOSIS — Z00.00 WELL ADULT EXAM: ICD-10-CM

## 2023-11-27 DIAGNOSIS — Z00.00 ENCOUNTER FOR WELL ADULT EXAM WITHOUT ABNORMAL FINDINGS: Primary | ICD-10-CM

## 2023-11-27 DIAGNOSIS — R73.03 PREDIABETES: ICD-10-CM

## 2023-11-27 LAB
ALBUMIN, URINE, POC: 30 MG/L
CREATININE, URINE, POC: 300 MG/DL
MICROALB/CREAT RATIO, POC: <30 MG/G

## 2023-11-27 PROCEDURE — 82044 UR ALBUMIN SEMIQUANTITATIVE: CPT | Performed by: NURSE PRACTITIONER

## 2023-11-27 PROCEDURE — 99396 PREV VISIT EST AGE 40-64: CPT | Performed by: NURSE PRACTITIONER

## 2023-11-27 PROCEDURE — 3075F SYST BP GE 130 - 139MM HG: CPT | Performed by: NURSE PRACTITIONER

## 2023-11-27 PROCEDURE — 3078F DIAST BP <80 MM HG: CPT | Performed by: NURSE PRACTITIONER

## 2023-11-27 ASSESSMENT — PATIENT HEALTH QUESTIONNAIRE - PHQ9
1. LITTLE INTEREST OR PLEASURE IN DOING THINGS: 0
SUM OF ALL RESPONSES TO PHQ9 QUESTIONS 1 & 2: 0
2. FEELING DOWN, DEPRESSED OR HOPELESS: 0
SUM OF ALL RESPONSES TO PHQ QUESTIONS 1-9: 0

## 2023-11-28 RX ORDER — CETIRIZINE HYDROCHLORIDE 10 MG/1
TABLET ORAL
Qty: 30 TABLET | Refills: 0 | Status: SHIPPED | OUTPATIENT
Start: 2023-11-28

## 2023-12-05 ENCOUNTER — OFFICE VISIT (OUTPATIENT)
Age: 53
End: 2023-12-05
Payer: COMMERCIAL

## 2023-12-05 VITALS
SYSTOLIC BLOOD PRESSURE: 130 MMHG | WEIGHT: 198.2 LBS | OXYGEN SATURATION: 99 % | HEART RATE: 72 BPM | RESPIRATION RATE: 20 BRPM | TEMPERATURE: 97.8 F | BODY MASS INDEX: 33.84 KG/M2 | HEIGHT: 64 IN | DIASTOLIC BLOOD PRESSURE: 80 MMHG

## 2023-12-05 DIAGNOSIS — E87.6 HYPOKALEMIA: ICD-10-CM

## 2023-12-05 DIAGNOSIS — J01.00 ACUTE NON-RECURRENT MAXILLARY SINUSITIS: Primary | ICD-10-CM

## 2023-12-05 PROCEDURE — 99213 OFFICE O/P EST LOW 20 MIN: CPT

## 2023-12-05 PROCEDURE — 3079F DIAST BP 80-89 MM HG: CPT

## 2023-12-05 PROCEDURE — 3075F SYST BP GE 130 - 139MM HG: CPT

## 2023-12-05 SDOH — ECONOMIC STABILITY: FOOD INSECURITY: WITHIN THE PAST 12 MONTHS, YOU WORRIED THAT YOUR FOOD WOULD RUN OUT BEFORE YOU GOT MONEY TO BUY MORE.: NEVER TRUE

## 2023-12-05 SDOH — ECONOMIC STABILITY: INCOME INSECURITY: HOW HARD IS IT FOR YOU TO PAY FOR THE VERY BASICS LIKE FOOD, HOUSING, MEDICAL CARE, AND HEATING?: NOT HARD AT ALL

## 2023-12-05 SDOH — ECONOMIC STABILITY: HOUSING INSECURITY
IN THE LAST 12 MONTHS, WAS THERE A TIME WHEN YOU DID NOT HAVE A STEADY PLACE TO SLEEP OR SLEPT IN A SHELTER (INCLUDING NOW)?: NO

## 2023-12-05 SDOH — ECONOMIC STABILITY: FOOD INSECURITY: WITHIN THE PAST 12 MONTHS, THE FOOD YOU BOUGHT JUST DIDN'T LAST AND YOU DIDN'T HAVE MONEY TO GET MORE.: NEVER TRUE

## 2023-12-05 ASSESSMENT — PATIENT HEALTH QUESTIONNAIRE - PHQ9
SUM OF ALL RESPONSES TO PHQ QUESTIONS 1-9: 0
SUM OF ALL RESPONSES TO PHQ9 QUESTIONS 1 & 2: 0
1. LITTLE INTEREST OR PLEASURE IN DOING THINGS: 0
SUM OF ALL RESPONSES TO PHQ QUESTIONS 1-9: 0
2. FEELING DOWN, DEPRESSED OR HOPELESS: 0

## 2023-12-05 ASSESSMENT — ENCOUNTER SYMPTOMS
COUGH: 0
SORE THROAT: 1
CHEST TIGHTNESS: 1
WHEEZING: 1

## 2023-12-05 NOTE — PROGRESS NOTES
Chief Complaint   Patient presents with    Headache     Feeling fatigue with SOB x 4 days  Some wheezing       HPI:     is a 47 y.o. female who presents for an acute visit. She endorses headache, fatigue, nasal congestion, and chest tightness that began about three days ago; she has been taking APAP with some relief of her symptoms. She reports having similar symptoms several months ago and her potassium was low at that time; she would like to have this checked today. She denies fever, chills, CP, palpitations. Allergies   Allergen Reactions    Hydromorphone      Other reaction(s): Unknown (comments)  Respiratory suppression leading to intubation       Current Outpatient Medications   Medication Sig Dispense Refill    cetirizine (ZYRTEC) 10 MG tablet TAKE 1 TABLET BY MOUTH AT BEDTIME AS NEEDED FOR ALLERGIES 30 tablet 0    pravastatin (PRAVACHOL) 10 MG tablet Take 1 tablet by mouth once daily 90 tablet 0    spironolactone (ALDACTONE) 25 MG tablet TAKE 1 TABLET BY MOUTH ONCE DAILY FOR HYPOKALEMIA PREVENTION 90 tablet 0    benazepril (LOTENSIN) 40 MG tablet TAKE 1 TABLET BY MOUTH ONCE DAILY FOR HIGH BLOOD PRESSURE 90 tablet 0    topiramate (TOPAMAX) 25 MG tablet Take 1 tablet by mouth twice daily 90 tablet 0    amLODIPine (NORVASC) 10 MG tablet Take 1 tablet by mouth once daily 90 tablet 0    aspirin 81 MG chewable tablet Take 1 tablet by mouth daily      enalapril (VASOTEC) 20 MG tablet Take 1 tablet by mouth daily      fluticasone (FLONASE) 50 MCG/ACT nasal spray 2 sprays per nostril per day  Indications: inflammation of the nose due to an allergy      levothyroxine (SYNTHROID) 88 MCG tablet Take 1 tablet by mouth every morning (before breakfast)       No current facility-administered medications for this visit.        Past Medical History:   Diagnosis Date    CAD (coronary artery disease) 10/2013    Colon polyps     Fatty liver     GERD (gastroesophageal reflux disease)     Graves disease

## 2023-12-06 LAB
ANION GAP SERPL CALC-SCNC: 6 MMOL/L (ref 5–15)
BUN SERPL-MCNC: 19 MG/DL (ref 6–20)
BUN/CREAT SERPL: 24 (ref 12–20)
CALCIUM SERPL-MCNC: 9 MG/DL (ref 8.5–10.1)
CHLORIDE SERPL-SCNC: 116 MMOL/L (ref 97–108)
CO2 SERPL-SCNC: 19 MMOL/L (ref 21–32)
CREAT SERPL-MCNC: 0.8 MG/DL (ref 0.55–1.02)
GLUCOSE SERPL-MCNC: 98 MG/DL (ref 65–100)
MAGNESIUM SERPL-MCNC: 2.5 MG/DL (ref 1.6–2.4)
POTASSIUM SERPL-SCNC: 3.7 MMOL/L (ref 3.5–5.1)
SODIUM SERPL-SCNC: 141 MMOL/L (ref 136–145)

## 2023-12-09 ENCOUNTER — HOSPITAL ENCOUNTER (EMERGENCY)
Facility: HOSPITAL | Age: 53
Discharge: HOME | End: 2023-12-10
Attending: EMERGENCY MEDICINE
Payer: COMMERCIAL

## 2023-12-09 VITALS
SYSTOLIC BLOOD PRESSURE: 137 MMHG | TEMPERATURE: 99.2 F | RESPIRATION RATE: 18 BRPM | HEART RATE: 61 BPM | DIASTOLIC BLOOD PRESSURE: 77 MMHG | OXYGEN SATURATION: 100 %

## 2023-12-09 DIAGNOSIS — G43.809 OTHER MIGRAINE WITHOUT STATUS MIGRAINOSUS, NOT INTRACTABLE: Primary | ICD-10-CM

## 2023-12-09 PROCEDURE — 96374 THER/PROPH/DIAG INJ IV PUSH: CPT

## 2023-12-09 PROCEDURE — 6360000002 HC RX W HCPCS: Performed by: EMERGENCY MEDICINE

## 2023-12-09 PROCEDURE — 99284 EMERGENCY DEPT VISIT MOD MDM: CPT

## 2023-12-09 PROCEDURE — 96375 TX/PRO/DX INJ NEW DRUG ADDON: CPT

## 2023-12-09 RX ORDER — KETOROLAC TROMETHAMINE 30 MG/ML
30 INJECTION, SOLUTION INTRAMUSCULAR; INTRAVENOUS
Status: COMPLETED | OUTPATIENT
Start: 2023-12-09 | End: 2023-12-09

## 2023-12-09 RX ORDER — DIPHENHYDRAMINE HYDROCHLORIDE 50 MG/ML
25 INJECTION INTRAMUSCULAR; INTRAVENOUS
Status: COMPLETED | OUTPATIENT
Start: 2023-12-09 | End: 2023-12-09

## 2023-12-09 RX ORDER — METOCLOPRAMIDE HYDROCHLORIDE 5 MG/ML
10 INJECTION INTRAMUSCULAR; INTRAVENOUS EVERY 6 HOURS
Status: DISCONTINUED | OUTPATIENT
Start: 2023-12-09 | End: 2023-12-10 | Stop reason: HOSPADM

## 2023-12-09 RX ORDER — 0.9 % SODIUM CHLORIDE 0.9 %
1000 INTRAVENOUS SOLUTION INTRAVENOUS ONCE
Status: DISCONTINUED | OUTPATIENT
Start: 2023-12-09 | End: 2023-12-10 | Stop reason: HOSPADM

## 2023-12-09 RX ADMIN — KETOROLAC TROMETHAMINE 30 MG: 30 INJECTION, SOLUTION INTRAMUSCULAR; INTRAVENOUS at 23:52

## 2023-12-09 RX ADMIN — METOCLOPRAMIDE HYDROCHLORIDE 10 MG: 5 INJECTION INTRAMUSCULAR; INTRAVENOUS at 23:55

## 2023-12-09 RX ADMIN — DIPHENHYDRAMINE HYDROCHLORIDE 25 MG: 50 INJECTION INTRAMUSCULAR; INTRAVENOUS at 23:53

## 2023-12-09 ASSESSMENT — PAIN SCALES - GENERAL: PAINLEVEL_OUTOF10: 8

## 2023-12-09 ASSESSMENT — ENCOUNTER SYMPTOMS
SINUS PAIN: 1
SORE THROAT: 0
VOMITING: 0
DIARRHEA: 0
SHORTNESS OF BREATH: 0
SINUS PRESSURE: 1
NAUSEA: 1

## 2023-12-10 VITALS
DIASTOLIC BLOOD PRESSURE: 79 MMHG | RESPIRATION RATE: 18 BRPM | SYSTOLIC BLOOD PRESSURE: 138 MMHG | OXYGEN SATURATION: 99 % | TEMPERATURE: 98.2 F | HEART RATE: 67 BPM

## 2023-12-10 DIAGNOSIS — G43.809 OTHER MIGRAINE WITHOUT STATUS MIGRAINOSUS, NOT INTRACTABLE: Primary | ICD-10-CM

## 2023-12-10 LAB
ANION GAP SERPL CALC-SCNC: 13 MMOL/L (ref 5–15)
BASOPHILS # BLD: 0 K/UL (ref 0–0.1)
BASOPHILS NFR BLD: 0 % (ref 0–1)
BUN SERPL-MCNC: 27 MG/DL (ref 6–20)
BUN/CREAT SERPL: 35 (ref 12–20)
CALCIUM SERPL-MCNC: 9.6 MG/DL (ref 8.5–10.1)
CHLORIDE SERPL-SCNC: 105 MMOL/L (ref 97–108)
CO2 SERPL-SCNC: 22 MMOL/L (ref 21–32)
CREAT SERPL-MCNC: 0.77 MG/DL (ref 0.55–1.02)
DIFFERENTIAL METHOD BLD: ABNORMAL
EOSINOPHIL # BLD: 0 K/UL (ref 0–0.4)
EOSINOPHIL NFR BLD: 0 % (ref 0–7)
ERYTHROCYTE [DISTWIDTH] IN BLOOD BY AUTOMATED COUNT: 14.8 % (ref 11.5–14.5)
GLUCOSE SERPL-MCNC: 157 MG/DL (ref 65–100)
HCT VFR BLD AUTO: 37.8 % (ref 35–47)
HGB BLD-MCNC: 13.2 G/DL (ref 11.5–16)
IMM GRANULOCYTES # BLD AUTO: 0 K/UL (ref 0–0.04)
IMM GRANULOCYTES NFR BLD AUTO: 0 % (ref 0–0.5)
LYMPHOCYTES # BLD: 1.2 K/UL (ref 0.8–3.5)
LYMPHOCYTES NFR BLD: 21 % (ref 12–49)
MCH RBC QN AUTO: 31.9 PG (ref 26–34)
MCHC RBC AUTO-ENTMCNC: 34.9 G/DL (ref 30–36.5)
MCV RBC AUTO: 91.3 FL (ref 80–99)
MONOCYTES # BLD: 0.3 K/UL (ref 0–1)
MONOCYTES NFR BLD: 5 % (ref 5–13)
NEUTS SEG # BLD: 4.4 K/UL (ref 1.8–8)
NEUTS SEG NFR BLD: 74 % (ref 32–75)
NRBC # BLD: 0 K/UL (ref 0–0.01)
NRBC BLD-RTO: 0 PER 100 WBC
PLATELET # BLD AUTO: 303 K/UL (ref 150–400)
PMV BLD AUTO: 10.1 FL (ref 8.9–12.9)
POTASSIUM SERPL-SCNC: 3.6 MMOL/L (ref 3.5–5.1)
RBC # BLD AUTO: 4.14 M/UL (ref 3.8–5.2)
SODIUM SERPL-SCNC: 140 MMOL/L (ref 136–145)
WBC # BLD AUTO: 5.9 K/UL (ref 3.6–11)

## 2023-12-10 PROCEDURE — 85025 COMPLETE CBC W/AUTO DIFF WBC: CPT

## 2023-12-10 PROCEDURE — 36415 COLL VENOUS BLD VENIPUNCTURE: CPT

## 2023-12-10 PROCEDURE — 80048 BASIC METABOLIC PNL TOTAL CA: CPT

## 2023-12-10 PROCEDURE — 2580000003 HC RX 258: Performed by: EMERGENCY MEDICINE

## 2023-12-10 PROCEDURE — 6360000002 HC RX W HCPCS: Performed by: EMERGENCY MEDICINE

## 2023-12-10 PROCEDURE — 6370000000 HC RX 637 (ALT 250 FOR IP): Performed by: EMERGENCY MEDICINE

## 2023-12-10 RX ORDER — KETOROLAC TROMETHAMINE 30 MG/ML
30 INJECTION, SOLUTION INTRAMUSCULAR; INTRAVENOUS
Status: COMPLETED | OUTPATIENT
Start: 2023-12-10 | End: 2023-12-10

## 2023-12-10 RX ORDER — 0.9 % SODIUM CHLORIDE 0.9 %
1000 INTRAVENOUS SOLUTION INTRAVENOUS ONCE
Status: COMPLETED | OUTPATIENT
Start: 2023-12-10 | End: 2023-12-10

## 2023-12-10 RX ORDER — BUTALBITAL, ACETAMINOPHEN AND CAFFEINE 50; 325; 40 MG/1; MG/1; MG/1
1 TABLET ORAL
Status: COMPLETED | OUTPATIENT
Start: 2023-12-10 | End: 2023-12-10

## 2023-12-10 RX ORDER — SUMATRIPTAN 50 MG/1
50 TABLET, FILM COATED ORAL
Qty: 20 TABLET | Refills: 0 | Status: SHIPPED | OUTPATIENT
Start: 2023-12-10 | End: 2023-12-10

## 2023-12-10 RX ORDER — DIPHENHYDRAMINE HYDROCHLORIDE 50 MG/ML
25 INJECTION INTRAMUSCULAR; INTRAVENOUS
Status: COMPLETED | OUTPATIENT
Start: 2023-12-10 | End: 2023-12-10

## 2023-12-10 RX ORDER — BUTALBITAL, ACETAMINOPHEN AND CAFFEINE 300; 40; 50 MG/1; MG/1; MG/1
1 CAPSULE ORAL EVERY 6 HOURS PRN
Qty: 12 CAPSULE | Refills: 0 | Status: SHIPPED | OUTPATIENT
Start: 2023-12-10

## 2023-12-10 RX ORDER — METOCLOPRAMIDE HYDROCHLORIDE 5 MG/ML
10 INJECTION INTRAMUSCULAR; INTRAVENOUS EVERY 6 HOURS
Status: DISCONTINUED | OUTPATIENT
Start: 2023-12-10 | End: 2023-12-10 | Stop reason: HOSPADM

## 2023-12-10 RX ADMIN — SODIUM CHLORIDE 1000 ML: 9 INJECTION, SOLUTION INTRAVENOUS at 06:31

## 2023-12-10 RX ADMIN — BUTALBITAL, ACETAMINOPHEN AND CAFFEINE 1 TABLET: 325; 50; 40 TABLET ORAL at 08:51

## 2023-12-10 RX ADMIN — METOCLOPRAMIDE HYDROCHLORIDE 10 MG: 5 INJECTION INTRAMUSCULAR; INTRAVENOUS at 06:37

## 2023-12-10 RX ADMIN — KETOROLAC TROMETHAMINE 30 MG: 30 INJECTION, SOLUTION INTRAMUSCULAR; INTRAVENOUS at 06:32

## 2023-12-10 RX ADMIN — DIPHENHYDRAMINE HYDROCHLORIDE 25 MG: 50 INJECTION INTRAMUSCULAR; INTRAVENOUS at 06:34

## 2023-12-10 ASSESSMENT — PAIN SCALES - GENERAL
PAINLEVEL_OUTOF10: 9
PAINLEVEL_OUTOF10: 8
PAINLEVEL_OUTOF10: 4

## 2023-12-10 ASSESSMENT — PAIN DESCRIPTION - DESCRIPTORS: DESCRIPTORS: THROBBING

## 2023-12-10 ASSESSMENT — ENCOUNTER SYMPTOMS
SINUS PRESSURE: 1
DIARRHEA: 0
NAUSEA: 0
VOMITING: 0
SINUS PAIN: 1
COUGH: 0

## 2023-12-10 ASSESSMENT — PAIN DESCRIPTION - ORIENTATION: ORIENTATION: ANTERIOR;MID

## 2023-12-10 ASSESSMENT — PAIN DESCRIPTION - LOCATION: LOCATION: HEAD

## 2023-12-10 NOTE — ED NOTES
Written and verbal discharge instructions reviewed with patient. All discharge medications reviewed and explained. Understanding verbalized, all questions answered. Ambulated out, gait steady.          Marie Ramires RN  12/10/23 0005

## 2023-12-10 NOTE — ED PROVIDER NOTES
[facility] EMERGENCY DEP  EMERGENCY DEPARTMENT ENCOUNTER       Pt Name: Carleen Saleh  MRN: [id number]  [address] [facility] [place] 1970  Date of evaluation: 12/10/2023  Provider: Leda Barrera MD   PCP: SHARAD Smith NP  Note Started: 6:57 AM 12/10/23      FINAL IMPRESSION     1. Other migraine without status migrainosus, not intractable          DISPOSITION/PLAN     Disposition:  DISPOSITION        Discharge Note: The patient is stable for discharge home. The signs, symptoms, diagnosis, and discharge instructions have been discussed, understanding conveyed, and agreed upon. The patient is to follow up as recommended or return to ER should their symptoms worsen.       PATIENT REFERRED TO:  SHARAD Smith NP  [address] [address]  [phone]    Schedule an appointment as soon as possible for a visit in 2 days  For Follow Up            DISCHARGE MEDICATIONS:     Medication List        ASK your doctor about these medications      amLODIPine 10 MG tablet  Commonly known as: NORVASC  Take 1 tablet by mouth once daily     aspirin 81 MG chewable tablet     benazepril 40 MG tablet  Commonly known as: LOTENSIN  TAKE 1 TABLET BY MOUTH ONCE DAILY FOR HIGH BLOOD PRESSURE     butalbital-APAP-caffeine -40 MG Caps per capsule  Commonly known as: Fioricet  Take 1 capsule by mouth every 6 hours as needed for Headaches     cetirizine 10 MG tablet  Commonly known as: ZYRTEC  TAKE 1 TABLET BY MOUTH AT BEDTIME AS NEEDED FOR ALLERGIES     enalapril 20 MG tablet  Commonly known as: VASOTEC     fluticasone 50 MCG/ACT nasal spray  Commonly known as: FLONASE     levothyroxine 88 MCG tablet  Commonly known as: SYNTHROID     pravastatin 10 MG tablet  Commonly known as: PRAVACHOL  Take 1 tablet by mouth once daily     spironolactone 25 MG tablet  Commonly known as: ALDACTONE  TAKE 1 TABLET BY MOUTH ONCE DAILY FOR HYPOKALEMIA PREVENTION     SUMAtriptan 50 MG tablet  Commonly known as: IMITREX  Take 1 tablet by

## 2023-12-10 NOTE — ED PROVIDER NOTES
rest drink plenty fluids see your doctor for follow-up return for change or worsening symptoms    ED Course:   Initial assessment performed. The patients presenting problems have been discussed, and they are in agreement with the care plan formulated and outlined with them. I have encouraged them to ask questions as they arise throughout their visit. ED Course as of 12/10/23 0037   Ulysses Casper Dec 10, 2023   0035 Patient reports headache is improving, drowsy from the medication, reports he is ready for discharge reviewed medications follow-up questions answered [MF]      ED Course User Index  [MF] Noah Hair MD           12:37 AM    Pt has been re-examined and states that they are feeling better and have no new complaints. medications, diagnosis, follow up plan and return instructions have been reviewed and discussed with the patient and/or family. Pt and/or family were instructed on symptoms that may arise after discharge requiring re-evaluation by a physician. Pt and/or family have had the opportunity to ask questions about their care. Patient and/or family verbalized understanding and agreement with care plan, follow up and return instructions. Patient and/or family agree to return in 50 hours if their symptoms are not improving or immediately if they have any change in their condition. I have also put together some discharge instructions for patient that include: 1) educational information regarding their diagnosis, 2) how to care for their diagnosis at home, as well a 3) list of reasons why they would want to return to the ED prior to their follow-up appointment, should their condition change. Leena Cerda MD        Medical Decision Making  Problems Addressed:  Other migraine without status migrainosus, not intractable: acute illness or injury    Risk  OTC drugs. Prescription drug management.       MDM  Reviewed: nursing note and vitals                  Disposition Considerations (Tests

## 2023-12-19 RX ORDER — CETIRIZINE HYDROCHLORIDE 10 MG/1
TABLET ORAL
Qty: 90 TABLET | Refills: 1 | Status: SHIPPED | OUTPATIENT
Start: 2023-12-19

## 2023-12-19 RX ORDER — BUTALBITAL, ACETAMINOPHEN AND CAFFEINE 300; 40; 50 MG/1; MG/1; MG/1
1 CAPSULE ORAL EVERY 6 HOURS PRN
Qty: 12 CAPSULE | Refills: 0 | Status: SHIPPED | OUTPATIENT
Start: 2023-12-19 | End: 2024-01-29 | Stop reason: SDUPTHER

## 2024-01-29 ENCOUNTER — OFFICE VISIT (OUTPATIENT)
Age: 54
End: 2024-01-29
Payer: COMMERCIAL

## 2024-01-29 VITALS
HEIGHT: 64 IN | WEIGHT: 195 LBS | DIASTOLIC BLOOD PRESSURE: 80 MMHG | SYSTOLIC BLOOD PRESSURE: 110 MMHG | RESPIRATION RATE: 18 BRPM | OXYGEN SATURATION: 98 % | TEMPERATURE: 98.4 F | BODY MASS INDEX: 33.29 KG/M2 | HEART RATE: 63 BPM

## 2024-01-29 DIAGNOSIS — G44.009 MIGRAINE-CLUSTER HEADACHE SYNDROME: Primary | ICD-10-CM

## 2024-01-29 PROCEDURE — 3074F SYST BP LT 130 MM HG: CPT | Performed by: NURSE PRACTITIONER

## 2024-01-29 PROCEDURE — 3079F DIAST BP 80-89 MM HG: CPT | Performed by: NURSE PRACTITIONER

## 2024-01-29 PROCEDURE — 96372 THER/PROPH/DIAG INJ SC/IM: CPT | Performed by: NURSE PRACTITIONER

## 2024-01-29 PROCEDURE — 99213 OFFICE O/P EST LOW 20 MIN: CPT | Performed by: NURSE PRACTITIONER

## 2024-01-29 RX ORDER — ONDANSETRON 4 MG/1
4 TABLET, FILM COATED ORAL EVERY 8 HOURS PRN
Qty: 20 TABLET | Refills: 2 | Status: SHIPPED | OUTPATIENT
Start: 2024-01-29

## 2024-01-29 RX ORDER — ONDANSETRON 4 MG/1
4 TABLET, FILM COATED ORAL EVERY 8 HOURS PRN
COMMUNITY
End: 2024-01-29 | Stop reason: SDUPTHER

## 2024-01-29 RX ORDER — KETOROLAC TROMETHAMINE 30 MG/ML
30 INJECTION, SOLUTION INTRAMUSCULAR; INTRAVENOUS ONCE
Status: COMPLETED | OUTPATIENT
Start: 2024-01-29 | End: 2024-01-29

## 2024-01-29 RX ORDER — BUTALBITAL, ACETAMINOPHEN AND CAFFEINE 300; 40; 50 MG/1; MG/1; MG/1
1 CAPSULE ORAL EVERY 6 HOURS PRN
Qty: 12 CAPSULE | Refills: 2 | Status: SHIPPED | OUTPATIENT
Start: 2024-01-29

## 2024-01-29 RX ORDER — BUTALBITAL, ACETAMINOPHEN AND CAFFEINE 300; 40; 50 MG/1; MG/1; MG/1
CAPSULE ORAL
Qty: 12 CAPSULE | Refills: 0 | OUTPATIENT
Start: 2024-01-29

## 2024-01-29 RX ORDER — METOCLOPRAMIDE 10 MG/1
10 TABLET ORAL EVERY 6 HOURS PRN
Qty: 90 TABLET | Refills: 0 | Status: SHIPPED | OUTPATIENT
Start: 2024-01-29

## 2024-01-29 RX ORDER — SUMATRIPTAN 50 MG/1
50 TABLET, FILM COATED ORAL
Qty: 9 TABLET | Refills: 0 | Status: SHIPPED | OUTPATIENT
Start: 2024-01-29 | End: 2024-01-29

## 2024-01-29 RX ORDER — MULTIVIT-MIN/IRON/FOLIC ACID/K 18-600-40
CAPSULE ORAL
COMMUNITY

## 2024-01-29 RX ADMIN — KETOROLAC TROMETHAMINE 30 MG: 30 INJECTION, SOLUTION INTRAMUSCULAR; INTRAVENOUS at 16:46

## 2024-01-29 ASSESSMENT — PATIENT HEALTH QUESTIONNAIRE - PHQ9
SUM OF ALL RESPONSES TO PHQ QUESTIONS 1-9: 0
1. LITTLE INTEREST OR PLEASURE IN DOING THINGS: 0
SUM OF ALL RESPONSES TO PHQ QUESTIONS 1-9: 0
SUM OF ALL RESPONSES TO PHQ9 QUESTIONS 1 & 2: 0
SUM OF ALL RESPONSES TO PHQ QUESTIONS 1-9: 0
SUM OF ALL RESPONSES TO PHQ QUESTIONS 1-9: 0
2. FEELING DOWN, DEPRESSED OR HOPELESS: 0

## 2024-01-29 NOTE — PROGRESS NOTES
Chief Complaint   Patient presents with    Migraine     Started FRiday       Vitals:    01/29/24 1608   BP: 110/80   Pulse: 63   Resp: 18   Temp: 98.4 °F (36.9 °C)   SpO2: 98%     Patient was administered Toradol via IM injection.  Patient tolerated medication without noted side effects.  Medication information reviewed with patient, patient states understanding. Patient to resume routine medications at home.  Patient given copy of AVS with medication information and instructions for home.  AVS reviewed with patient and patient states understanding.

## 2024-01-29 NOTE — PROGRESS NOTES
\"Have you been to the ER, urgent care clinic since your last visit?  Hospitalized since your last visit?\"    NO    “Have you seen or consulted any other health care providers outside of [facility] since your last visit?”    NO

## 2024-01-30 ENCOUNTER — TELEPHONE (OUTPATIENT)
Age: 54
End: 2024-01-30

## 2024-01-30 NOTE — TELEPHONE ENCOUNTER
----- Message from Maryuri Philippe sent at 1/30/2024  2:16 PM EST -----  Subject: Message to Provider    QUESTIONS  Information for Provider? PT NEEDS A DOCTORS NOTE TO RETURN BACK TO WORK.   PT SAW PCP ON 01.29.23. PT WILL COME AND  NOTE.   ---------------------------------------------------------------------------  --------------  CALL BACK INFO  [phone]; OK to leave message on voicemail  ---------------------------------------------------------------------------  --------------  SCRIPT ANSWERS  Relationship to Patient? Self

## 2024-01-31 RX ORDER — PRAVASTATIN SODIUM 10 MG
TABLET ORAL
Qty: 90 TABLET | Refills: 0 | Status: SHIPPED | OUTPATIENT
Start: 2024-01-31

## 2024-01-31 RX ORDER — SPIRONOLACTONE 25 MG/1
25 TABLET ORAL DAILY
Qty: 90 TABLET | Refills: 0 | Status: SHIPPED | OUTPATIENT
Start: 2024-01-31

## 2024-01-31 NOTE — TELEPHONE ENCOUNTER
Patient requesting refill on     Requested Prescriptions     Pending Prescriptions Disp Refills    spironolactone (ALDACTONE) 25 MG tablet [Pharmacy Med Name: Spironolactone 25 MG Oral Tablet] 90 tablet 0     Sig: Take 1 tablet by mouth once daily    pravastatin (PRAVACHOL) 10 MG tablet [Pharmacy Med Name: Pravastatin Sodium 10 MG Oral Tablet] 90 tablet 0     Sig: Take 1 tablet by mouth once daily        Last OV 1/29/2024

## 2024-02-01 NOTE — PROGRESS NOTES
Subjective:      Reyna Benavides is a 53 y.o. female who presents for evaluation of headache. Symptoms began about 3 days ago. Generally, the headaches last about several days and occur  more often close to continuously  . The headaches do not seem to be related to any time of the day. The headaches are usually pounding, sharp, and throbbing and are located in generalized .  Symptoms wax and wane.The patient rates her most severe headaches a 8 on a scale from 1 to 10. Recently, the headaches have been increasing in both severity and frequency. Work attendance or other daily activities are affected by the headaches. Precipitating factors include: none which have been determined. The headaches are usually not preceded by an aura. Associated neurologic symptoms: dizziness and fatigue . The patient denies muscle weakness. Home treatment has included fioricet, Imitrex oral, and zofran, resting, and sleeping with some improvement. Other history includes: migraine headaches diagnosed in the past. Vazquez Benavides endorses she also has a hx of sleep agnea Family history includes no known family members with significant headaches.    Past Medical History:   Diagnosis Date    CAD (coronary artery disease) 10/2013    Colon polyps     Fatty liver     GERD (gastroesophageal reflux disease)     Graves disease     hypothyroid now since procedure    H. pylori infection     Headache     sometimes migraines    Hypertension     Hypokalemia     Menopause     Nausea & vomiting     Pneumothorax     2012    Sleep apnea     cpap compliant    Vitamin D deficiency      Patient Active Problem List    Diagnosis Date Noted    Biliary dyskinesia 11/03/2019    Graves disease     Severe obesity (HCC) 12/27/2018    Sleep apnea 09/06/2017    Hypokalemia     Vitamin D deficiency     Hypertension     Back pain 09/08/2016    Hypothyroidism 04/15/2015    Vertigo 03/17/2014    Abnormal vaginal bleeding 01/07/2014    Pelvic mass 01/07/2014    Migraine headache

## 2024-03-04 ENCOUNTER — OFFICE VISIT (OUTPATIENT)
Age: 54
End: 2024-03-04
Payer: COMMERCIAL

## 2024-03-04 VITALS
SYSTOLIC BLOOD PRESSURE: 150 MMHG | HEART RATE: 67 BPM | RESPIRATION RATE: 18 BRPM | OXYGEN SATURATION: 100 % | HEIGHT: 64 IN | BODY MASS INDEX: 33.8 KG/M2 | WEIGHT: 198 LBS | TEMPERATURE: 98.8 F | DIASTOLIC BLOOD PRESSURE: 84 MMHG

## 2024-03-04 DIAGNOSIS — G43.011 INTRACTABLE MIGRAINE WITHOUT AURA AND WITH STATUS MIGRAINOSUS: Primary | ICD-10-CM

## 2024-03-04 PROCEDURE — 96372 THER/PROPH/DIAG INJ SC/IM: CPT

## 2024-03-04 PROCEDURE — 3077F SYST BP >= 140 MM HG: CPT

## 2024-03-04 PROCEDURE — 3079F DIAST BP 80-89 MM HG: CPT

## 2024-03-04 PROCEDURE — 99214 OFFICE O/P EST MOD 30 MIN: CPT

## 2024-03-04 RX ORDER — DEXAMETHASONE SODIUM PHOSPHATE 4 MG/ML
4 INJECTION, SOLUTION INTRA-ARTICULAR; INTRALESIONAL; INTRAMUSCULAR; INTRAVENOUS; SOFT TISSUE ONCE
Status: DISCONTINUED | OUTPATIENT
Start: 2024-03-04 | End: 2024-03-04

## 2024-03-04 RX ORDER — SUMATRIPTAN 50 MG/1
100 TABLET, FILM COATED ORAL
Qty: 20 TABLET | Refills: 0 | Status: SHIPPED | OUTPATIENT
Start: 2024-03-04 | End: 2024-03-04

## 2024-03-04 RX ORDER — TOPIRAMATE 50 MG/1
50 TABLET, FILM COATED ORAL 2 TIMES DAILY
Qty: 180 TABLET | Refills: 0 | Status: SHIPPED | OUTPATIENT
Start: 2024-03-04

## 2024-03-04 RX ORDER — DEXAMETHASONE SODIUM PHOSPHATE 4 MG/ML
4 INJECTION, SOLUTION INTRA-ARTICULAR; INTRALESIONAL; INTRAMUSCULAR; INTRAVENOUS; SOFT TISSUE ONCE
Status: COMPLETED | OUTPATIENT
Start: 2024-03-04 | End: 2024-03-04

## 2024-03-04 RX ORDER — KETOROLAC TROMETHAMINE 30 MG/ML
30 INJECTION, SOLUTION INTRAMUSCULAR; INTRAVENOUS ONCE
Status: COMPLETED | OUTPATIENT
Start: 2024-03-04 | End: 2024-03-04

## 2024-03-04 RX ADMIN — KETOROLAC TROMETHAMINE 30 MG: 30 INJECTION, SOLUTION INTRAMUSCULAR; INTRAVENOUS at 16:10

## 2024-03-04 RX ADMIN — DEXAMETHASONE SODIUM PHOSPHATE 4 MG: 4 INJECTION, SOLUTION INTRA-ARTICULAR; INTRALESIONAL; INTRAMUSCULAR; INTRAVENOUS; SOFT TISSUE at 16:08

## 2024-03-04 ASSESSMENT — PATIENT HEALTH QUESTIONNAIRE - PHQ9
SUM OF ALL RESPONSES TO PHQ QUESTIONS 1-9: 0
1. LITTLE INTEREST OR PLEASURE IN DOING THINGS: 0
2. FEELING DOWN, DEPRESSED OR HOPELESS: 0
SUM OF ALL RESPONSES TO PHQ QUESTIONS 1-9: 0
SUM OF ALL RESPONSES TO PHQ9 QUESTIONS 1 & 2: 0

## 2024-03-04 ASSESSMENT — ENCOUNTER SYMPTOMS
VOMITING: 0
EYES NEGATIVE: 1
PHOTOPHOBIA: 0
NAUSEA: 1

## 2024-03-04 NOTE — PROGRESS NOTES
Chief Complaint   Patient presents with    Migraine     X 4 days       HPI:     is a 53 y.o. female who presents for an acute visit.      She endorses frontal headache which began three days ago; history of migraine headaches and cluster headaches which had improved until about three months ago.  She is taking sumatriptan, Reglan, and Fioricet for headache with moderate but temporary relief of pain; she is using topiramate BID for prophylaxis but continues to have 10-15 headaches days/month for the past 3 months.  She saw neuro in the remote past (10+ years ago), but not recently.      Today, she took sumatriptan 50mg and Reglan 10mg about 2 hours prior to arrival.    Allergies   Allergen Reactions    Hydromorphone      Other reaction(s): Unknown (comments)  Respiratory suppression leading to intubation       Current Outpatient Medications   Medication Sig Dispense Refill    SUMAtriptan (IMITREX) 50 MG tablet Take 2 tablets by mouth once as needed for Migraine Take 2 tablets by mouth at onset of migraine; may repeat dose in 2 hours if needed. Max daily dose 200mg. 20 tablet 0    topiramate (TOPAMAX) 50 MG tablet Take 1 tablet by mouth 2 times daily 180 tablet 0    spironolactone (ALDACTONE) 25 MG tablet Take 1 tablet by mouth once daily 90 tablet 0    pravastatin (PRAVACHOL) 10 MG tablet Take 1 tablet by mouth once daily 90 tablet 0    Cholecalciferol (VITAMIN D) 50 MCG (2000 UT) CAPS capsule Take by mouth      butalbital-APAP-caffeine (FIORICET) -40 MG CAPS per capsule Take 1 capsule by mouth every 6 hours as needed for Headaches 12 capsule 2    ondansetron (ZOFRAN) 4 MG tablet Take 1 tablet by mouth every 8 hours as needed for Nausea or Vomiting 20 tablet 2    metoclopramide (REGLAN) 10 MG tablet Take 1 tablet by mouth every 6 hours as needed (nausea with migraine) 90 tablet 0    benazepril (LOTENSIN) 40 MG tablet TAKE 1 TABLET BY MOUTH ONCE DAILY FOR HIGH BLOOD PRESSURE 90 tablet 0    amLODIPine

## 2024-03-04 NOTE — PROGRESS NOTES
\"Have you been to the ER, urgent care clinic since your last visit?  Hospitalized since your last visit?\"    [facility] 3/3/24 Toradol     “Have you seen or consulted any other health care providers outside of [facility] since your last visit?”    NO       '  Chief Complaint   Patient presents with    Migraine       Vitals:    03/04/24 0823   BP: (!) 150/84   Pulse: 67   Resp: 18   Temp: 98.8 °F (37.1 °C)   SpO2: 100%

## 2024-03-05 ENCOUNTER — TELEPHONE (OUTPATIENT)
Age: 54
End: 2024-03-05

## 2024-03-05 RX ORDER — POTASSIUM CHLORIDE 20 MEQ/1
20 TABLET, EXTENDED RELEASE ORAL DAILY
Qty: 90 TABLET | Refills: 1 | Status: SHIPPED | OUTPATIENT
Start: 2024-03-05

## 2024-03-05 NOTE — TELEPHONE ENCOUNTER
Patient requesting a refill on her potassium, but it has been d/johan in chart. Patient os out and states she takes as needed  for when my potassium drops she gets Headaches  per patient.

## 2024-03-05 NOTE — TELEPHONE ENCOUNTER
Medication Refill Request    Reyna Benavides is requesting a refill of the following medication(s):   potassium chloride (KLOR-CON M) 20 MEQ extended release tablet   Please send refill to:     [facility] [address] [place] [address] - [address] [facility] -  [phone] - F [phone]  [address] [facility] [address]  Phone: [phone] Fax: [phone]

## 2024-03-20 ENCOUNTER — OFFICE VISIT (OUTPATIENT)
Age: 54
End: 2024-03-20
Payer: COMMERCIAL

## 2024-03-20 VITALS
HEIGHT: 64 IN | TEMPERATURE: 97.5 F | WEIGHT: 201 LBS | SYSTOLIC BLOOD PRESSURE: 110 MMHG | DIASTOLIC BLOOD PRESSURE: 72 MMHG | OXYGEN SATURATION: 98 % | RESPIRATION RATE: 18 BRPM | HEART RATE: 72 BPM | BODY MASS INDEX: 34.31 KG/M2

## 2024-03-20 DIAGNOSIS — R82.90 ABNORMAL URINE: ICD-10-CM

## 2024-03-20 DIAGNOSIS — N89.8 VAGINAL ODOR: Primary | ICD-10-CM

## 2024-03-20 LAB
BILIRUBIN, URINE, POC: NEGATIVE
BLOOD URINE, POC: NEGATIVE
GLUCOSE URINE, POC: NEGATIVE
KETONES, URINE, POC: NEGATIVE
LEUKOCYTE ESTERASE, URINE, POC: NEGATIVE
NITRITE, URINE, POC: NEGATIVE
PH, URINE, POC: 7 (ref 4.6–8)
PROTEIN,URINE, POC: NEGATIVE
SPECIFIC GRAVITY, URINE, POC: 1.02 (ref 1–1.03)
URINALYSIS CLARITY, POC: CLEAR
URINALYSIS COLOR, POC: YELLOW
UROBILINOGEN, POC: NORMAL

## 2024-03-20 PROCEDURE — 3074F SYST BP LT 130 MM HG: CPT | Performed by: NURSE PRACTITIONER

## 2024-03-20 PROCEDURE — 81001 URINALYSIS AUTO W/SCOPE: CPT | Performed by: NURSE PRACTITIONER

## 2024-03-20 PROCEDURE — 99213 OFFICE O/P EST LOW 20 MIN: CPT | Performed by: NURSE PRACTITIONER

## 2024-03-20 PROCEDURE — 3078F DIAST BP <80 MM HG: CPT | Performed by: NURSE PRACTITIONER

## 2024-03-20 RX ORDER — AMLODIPINE BESYLATE 10 MG/1
TABLET ORAL
Qty: 90 TABLET | Refills: 0 | Status: SHIPPED | OUTPATIENT
Start: 2024-03-20

## 2024-03-20 RX ORDER — BENAZEPRIL HYDROCHLORIDE 40 MG/1
40 TABLET ORAL DAILY
Qty: 90 TABLET | Refills: 0 | Status: SHIPPED | OUTPATIENT
Start: 2024-03-20

## 2024-03-20 ASSESSMENT — PATIENT HEALTH QUESTIONNAIRE - PHQ9
2. FEELING DOWN, DEPRESSED OR HOPELESS: NOT AT ALL
SUM OF ALL RESPONSES TO PHQ9 QUESTIONS 1 & 2: 0
SUM OF ALL RESPONSES TO PHQ QUESTIONS 1-9: 0
1. LITTLE INTEREST OR PLEASURE IN DOING THINGS: NOT AT ALL

## 2024-03-20 NOTE — PROGRESS NOTES
Subjective:   53 y.o. female complains of malodorous vaginal discharge for several days..  Denies abnormal vaginal bleeding or significant pelvic pain or  fever. No UTI symptoms. Denies history of known exposure to STD.    No LMP recorded. Patient has had a hysterectomy.    Objective:   she appears well, afebrile.  Abdomen: benign, soft, nontender, no masses.  Pelvic Exam: normal external genitalia, vulva, vagina, , thick white malodorous discharge noted.        Assessment/Plan:   bacterial vaginosis versus nonspecific vaginitis    Treatment: pending testing. Consider azithromycin /doxycycline   ROV prn if symptoms persist or worsen.      Diagnosis Orders   1. Vaginal odor  Bacterial Vaginosis Panel    Bacterial Vaginosis Panel      2. Abnormal urine  AMB POC URINALYSIS DIP STICK AUTO W/ MICRO    Culture, Ureaplasma/Mycoplasma hominis    Culture, Ureaplasma/Mycoplasma hominis        Encounter Diagnoses   Name Primary?    Vaginal odor Yes    Abnormal urine      Orders Placed This Encounter   Procedures    Culture, Ureaplasma/Mycoplasma hominis     Standing Status:   Future     Number of Occurrences:   1     Standing Expiration Date:   3/20/2025    Bacterial Vaginosis Panel     Standing Status:   Future     Number of Occurrences:   1     Standing Expiration Date:   3/20/2025    AMB POC URINALYSIS DIP STICK AUTO W/ MICRO     Reyna was seen today for uti symptoms.    Diagnoses and all orders for this visit:    Vaginal odor  -     Bacterial Vaginosis Panel; Future  -     Bacterial Vaginosis Panel    Abnormal urine  -     AMB POC URINALYSIS DIP STICK AUTO W/ MICRO  -     Culture, Ureaplasma/Mycoplasma hominis; Future  -     Culture, Ureaplasma/Mycoplasma hominis      Return if symptoms worsen or fail to improve.    Asya ROWE

## 2024-03-20 NOTE — PROGRESS NOTES
\"Have you been to the ER, urgent care clinic since your last visit?  Hospitalized since your last visit?\"    NO    “Have you seen or consulted any other health care providers outside of [facility] since your last visit?”    NO  Chief Complaint   Patient presents with    UTI symptoms       Vitals:    03/20/24 1617   BP: 110/72   Pulse: 72   Resp: 18   Temp: 97.5 °F (36.4 °C)   SpO2: 98%               Click Here for Release of Records Request

## 2024-03-24 LAB
A VAGINAE DNA VAG QL NAA+PROBE: NORMAL SCORE
BVAB2 DNA VAG QL NAA+PROBE: NORMAL SCORE
M GENITALIUM DNA SPEC QL NAA+PROBE: NEGATIVE
MEGA1 DNA VAG QL NAA+PROBE: NORMAL SCORE
SPECIMEN SOURCE: NORMAL

## 2024-03-25 ENCOUNTER — TELEPHONE (OUTPATIENT)
Age: 54
End: 2024-03-25

## 2024-03-25 NOTE — TELEPHONE ENCOUNTER
MICHELE CHAMBERLAIN Ms Benavides, informed of 1 test result is back, has not been resulted out by Asya.  The other test is stating preliminary result, not completed as of yet.  I will still send the message on to Asya, OK and thanks.

## 2024-03-25 NOTE — TELEPHONE ENCOUNTER
Pt states she was told to call this afternoon to get lab results if she hadn't heard anything all day

## 2024-03-26 LAB
M GENITALIUM DNA SPEC QL NAA+PROBE: NEGATIVE
M HOMINIS DNA SPEC QL NAA+PROBE: NEGATIVE
UREAPLASMA DNA SPEC QL NAA+PROBE: NEGATIVE

## 2024-04-03 ENCOUNTER — TELEPHONE (OUTPATIENT)
Age: 54
End: 2024-04-03

## 2024-04-03 NOTE — TELEPHONE ENCOUNTER
Pt called to let PCP know that she finished metroNIDAZOLE (FLAGYL) 500 MG tablet  and she states that it did not work.

## 2024-04-29 RX ORDER — SPIRONOLACTONE 25 MG/1
25 TABLET ORAL DAILY
Qty: 90 TABLET | Refills: 0 | Status: SHIPPED | OUTPATIENT
Start: 2024-04-29

## 2024-04-29 RX ORDER — PRAVASTATIN SODIUM 10 MG
TABLET ORAL
Qty: 90 TABLET | Refills: 0 | Status: SHIPPED | OUTPATIENT
Start: 2024-04-29

## 2024-05-02 ENCOUNTER — OFFICE VISIT (OUTPATIENT)
Age: 54
End: 2024-05-02
Payer: COMMERCIAL

## 2024-05-02 VITALS
HEART RATE: 75 BPM | TEMPERATURE: 98 F | WEIGHT: 191 LBS | RESPIRATION RATE: 22 BRPM | HEIGHT: 64 IN | DIASTOLIC BLOOD PRESSURE: 70 MMHG | BODY MASS INDEX: 32.61 KG/M2 | OXYGEN SATURATION: 98 % | SYSTOLIC BLOOD PRESSURE: 120 MMHG

## 2024-05-02 DIAGNOSIS — N89.8 VAGINAL DISCHARGE: Primary | ICD-10-CM

## 2024-05-02 DIAGNOSIS — R92.8 ABNORMAL MAMMOGRAM: ICD-10-CM

## 2024-05-02 PROCEDURE — 99213 OFFICE O/P EST LOW 20 MIN: CPT | Performed by: NURSE PRACTITIONER

## 2024-05-02 PROCEDURE — 3074F SYST BP LT 130 MM HG: CPT | Performed by: NURSE PRACTITIONER

## 2024-05-02 PROCEDURE — 3078F DIAST BP <80 MM HG: CPT | Performed by: NURSE PRACTITIONER

## 2024-05-02 ASSESSMENT — PATIENT HEALTH QUESTIONNAIRE - PHQ9
SUM OF ALL RESPONSES TO PHQ QUESTIONS 1-9: 0
1. LITTLE INTEREST OR PLEASURE IN DOING THINGS: NOT AT ALL
SUM OF ALL RESPONSES TO PHQ QUESTIONS 1-9: 0
SUM OF ALL RESPONSES TO PHQ9 QUESTIONS 1 & 2: 0
SUM OF ALL RESPONSES TO PHQ QUESTIONS 1-9: 0
2. FEELING DOWN, DEPRESSED OR HOPELESS: NOT AT ALL
SUM OF ALL RESPONSES TO PHQ QUESTIONS 1-9: 0

## 2024-05-02 NOTE — PROGRESS NOTES
\"Have you been to the ER, urgent care clinic since your last visit?  Hospitalized since your last visit?\"    NO    “Have you seen or consulted any other health care providers outside of [facility] since your last visit?”    NO            Click Here for Release of Records Request      Chief Complaint   Patient presents with    Vaginal Discharge     And odor         Vitals:    05/02/24 1337   BP: 120/70   Pulse: 75   Resp: 22   Temp: 98 °F (36.7 °C)   SpO2: 98%

## 2024-05-06 ENCOUNTER — OFFICE VISIT (OUTPATIENT)
Age: 54
End: 2024-05-06
Payer: COMMERCIAL

## 2024-05-06 VITALS
TEMPERATURE: 98.4 F | DIASTOLIC BLOOD PRESSURE: 90 MMHG | BODY MASS INDEX: 32.54 KG/M2 | OXYGEN SATURATION: 99 % | SYSTOLIC BLOOD PRESSURE: 140 MMHG | RESPIRATION RATE: 18 BRPM | WEIGHT: 190.6 LBS | HEART RATE: 64 BPM | HEIGHT: 64 IN

## 2024-05-06 DIAGNOSIS — R22.0 MOUTH SWELLING: ICD-10-CM

## 2024-05-06 DIAGNOSIS — K04.7 DENTAL INFECTION: Primary | ICD-10-CM

## 2024-05-06 DIAGNOSIS — K13.79 MOUTH PAIN: ICD-10-CM

## 2024-05-06 PROCEDURE — 3077F SYST BP >= 140 MM HG: CPT | Performed by: NURSE PRACTITIONER

## 2024-05-06 PROCEDURE — 99213 OFFICE O/P EST LOW 20 MIN: CPT | Performed by: NURSE PRACTITIONER

## 2024-05-06 PROCEDURE — 3079F DIAST BP 80-89 MM HG: CPT | Performed by: NURSE PRACTITIONER

## 2024-05-06 PROCEDURE — 96372 THER/PROPH/DIAG INJ SC/IM: CPT | Performed by: NURSE PRACTITIONER

## 2024-05-06 RX ORDER — AMOXICILLIN AND CLAVULANATE POTASSIUM 400; 57 MG/5ML; MG/5ML
400 POWDER, FOR SUSPENSION ORAL 3 TIMES DAILY
Qty: 150 ML | Refills: 0 | Status: SHIPPED | OUTPATIENT
Start: 2024-05-06 | End: 2024-05-16

## 2024-05-06 RX ORDER — AMOXICILLIN 500 MG/1
500 CAPSULE ORAL 3 TIMES DAILY
COMMUNITY
Start: 2024-03-29

## 2024-05-06 RX ORDER — KETOROLAC TROMETHAMINE 15 MG/ML
15 INJECTION, SOLUTION INTRAMUSCULAR; INTRAVENOUS
Status: SHIPPED | OUTPATIENT
Start: 2024-05-06 | End: 2024-05-07

## 2024-05-06 RX ORDER — IBUPROFEN 600 MG/1
600 TABLET ORAL EVERY 6 HOURS PRN
Qty: 40 TABLET | Refills: 0 | Status: SHIPPED | OUTPATIENT
Start: 2024-05-06 | End: 2024-05-16

## 2024-05-06 RX ADMIN — KETOROLAC TROMETHAMINE 15 MG: 30 INJECTION, SOLUTION INTRAMUSCULAR; INTRAVENOUS at 10:00

## 2024-05-06 ASSESSMENT — PATIENT HEALTH QUESTIONNAIRE - PHQ9
SUM OF ALL RESPONSES TO PHQ QUESTIONS 1-9: 0
1. LITTLE INTEREST OR PLEASURE IN DOING THINGS: NOT AT ALL
SUM OF ALL RESPONSES TO PHQ9 QUESTIONS 1 & 2: 0
SUM OF ALL RESPONSES TO PHQ QUESTIONS 1-9: 0
SUM OF ALL RESPONSES TO PHQ QUESTIONS 1-9: 0
2. FEELING DOWN, DEPRESSED OR HOPELESS: NOT AT ALL
SUM OF ALL RESPONSES TO PHQ QUESTIONS 1-9: 0

## 2024-05-06 NOTE — PROGRESS NOTES
Chief Complaint   Patient presents with    Facial Pain     Right side and swollen since the weekend  Yesterday spitting up blood and hard to swallow       Vitals:    05/06/24 1043   BP: (!) 158/80   Pulse: 64   Resp: 18   Temp: 98.4 °F (36.9 °C)   SpO2: 99%     \"Have you been to the ER, urgent care clinic since your last visit?  Hospitalized since your last visit?\"    NO    “Have you seen or consulted any other health care providers outside of [facility] since your last visit?”    NO

## 2024-05-07 RX ORDER — KETOROLAC TROMETHAMINE 30 MG/ML
15 INJECTION, SOLUTION INTRAMUSCULAR; INTRAVENOUS ONCE
Status: COMPLETED | OUTPATIENT
Start: 2024-05-07 | End: 2024-05-06

## 2024-05-07 NOTE — PROGRESS NOTES
Subjective:     Reyna Benavides is a 53 y.o. female who presents today with the following:  Chief Complaint   Patient presents with    Facial Pain     Right side and swollen since the weekend  Yesterday spitting up blood and hard to swallow       Patient Active Problem List   Diagnosis    Abnormal vaginal bleeding    Biliary dyskinesia    Graves disease    Pelvic mass    Hypothyroidism    Vertigo    Severe obesity (HCC)    Hypokalemia    Vitamin D deficiency    Hypertension    Migraine headache    Chronic coronary artery disease    Back pain    Benign hypertensive heart disease    Overweight    Sleep apnea         COMPLIANT WITH MEDICATION:   HTN; Denies chest pain, dyspnea, palpitations, headache and blurred vision. Blood pressure BP elevated today.    Dental and mouth pain.  Ms. Benavides endorses she had a dental procedure on 5/1/2024 a crown was placed  right back tooth. She was placed on antibiotic Amoxicllin . The  right side of her cheek ,neck and tongue are swollen.  She had went back to the dentist this morning and received another rx for amoxicillin.    Pertinent information she is currently on flagyl for vagina odor. Seen by gynecology .    depression screening addressed not at risk    abuse screening addressed denies    learning assessment addressed reviewed nurses notes    fall risk addressed not at risk    HM: addressed    ROS:  Gen: denies fever, chills, fatigue, weight loss, weight gain  HEENT:denies blurry vision, nasal congestion, sore throat  Resp: denies dypsnea, cough, wheezing  CV: denies chest pain radiating to the jaws or arms, palpitations, lower extremity edema  Abd: denies nausea, vomiting, diarrhea, constipation  Neuro: denies numbness/tingling  Endo: denies polyuria, polydipsia, heat/cold intolerance  Heme: no lymphadenopathy    Allergies   Allergen Reactions    Hydromorphone      Other reaction(s): Unknown (comments)  Respiratory suppression leading to intubation         Current Outpatient

## 2024-05-07 NOTE — PROGRESS NOTES
Ketorolac 15 mg / 0.5 ml  administered in right Dorsogluteal IM on 05/06/2024.  Patient tolerated medication well.  AVS provided to patient with medication information, states understanding.

## 2024-05-14 ENCOUNTER — OFFICE VISIT (OUTPATIENT)
Age: 54
End: 2024-05-14
Payer: COMMERCIAL

## 2024-05-14 VITALS
DIASTOLIC BLOOD PRESSURE: 78 MMHG | HEIGHT: 64 IN | SYSTOLIC BLOOD PRESSURE: 138 MMHG | WEIGHT: 189.8 LBS | HEART RATE: 86 BPM | BODY MASS INDEX: 32.4 KG/M2 | TEMPERATURE: 97.7 F | OXYGEN SATURATION: 98 % | RESPIRATION RATE: 20 BRPM

## 2024-05-14 DIAGNOSIS — K04.7 TOOTH INFECTION: ICD-10-CM

## 2024-05-14 DIAGNOSIS — K08.89 PAIN, DENTAL: ICD-10-CM

## 2024-05-14 DIAGNOSIS — K04.7 DENTAL INFECTION: Primary | ICD-10-CM

## 2024-05-14 DIAGNOSIS — K30 STOMACH UPSET: ICD-10-CM

## 2024-05-14 DIAGNOSIS — K59.00 CONSTIPATION, UNSPECIFIED CONSTIPATION TYPE: ICD-10-CM

## 2024-05-14 DIAGNOSIS — K13.79 MOUTH PAIN: ICD-10-CM

## 2024-05-14 PROCEDURE — 3078F DIAST BP <80 MM HG: CPT | Performed by: NURSE PRACTITIONER

## 2024-05-14 PROCEDURE — 96372 THER/PROPH/DIAG INJ SC/IM: CPT | Performed by: NURSE PRACTITIONER

## 2024-05-14 PROCEDURE — 3075F SYST BP GE 130 - 139MM HG: CPT | Performed by: NURSE PRACTITIONER

## 2024-05-14 PROCEDURE — 99214 OFFICE O/P EST MOD 30 MIN: CPT | Performed by: NURSE PRACTITIONER

## 2024-05-14 RX ORDER — CEFTRIAXONE 1 G/1
1000 INJECTION, POWDER, FOR SOLUTION INTRAMUSCULAR; INTRAVENOUS ONCE
Status: COMPLETED | OUTPATIENT
Start: 2024-05-14 | End: 2024-05-14

## 2024-05-14 RX ORDER — CLINDAMYCIN HYDROCHLORIDE 300 MG/1
CAPSULE ORAL
COMMUNITY

## 2024-05-14 RX ADMIN — CEFTRIAXONE 1000 MG: 1 INJECTION, POWDER, FOR SOLUTION INTRAMUSCULAR; INTRAVENOUS at 08:41

## 2024-05-14 ASSESSMENT — PATIENT HEALTH QUESTIONNAIRE - PHQ9
2. FEELING DOWN, DEPRESSED OR HOPELESS: NOT AT ALL
SUM OF ALL RESPONSES TO PHQ QUESTIONS 1-9: 0
1. LITTLE INTEREST OR PLEASURE IN DOING THINGS: NOT AT ALL
SUM OF ALL RESPONSES TO PHQ9 QUESTIONS 1 & 2: 0
SUM OF ALL RESPONSES TO PHQ QUESTIONS 1-9: 0

## 2024-05-14 NOTE — PROGRESS NOTES
Chest Pain: Care Instructions  Your Care Instructions  There are many things that can cause chest pain. Some are not serious and will get better on their own in a few days. But some kinds of chest pain need more testing and treatment. Your doctor may have recommended a follow-up visit in the next 8 to 12 hours. If you are not getting better, you may need more tests or treatment. Even though your doctor has released you, you still need to watch for any problems. The doctor carefully checked you, but sometimes problems can develop later. If you have new symptoms or if your symptoms do not get better, get medical care right away. If you have worse or different chest pain or pressure that lasts more than 5 minutes or you passed out (lost consciousness), call 911 or seek other emergency help right away. A medical visit is only one step in your treatment. Even if you feel better, you still need to do what your doctor recommends, such as going to all suggested follow-up appointments and taking medicines exactly as directed. This will help you recover and help prevent future problems. How can you care for yourself at home? · Rest until you feel better. · Take your medicine exactly as prescribed. Call your doctor if you think you are having a problem with your medicine. · Do not drive after taking a prescription pain medicine. When should you call for help? Call 911 if:  · You passed out (lost consciousness). · You have severe difficulty breathing. · You have symptoms of a heart attack. These may include:  ¨ Chest pain or pressure, or a strange feeling in your chest.  ¨ Sweating. ¨ Shortness of breath. ¨ Nausea or vomiting. ¨ Pain, pressure, or a strange feeling in your back, neck, jaw, or upper belly or in one or both shoulders or arms. ¨ Lightheadedness or sudden weakness. ¨ A fast or irregular heartbeat.   After you call 911, the  may tell you to chew 1 adult-strength or 2 to 4 low-dose Subjective:     Reyna Benavides is a 53 y.o. female who presents today with the following:  Chief Complaint   Patient presents with    Medication Management     Tooth and gum infection     tooth and gum        Patient Active Problem List   Diagnosis    Abnormal vaginal bleeding    Biliary dyskinesia    Graves disease    Pelvic mass    Hypothyroidism    Vertigo    Severe obesity (HCC)    Hypokalemia    Vitamin D deficiency    Hypertension    Migraine headache    Chronic coronary artery disease    Back pain    Benign hypertensive heart disease    Overweight    Sleep apnea         COMPLIANT WITH MEDICATION:   HTN; Denies chest pain, dyspnea, palpitations, headache and blurred vision. Blood pressure normotensive.    Tooth and gum  infection;  Pertinent hx   Dental and mouth pain.  Ms. Benavides endorses she had a dental procedure on 5/1/2024 a crown was placed  right back tooth. ? #32 .She was placed on antibiotic Amoxicllin . The  right side of her cheek ,neck and tongue were swollen.  She came to see PCP after visiting the dentist on 5/6/24 who gave her a rx for a second course of antibiotics. Treated with antiinflammatory medication Seen at ER  on 5/7/24 symptoms were persistent . She was unable to eat or drink adequately due to the swelling so she went to the Er for evaluation.   She was placed on clindamycin 300 mg po in the ER.  The antibiotics have upset her stomach.  She then went to  [facility]  in [place] . The provider removed a cotton roll that was deep in the tooth with fragments of cotton balls.  She endorses since the foreign objects were removed from her mouth the swelling has gone down some but she continues to have  some oral swelling and stomach upset.      Constipation- unable to have a BM for over a week.     depression screening addressed not at risk    abuse screening addressed denies    learning assessment addressed reviewed nurses notes    fall risk addressed not at risk    HM:  aspirin. Wait for an ambulance. Do not try to drive yourself. Call your doctor today if:  · You have any trouble breathing. · Your chest pain gets worse. · You are dizzy or lightheaded, or you feel like you may faint. · You are not getting better as expected. · You are having new or different chest pain. Where can you learn more? Go to http://yoel-osvaldo.info/. Enter A120 in the search box to learn more about \"Chest Pain: Care Instructions. \"  Current as of: May 27, 2016  Content Version: 11.1  © 0552-4405 [employer]. Care instructions adapted under license by [employer] (which disclaims liability or warranty for this information). If you have questions about a medical condition or this instruction, always ask your healthcare professional. [address] any warranty or liability for your use of this information.

## 2024-05-14 NOTE — PROGRESS NOTES
\"Have you been to the ER, urgent care clinic since your last visit?  Hospitalized since your last visit?\"    NO    “Have you seen or consulted any other health care providers outside of [facility] since your last visit?”    NO            Click Here for Release of Records Request

## 2024-05-30 DIAGNOSIS — G43.011 INTRACTABLE MIGRAINE WITHOUT AURA AND WITH STATUS MIGRAINOSUS: ICD-10-CM

## 2024-05-31 RX ORDER — TOPIRAMATE 50 MG/1
50 TABLET, FILM COATED ORAL 2 TIMES DAILY
Qty: 180 TABLET | Refills: 0 | Status: SHIPPED | OUTPATIENT
Start: 2024-05-31

## 2024-05-31 NOTE — TELEPHONE ENCOUNTER
Patient requesting refill on     Requested Prescriptions     Pending Prescriptions Disp Refills    topiramate (TOPAMAX) 50 MG tablet [Pharmacy Med Name: Topiramate 50 MG Oral Tablet] 180 tablet 0     Sig: Take 1 tablet by mouth twice daily        Last OV 3/4/2024

## 2024-06-21 RX ORDER — BENAZEPRIL HYDROCHLORIDE 40 MG/1
40 TABLET ORAL DAILY
Qty: 90 TABLET | Refills: 0 | Status: SHIPPED | OUTPATIENT
Start: 2024-06-21

## 2024-06-21 RX ORDER — AMLODIPINE BESYLATE 10 MG/1
TABLET ORAL
Qty: 90 TABLET | Refills: 0 | Status: SHIPPED | OUTPATIENT
Start: 2024-06-21

## 2024-06-21 NOTE — TELEPHONE ENCOUNTER
Patient requesting refill on     Requested Prescriptions     Pending Prescriptions Disp Refills    benazepril (LOTENSIN) 40 MG tablet [Pharmacy Med Name: Benazepril HCl 40 MG Oral Tablet] 90 tablet 0     Sig: TAKE 1 TABLET BY MOUTH ONCE DAILY FOR HIGH BLOOD PRESSURE    amLODIPine (NORVASC) 10 MG tablet [Pharmacy Med Name: amLODIPine Besylate 10 MG Oral Tablet] 90 tablet 0     Sig: Take 1 tablet by mouth once daily        Last OV 5/14/2024

## 2024-07-02 RX ORDER — CETIRIZINE HYDROCHLORIDE 10 MG/1
TABLET, FILM COATED ORAL
Qty: 90 TABLET | Refills: 0 | Status: SHIPPED | OUTPATIENT
Start: 2024-07-02

## 2024-07-02 NOTE — TELEPHONE ENCOUNTER
Patient requesting refill on     Requested Prescriptions     Pending Prescriptions Disp Refills    EQ ALLERGY RELIEF, CETIRIZINE, 10 MG tablet [Pharmacy Med Name: EQ Allergy Relief (Cetirizine) 10 MG Oral Tablet] 90 tablet 0     Sig: TAKE 1 TABLET BY MOUTH AT BEDTIME AS NEEDED FOR ALLERGIES        Last OV 5/14/2024

## 2024-07-26 ENCOUNTER — OFFICE VISIT (OUTPATIENT)
Age: 54
End: 2024-07-26
Payer: COMMERCIAL

## 2024-07-26 VITALS
WEIGHT: 196 LBS | OXYGEN SATURATION: 97 % | HEIGHT: 64 IN | SYSTOLIC BLOOD PRESSURE: 130 MMHG | HEART RATE: 74 BPM | RESPIRATION RATE: 16 BRPM | DIASTOLIC BLOOD PRESSURE: 78 MMHG | TEMPERATURE: 97.4 F | BODY MASS INDEX: 33.46 KG/M2

## 2024-07-26 DIAGNOSIS — L25.5 CONTACT DERMATITIS DUE TO PLANT: Primary | ICD-10-CM

## 2024-07-26 PROCEDURE — 3078F DIAST BP <80 MM HG: CPT | Performed by: NURSE PRACTITIONER

## 2024-07-26 PROCEDURE — 99214 OFFICE O/P EST MOD 30 MIN: CPT | Performed by: NURSE PRACTITIONER

## 2024-07-26 PROCEDURE — 3075F SYST BP GE 130 - 139MM HG: CPT | Performed by: NURSE PRACTITIONER

## 2024-07-26 RX ORDER — PRAVASTATIN SODIUM 10 MG
TABLET ORAL
Qty: 90 TABLET | Refills: 0 | Status: SHIPPED | OUTPATIENT
Start: 2024-07-26

## 2024-07-26 RX ORDER — SPIRONOLACTONE 25 MG/1
25 TABLET ORAL DAILY
Qty: 90 TABLET | Refills: 0 | Status: SHIPPED | OUTPATIENT
Start: 2024-07-26

## 2024-07-26 RX ORDER — PREDNISONE 10 MG/1
TABLET ORAL
Qty: 9 TABLET | Refills: 1 | Status: SHIPPED | OUTPATIENT
Start: 2024-07-26

## 2024-07-26 NOTE — PROGRESS NOTES
\"Have you been to the ER, urgent care clinic since your last visit?  Hospitalized since your last visit?\"    NO    “Have you seen or consulted any other health care providers outside of [facility] since your last visit?”    NO            Click Here for Release of Records Request      Chief Complaint   Patient presents with    Poison Ivy         Vitals:    07/26/24 1522   Pulse: 74   Resp: 16   Temp: 97.4 °F (36.3 °C)   SpO2: 97%     
assistance, CN 2-12 grossly intact.    Skin: intact and warm to the touch, grouped erythematous papules to forearms  Lymphadenopathy: no cervical or supraclavicular nodes  Psych: Pleasant and appropriate. Judgment normal. Alert and oriented x 3.    Medication Side Effects and Warnings were discussed with patient:  yes  Patient Labs were reviewed:  yes  Patient Past Records were reviewed:  yes    Ariana Bahena, SHARAD - NP

## 2024-09-03 ENCOUNTER — OFFICE VISIT (OUTPATIENT)
Age: 54
End: 2024-09-03
Payer: COMMERCIAL

## 2024-09-03 VITALS
WEIGHT: 188 LBS | TEMPERATURE: 97.5 F | HEIGHT: 64 IN | SYSTOLIC BLOOD PRESSURE: 118 MMHG | OXYGEN SATURATION: 99 % | BODY MASS INDEX: 32.1 KG/M2 | HEART RATE: 78 BPM | RESPIRATION RATE: 20 BRPM | DIASTOLIC BLOOD PRESSURE: 70 MMHG

## 2024-09-03 DIAGNOSIS — G43.011 INTRACTABLE MIGRAINE WITHOUT AURA AND WITH STATUS MIGRAINOSUS: ICD-10-CM

## 2024-09-03 DIAGNOSIS — R10.13 DYSPEPSIA: Primary | ICD-10-CM

## 2024-09-03 PROCEDURE — 96372 THER/PROPH/DIAG INJ SC/IM: CPT

## 2024-09-03 PROCEDURE — 99214 OFFICE O/P EST MOD 30 MIN: CPT

## 2024-09-03 PROCEDURE — 3078F DIAST BP <80 MM HG: CPT

## 2024-09-03 PROCEDURE — 3074F SYST BP LT 130 MM HG: CPT

## 2024-09-03 RX ORDER — KETOROLAC TROMETHAMINE 30 MG/ML
30 INJECTION, SOLUTION INTRAMUSCULAR; INTRAVENOUS ONCE
Status: COMPLETED | OUTPATIENT
Start: 2024-09-03 | End: 2024-09-03

## 2024-09-03 RX ORDER — BUTALBITAL, ACETAMINOPHEN AND CAFFEINE 300; 40; 50 MG/1; MG/1; MG/1
CAPSULE ORAL
Qty: 12 CAPSULE | Refills: 0 | OUTPATIENT
Start: 2024-09-03

## 2024-09-03 RX ORDER — BUTALBITAL, ACETAMINOPHEN AND CAFFEINE 300; 40; 50 MG/1; MG/1; MG/1
1 CAPSULE ORAL EVERY 6 HOURS PRN
Qty: 12 CAPSULE | Refills: 2 | Status: SHIPPED | OUTPATIENT
Start: 2024-09-03

## 2024-09-03 RX ORDER — SUMATRIPTAN 100 MG/1
TABLET, FILM COATED ORAL
Qty: 20 TABLET | Refills: 1 | Status: SHIPPED | OUTPATIENT
Start: 2024-09-03

## 2024-09-03 RX ORDER — DEXAMETHASONE SODIUM PHOSPHATE 4 MG/ML
4 INJECTION, SOLUTION INTRA-ARTICULAR; INTRALESIONAL; INTRAMUSCULAR; INTRAVENOUS; SOFT TISSUE ONCE
Status: COMPLETED | OUTPATIENT
Start: 2024-09-03 | End: 2024-09-03

## 2024-09-03 RX ADMIN — KETOROLAC TROMETHAMINE 30 MG: 30 INJECTION, SOLUTION INTRAMUSCULAR; INTRAVENOUS at 14:17

## 2024-09-03 RX ADMIN — DEXAMETHASONE SODIUM PHOSPHATE 4 MG: 4 INJECTION, SOLUTION INTRA-ARTICULAR; INTRALESIONAL; INTRAMUSCULAR; INTRAVENOUS; SOFT TISSUE at 14:18

## 2024-09-03 ASSESSMENT — ENCOUNTER SYMPTOMS
VOMITING: 0
ALLERGIC/IMMUNOLOGIC NEGATIVE: 1
EYES NEGATIVE: 1
NAUSEA: 1
CONSTIPATION: 0
COUGH: 0
BLOOD IN STOOL: 0
RESPIRATORY NEGATIVE: 1
DIARRHEA: 0
ABDOMINAL PAIN: 1
WHEEZING: 0
SHORTNESS OF BREATH: 0

## 2024-09-03 NOTE — PROGRESS NOTES
\"Have you been to the ER, urgent care clinic since your last visit?  Hospitalized since your last visit?\"    NO    “Have you seen or consulted any other health care providers outside of [facility] since your last visit?”    NO      Chief Complaint   Patient presents with    Migraine     3 days        Vitals:    09/03/24 1342   BP: 118/70   Pulse: 78   Resp: 20   Temp: 97.5 °F (36.4 °C)   SpO2: 99%     Click Here for Release of Records Request  '

## 2024-09-03 NOTE — PROGRESS NOTES
Chief Complaint   Patient presents with    Migraine     3 days        HPI:     is a 53 y.o. female who presents for an acute visit.      She endorses frontal headache which began about three days ago with history of migraine headaches and cluster headaches; last headache was about 8 weeks ago.  She is taking sumatriptan, Reglan, and Fioricet for headache with moderate but temporary relief of pain; she saw neuro in the remote past (10+ years ago) and was referred once again several months ago, but has not scheduled.    She also notes some epigastric pain after eating spaghetti sauce or pizza; she takes Tums for this with good relief.  She denies melena, hematochezia, hematemesis, weight loss, dysphagia.    Allergies   Allergen Reactions    Hydromorphone      Other reaction(s): Unknown (comments)  Respiratory suppression leading to intubation       Current Outpatient Medications   Medication Sig Dispense Refill    SUMAtriptan (IMITREX) 100 MG tablet Take 2 tablets by mouth at onset of migraine; may repeat dose in 2 hours if needed. Max daily dose 200mg. 20 tablet 1    butalbital-APAP-caffeine (FIORICET) -40 MG CAPS per capsule Take 1 capsule by mouth every 6 hours as needed for Headaches 12 capsule 2    pravastatin (PRAVACHOL) 10 MG tablet Take 1 tablet by mouth once daily 90 tablet 0    spironolactone (ALDACTONE) 25 MG tablet Take 1 tablet by mouth once daily 90 tablet 0    predniSONE (DELTASONE) 10 MG tablet 2 tab PO QD x 3 days, then 1 tab PO QD x 3 days 9 tablet 1    EQ ALLERGY RELIEF, CETIRIZINE, 10 MG tablet TAKE 1 TABLET BY MOUTH AT BEDTIME AS NEEDED FOR ALLERGIES 90 tablet 0    benazepril (LOTENSIN) 40 MG tablet TAKE 1 TABLET BY MOUTH ONCE DAILY FOR HIGH BLOOD PRESSURE 90 tablet 0    amLODIPine (NORVASC) 10 MG tablet Take 1 tablet by mouth once daily 90 tablet 0    topiramate (TOPAMAX) 50 MG tablet Take 1 tablet by mouth twice daily 180 tablet 0    clindamycin (CLEOCIN) 300 MG capsule TAKE 1

## 2024-09-03 NOTE — ASSESSMENT & PLAN NOTE
Toradol and dexamethasone in office today; increase sumatriptan dose to 100mg at onset of headache, repeat in 2 hours if needed.  Encourage her to establish with new neurology provider.    Orders:    SUMAtriptan (IMITREX) 100 MG tablet; Take 2 tablets by mouth at onset of migraine; may repeat dose in 2 hours if needed. Max daily dose 200mg.    butalbital-APAP-caffeine (FIORICET) -40 MG CAPS per capsule; Take 1 capsule by mouth every 6 hours as needed for Headaches    Amb External Referral To Neurology    ketorolac (TORADOL) injection 30 mg    dexAMETHasone (DECADRON) injection 4 mg

## 2024-09-04 DIAGNOSIS — G43.011 INTRACTABLE MIGRAINE WITHOUT AURA AND WITH STATUS MIGRAINOSUS: ICD-10-CM

## 2024-09-05 RX ORDER — TOPIRAMATE 50 MG/1
50 TABLET, FILM COATED ORAL 2 TIMES DAILY
Qty: 180 TABLET | Refills: 0 | Status: SHIPPED | OUTPATIENT
Start: 2024-09-05

## 2024-09-24 ENCOUNTER — OFFICE VISIT (OUTPATIENT)
Age: 54
End: 2024-09-24
Payer: COMMERCIAL

## 2024-09-24 VITALS
RESPIRATION RATE: 18 BRPM | WEIGHT: 193 LBS | BODY MASS INDEX: 32.95 KG/M2 | HEART RATE: 75 BPM | HEIGHT: 64 IN | OXYGEN SATURATION: 99 % | TEMPERATURE: 98.2 F | SYSTOLIC BLOOD PRESSURE: 120 MMHG | DIASTOLIC BLOOD PRESSURE: 70 MMHG

## 2024-09-24 DIAGNOSIS — H53.10 EYE STRAIN, RIGHT: Primary | ICD-10-CM

## 2024-09-24 PROCEDURE — 3074F SYST BP LT 130 MM HG: CPT | Performed by: NURSE PRACTITIONER

## 2024-09-24 PROCEDURE — 3078F DIAST BP <80 MM HG: CPT | Performed by: NURSE PRACTITIONER

## 2024-09-24 PROCEDURE — 99213 OFFICE O/P EST LOW 20 MIN: CPT | Performed by: NURSE PRACTITIONER

## 2024-09-24 RX ORDER — AMLODIPINE BESYLATE 10 MG/1
TABLET ORAL
Qty: 90 TABLET | Refills: 0 | Status: SHIPPED | OUTPATIENT
Start: 2024-09-24

## 2024-09-24 RX ORDER — SUMATRIPTAN 50 MG/1
TABLET, FILM COATED ORAL
Qty: 9 TABLET | Refills: 3 | Status: SHIPPED | OUTPATIENT
Start: 2024-09-24

## 2024-09-24 RX ORDER — BENAZEPRIL HYDROCHLORIDE 40 MG/1
40 TABLET ORAL DAILY
Qty: 90 TABLET | Refills: 0 | Status: SHIPPED | OUTPATIENT
Start: 2024-09-24

## 2024-09-24 SDOH — ECONOMIC STABILITY: TRANSPORTATION INSECURITY
IN THE PAST 12 MONTHS, HAS LACK OF TRANSPORTATION KEPT YOU FROM MEETINGS, WORK, OR FROM GETTING THINGS NEEDED FOR DAILY LIVING?: NO

## 2024-09-24 SDOH — ECONOMIC STABILITY: INCOME INSECURITY: IN THE LAST 12 MONTHS, WAS THERE A TIME WHEN YOU WERE NOT ABLE TO PAY THE MORTGAGE OR RENT ON TIME?: NO

## 2024-09-24 SDOH — ECONOMIC STABILITY: FOOD INSECURITY: WITHIN THE PAST 12 MONTHS, THE FOOD YOU BOUGHT JUST DIDN'T LAST AND YOU DIDN'T HAVE MONEY TO GET MORE.: NEVER TRUE

## 2024-09-24 SDOH — HEALTH STABILITY: PHYSICAL HEALTH: ON AVERAGE, HOW MANY DAYS PER WEEK DO YOU ENGAGE IN MODERATE TO STRENUOUS EXERCISE (LIKE A BRISK WALK)?: 0 DAYS

## 2024-09-24 SDOH — HEALTH STABILITY: PHYSICAL HEALTH: ON AVERAGE, HOW MANY MINUTES DO YOU ENGAGE IN EXERCISE AT THIS LEVEL?: 0 MIN

## 2024-09-24 SDOH — ECONOMIC STABILITY: FOOD INSECURITY: WITHIN THE PAST 12 MONTHS, YOU WORRIED THAT YOUR FOOD WOULD RUN OUT BEFORE YOU GOT MONEY TO BUY MORE.: NEVER TRUE

## 2024-09-24 SDOH — ECONOMIC STABILITY: TRANSPORTATION INSECURITY
IN THE PAST 12 MONTHS, HAS THE LACK OF TRANSPORTATION KEPT YOU FROM MEDICAL APPOINTMENTS OR FROM GETTING MEDICATIONS?: NO

## 2024-09-24 ASSESSMENT — PATIENT HEALTH QUESTIONNAIRE - PHQ9
1. LITTLE INTEREST OR PLEASURE IN DOING THINGS: NOT AT ALL
SUM OF ALL RESPONSES TO PHQ QUESTIONS 1-9: 0
SUM OF ALL RESPONSES TO PHQ9 QUESTIONS 1 & 2: 0
SUM OF ALL RESPONSES TO PHQ QUESTIONS 1-9: 0
2. FEELING DOWN, DEPRESSED OR HOPELESS: NOT AT ALL

## 2024-09-24 ASSESSMENT — SOCIAL DETERMINANTS OF HEALTH (SDOH)
WITHIN THE LAST YEAR, HAVE TO BEEN RAPED OR FORCED TO HAVE ANY KIND OF SEXUAL ACTIVITY BY YOUR PARTNER OR EX-PARTNER?: NO
DO YOU BELONG TO ANY CLUBS OR ORGANIZATIONS SUCH AS CHURCH GROUPS UNIONS, FRATERNAL OR ATHLETIC GROUPS, OR SCHOOL GROUPS?: NO
HOW OFTEN DO YOU ATTENT MEETINGS OF THE CLUB OR ORGANIZATION YOU BELONG TO?: NEVER
WITHIN THE LAST YEAR, HAVE YOU BEEN HUMILIATED OR EMOTIONALLY ABUSED IN OTHER WAYS BY YOUR PARTNER OR EX-PARTNER?: NO
WITHIN THE LAST YEAR, HAVE YOU BEEN KICKED, HIT, SLAPPED, OR OTHERWISE PHYSICALLY HURT BY YOUR PARTNER OR EX-PARTNER?: NO
IN A TYPICAL WEEK, HOW MANY TIMES DO YOU TALK ON THE PHONE WITH FAMILY, FRIENDS, OR NEIGHBORS?: MORE THAN THREE TIMES A WEEK
HOW OFTEN DO YOU GET TOGETHER WITH FRIENDS OR RELATIVES?: MORE THAN THREE TIMES A WEEK
WITHIN THE LAST YEAR, HAVE YOU BEEN AFRAID OF YOUR PARTNER OR EX-PARTNER?: NO
ARE YOU MARRIED, WIDOWED, DIVORCED, SEPARATED, NEVER MARRIED, OR LIVING WITH A PARTNER?: NEVER MARRIED
HOW OFTEN DO YOU ATTEND CHURCH OR RELIGIOUS SERVICES?: NEVER

## 2024-09-26 ENCOUNTER — OFFICE VISIT (OUTPATIENT)
Age: 54
End: 2024-09-26
Payer: COMMERCIAL

## 2024-09-26 VITALS
HEIGHT: 64 IN | SYSTOLIC BLOOD PRESSURE: 110 MMHG | DIASTOLIC BLOOD PRESSURE: 80 MMHG | TEMPERATURE: 97.5 F | RESPIRATION RATE: 18 BRPM | OXYGEN SATURATION: 100 % | BODY MASS INDEX: 32.27 KG/M2 | WEIGHT: 189 LBS | HEART RATE: 58 BPM

## 2024-09-26 DIAGNOSIS — G43.109 MIGRAINE WITH AURA AND WITHOUT STATUS MIGRAINOSUS, NOT INTRACTABLE: Primary | ICD-10-CM

## 2024-09-26 PROCEDURE — 3074F SYST BP LT 130 MM HG: CPT | Performed by: NURSE PRACTITIONER

## 2024-09-26 PROCEDURE — 96372 THER/PROPH/DIAG INJ SC/IM: CPT | Performed by: NURSE PRACTITIONER

## 2024-09-26 PROCEDURE — 99213 OFFICE O/P EST LOW 20 MIN: CPT | Performed by: NURSE PRACTITIONER

## 2024-09-26 PROCEDURE — 3079F DIAST BP 80-89 MM HG: CPT | Performed by: NURSE PRACTITIONER

## 2024-09-26 RX ORDER — KETOROLAC TROMETHAMINE 30 MG/ML
30 INJECTION, SOLUTION INTRAMUSCULAR; INTRAVENOUS ONCE
Status: COMPLETED | OUTPATIENT
Start: 2024-09-26 | End: 2024-09-26

## 2024-09-26 RX ORDER — DEXAMETHASONE SODIUM PHOSPHATE 4 MG/ML
4 INJECTION, SOLUTION INTRA-ARTICULAR; INTRALESIONAL; INTRAMUSCULAR; INTRAVENOUS; SOFT TISSUE ONCE
Status: COMPLETED | OUTPATIENT
Start: 2024-09-26 | End: 2024-09-26

## 2024-09-26 RX ADMIN — KETOROLAC TROMETHAMINE 30 MG: 30 INJECTION, SOLUTION INTRAMUSCULAR; INTRAVENOUS at 10:55

## 2024-09-26 RX ADMIN — DEXAMETHASONE SODIUM PHOSPHATE 4 MG: 4 INJECTION, SOLUTION INTRA-ARTICULAR; INTRALESIONAL; INTRAMUSCULAR; INTRAVENOUS; SOFT TISSUE at 10:52

## 2024-09-26 ASSESSMENT — PATIENT HEALTH QUESTIONNAIRE - PHQ9
2. FEELING DOWN, DEPRESSED OR HOPELESS: NOT AT ALL
SUM OF ALL RESPONSES TO PHQ QUESTIONS 1-9: 0
SUM OF ALL RESPONSES TO PHQ QUESTIONS 1-9: 0
SUM OF ALL RESPONSES TO PHQ9 QUESTIONS 1 & 2: 0
SUM OF ALL RESPONSES TO PHQ QUESTIONS 1-9: 0
1. LITTLE INTEREST OR PLEASURE IN DOING THINGS: NOT AT ALL
SUM OF ALL RESPONSES TO PHQ QUESTIONS 1-9: 0

## 2024-10-09 RX ORDER — CETIRIZINE HYDROCHLORIDE 10 MG/1
TABLET, FILM COATED ORAL
Qty: 90 TABLET | Refills: 0 | Status: SHIPPED | OUTPATIENT
Start: 2024-10-09

## 2024-10-09 NOTE — TELEPHONE ENCOUNTER
Patient requesting refill on     Requested Prescriptions     Pending Prescriptions Disp Refills    EQ ALLERGY RELIEF, CETIRIZINE, 10 MG tablet [Pharmacy Med Name: EQ Allergy Relief (Cetirizine) 10 MG Oral Tablet] 90 tablet 0     Sig: TAKE 1 TABLET BY MOUTH AT BEDTIME AS NEEDED FOR ALLERGIES        Last OV 09/26/2024

## 2024-10-15 LAB
ESTIMATED AVERAGE GLUCOSE: NORMAL
HBA1C MFR BLD: 5.5 %

## 2024-10-15 RX ORDER — PRAVASTATIN SODIUM 10 MG
TABLET ORAL
Qty: 90 TABLET | Refills: 0 | Status: SHIPPED | OUTPATIENT
Start: 2024-10-15

## 2024-10-15 RX ORDER — SPIRONOLACTONE 25 MG/1
25 TABLET ORAL DAILY
Qty: 90 TABLET | Refills: 0 | Status: SHIPPED | OUTPATIENT
Start: 2024-10-15

## 2024-11-20 RX ORDER — LEVOTHYROXINE SODIUM 75 UG/1
75 TABLET ORAL DAILY
Qty: 90 TABLET | Refills: 1
Start: 2024-11-20

## 2024-11-21 RX ORDER — TOPIRAMATE 50 MG/1
50 TABLET, FILM COATED ORAL NIGHTLY
Qty: 90 TABLET | Refills: 1 | Status: SHIPPED | OUTPATIENT
Start: 2024-11-21

## 2024-11-26 ENCOUNTER — OFFICE VISIT (OUTPATIENT)
Age: 54
End: 2024-11-26
Payer: COMMERCIAL

## 2024-11-26 VITALS
SYSTOLIC BLOOD PRESSURE: 144 MMHG | TEMPERATURE: 98.6 F | RESPIRATION RATE: 18 BRPM | DIASTOLIC BLOOD PRESSURE: 80 MMHG | OXYGEN SATURATION: 97 % | HEART RATE: 70 BPM | WEIGHT: 196.2 LBS | BODY MASS INDEX: 33.68 KG/M2

## 2024-11-26 DIAGNOSIS — J06.9 VIRAL URI: Primary | ICD-10-CM

## 2024-11-26 PROCEDURE — 99213 OFFICE O/P EST LOW 20 MIN: CPT

## 2024-11-26 PROCEDURE — 3077F SYST BP >= 140 MM HG: CPT

## 2024-11-26 PROCEDURE — 3079F DIAST BP 80-89 MM HG: CPT

## 2024-11-26 RX ORDER — AZELASTINE 1 MG/ML
2 SPRAY, METERED NASAL 2 TIMES DAILY
Qty: 120 ML | Refills: 1 | Status: SHIPPED | OUTPATIENT
Start: 2024-11-26

## 2024-11-26 ASSESSMENT — ENCOUNTER SYMPTOMS
SINUS PRESSURE: 1
RHINORRHEA: 0
COUGH: 0
SHORTNESS OF BREATH: 0
WHEEZING: 0

## 2024-11-26 NOTE — PROGRESS NOTES
\"Have you been to the ER, urgent care clinic since your last visit?  Hospitalized since your last visit?\"    NO    “Have you seen or consulted any other health care providers outside our system since your last visit?”    NO      “Have you had a diabetic eye exam?”    NO     No diabetic eye exam on file     Chief Complaint   Patient presents with    Sinus Problem     Headache/congestion/pressure, nasal congestion,  bilateral \"ear popping\" x 5 days        Vitals:    11/26/24 1503   BP: (!) 144/80   Pulse: 70   Resp: 18   Temp: 98.6 °F (37 °C)   SpO2: 97%          
Mouth/Throat:      Mouth: Mucous membranes are moist.      Pharynx: Oropharynx is clear. No oropharyngeal exudate or posterior oropharyngeal erythema.   Eyes:      Extraocular Movements: Extraocular movements intact.      Conjunctiva/sclera: Conjunctivae normal.      Pupils: Pupils are equal, round, and reactive to light.   Cardiovascular:      Rate and Rhythm: Normal rate and regular rhythm.      Pulses: Normal pulses.      Heart sounds: Normal heart sounds. No murmur heard.     No friction rub. No gallop.   Pulmonary:      Effort: Pulmonary effort is normal. No respiratory distress.      Breath sounds: Normal breath sounds. No wheezing, rhonchi or rales.   Musculoskeletal:      Cervical back: Normal range of motion and neck supple.   Lymphadenopathy:      Cervical: No cervical adenopathy.   Skin:     General: Skin is warm and dry.   Neurological:      General: No focal deficit present.      Mental Status: She is alert and oriented to person, place, and time.   Psychiatric:         Mood and Affect: Mood normal.         Behavior: Behavior normal.         Thought Content: Thought content normal.         Judgment: Judgment normal.       Assessment & Plan  Viral URI  Declines viral testing today.  URI vs allergies.  Continue symptomatic management of symptoms; APAP per package instructions for discomfort, antitussives for cough, push fluids and rest.  Orders:    azelastine (ASTELIN) 0.1 % nasal spray; 2 sprays by Nasal route 2 times daily Use in each nostril as directed        Medication Side Effects and Warnings were discussed with patient: Yes  Patient Labs were reviewed:  Yes  Patient Past Records were reviewed:  Yes    Patient aware of plan of care and verbalized understanding. Questions answered. RTC PRN or sooner if needed.    On this date 11/26/2024 I have spent 20 minutes reviewing previous notes, test results and face to face with the patient discussing the diagnosis and importance of compliance with the

## 2024-12-19 ENCOUNTER — HOSPITAL ENCOUNTER (OUTPATIENT)
Facility: HOSPITAL | Age: 54
Discharge: HOME | End: 2024-12-22
Payer: COMMERCIAL

## 2024-12-19 DIAGNOSIS — G43.101 MIGRAINE WITH AURA AND WITH STATUS MIGRAINOSUS, NOT INTRACTABLE: ICD-10-CM

## 2024-12-19 PROCEDURE — 70551 MRI BRAIN STEM W/O DYE: CPT

## 2024-12-26 RX ORDER — BENAZEPRIL HYDROCHLORIDE 40 MG/1
40 TABLET ORAL DAILY
Qty: 90 TABLET | Refills: 0 | Status: SHIPPED | OUTPATIENT
Start: 2024-12-26

## 2024-12-26 RX ORDER — AMLODIPINE BESYLATE 10 MG/1
TABLET ORAL
Qty: 90 TABLET | Refills: 0 | Status: SHIPPED | OUTPATIENT
Start: 2024-12-26

## 2025-01-08 RX ORDER — PRAVASTATIN SODIUM 10 MG
TABLET ORAL
Qty: 90 TABLET | Refills: 0 | Status: SHIPPED | OUTPATIENT
Start: 2025-01-08

## 2025-01-08 RX ORDER — SPIRONOLACTONE 25 MG/1
25 TABLET ORAL DAILY
Qty: 90 TABLET | Refills: 0 | Status: SHIPPED | OUTPATIENT
Start: 2025-01-08

## 2025-01-08 RX ORDER — CETIRIZINE HYDROCHLORIDE 10 MG/1
TABLET, FILM COATED ORAL
Qty: 90 TABLET | Refills: 0 | Status: SHIPPED | OUTPATIENT
Start: 2025-01-08

## 2025-01-19 SDOH — ECONOMIC STABILITY: FOOD INSECURITY: WITHIN THE PAST 12 MONTHS, YOU WORRIED THAT YOUR FOOD WOULD RUN OUT BEFORE YOU GOT MONEY TO BUY MORE.: NEVER TRUE

## 2025-01-19 SDOH — ECONOMIC STABILITY: FOOD INSECURITY: WITHIN THE PAST 12 MONTHS, THE FOOD YOU BOUGHT JUST DIDN'T LAST AND YOU DIDN'T HAVE MONEY TO GET MORE.: NEVER TRUE

## 2025-01-19 SDOH — ECONOMIC STABILITY: INCOME INSECURITY: IN THE LAST 12 MONTHS, WAS THERE A TIME WHEN YOU WERE NOT ABLE TO PAY THE MORTGAGE OR RENT ON TIME?: NO

## 2025-01-27 ENCOUNTER — OFFICE VISIT (OUTPATIENT)
Age: 55
End: 2025-01-27
Payer: COMMERCIAL

## 2025-01-27 VITALS
WEIGHT: 192 LBS | HEIGHT: 64 IN | TEMPERATURE: 97.9 F | OXYGEN SATURATION: 97 % | HEART RATE: 69 BPM | DIASTOLIC BLOOD PRESSURE: 68 MMHG | SYSTOLIC BLOOD PRESSURE: 110 MMHG | BODY MASS INDEX: 32.78 KG/M2

## 2025-01-27 DIAGNOSIS — L29.9 ITCHY SKIN: ICD-10-CM

## 2025-01-27 DIAGNOSIS — M79.89 INTERMITTENT PAIN AND SWELLING OF HAND: Primary | ICD-10-CM

## 2025-01-27 DIAGNOSIS — I10 PRIMARY HYPERTENSION: ICD-10-CM

## 2025-01-27 DIAGNOSIS — I25.10 CHRONIC CORONARY ARTERY DISEASE: ICD-10-CM

## 2025-01-27 DIAGNOSIS — M79.643 INTERMITTENT PAIN AND SWELLING OF HAND: Primary | ICD-10-CM

## 2025-01-27 DIAGNOSIS — E78.00 PURE HYPERCHOLESTEROLEMIA: ICD-10-CM

## 2025-01-27 DIAGNOSIS — M1A.0410 CHRONIC GOUT OF RIGHT HAND, UNSPECIFIED CAUSE: ICD-10-CM

## 2025-01-27 DIAGNOSIS — R73.03 PREDIABETES: ICD-10-CM

## 2025-01-27 PROCEDURE — 3078F DIAST BP <80 MM HG: CPT | Performed by: NURSE PRACTITIONER

## 2025-01-27 PROCEDURE — 99214 OFFICE O/P EST MOD 30 MIN: CPT | Performed by: NURSE PRACTITIONER

## 2025-01-27 PROCEDURE — 3074F SYST BP LT 130 MM HG: CPT | Performed by: NURSE PRACTITIONER

## 2025-01-27 RX ORDER — COLCHICINE 0.6 MG/1
TABLET ORAL
Qty: 10 TABLET | Refills: 1 | Status: SHIPPED | OUTPATIENT
Start: 2025-01-27

## 2025-01-27 RX ORDER — BUTALBITAL, ACETAMINOPHEN AND CAFFEINE 50; 325; 40 MG/1; MG/1; MG/1
1 TABLET ORAL EVERY 4 HOURS PRN
COMMUNITY

## 2025-01-27 RX ORDER — NICOTINE 21 MG/24HR
1 PATCH, TRANSDERMAL 24 HOURS TRANSDERMAL DAILY
Qty: 21 PATCH | Refills: 0 | Status: SHIPPED | OUTPATIENT
Start: 2025-01-27 | End: 2025-02-17

## 2025-01-27 SDOH — ECONOMIC STABILITY: INCOME INSECURITY: IN THE LAST 12 MONTHS, WAS THERE A TIME WHEN YOU WERE NOT ABLE TO PAY THE MORTGAGE OR RENT ON TIME?: NO

## 2025-01-27 SDOH — ECONOMIC STABILITY: FOOD INSECURITY: WITHIN THE PAST 12 MONTHS, THE FOOD YOU BOUGHT JUST DIDN'T LAST AND YOU DIDN'T HAVE MONEY TO GET MORE.: NEVER TRUE

## 2025-01-27 SDOH — ECONOMIC STABILITY: FOOD INSECURITY: WITHIN THE PAST 12 MONTHS, YOU WORRIED THAT YOUR FOOD WOULD RUN OUT BEFORE YOU GOT MONEY TO BUY MORE.: NEVER TRUE

## 2025-01-27 ASSESSMENT — PATIENT HEALTH QUESTIONNAIRE - PHQ9
SUM OF ALL RESPONSES TO PHQ QUESTIONS 1-9: 0
SUM OF ALL RESPONSES TO PHQ QUESTIONS 1-9: 0
2. FEELING DOWN, DEPRESSED OR HOPELESS: NOT AT ALL
SUM OF ALL RESPONSES TO PHQ QUESTIONS 1-9: 0
1. LITTLE INTEREST OR PLEASURE IN DOING THINGS: NOT AT ALL
SUM OF ALL RESPONSES TO PHQ9 QUESTIONS 1 & 2: 0
SUM OF ALL RESPONSES TO PHQ QUESTIONS 1-9: 0

## 2025-01-28 LAB
ALBUMIN SERPL-MCNC: 3.8 G/DL (ref 3.5–5)
ALBUMIN/GLOB SERPL: 1.3 (ref 1.1–2.2)
ALP SERPL-CCNC: 93 U/L (ref 45–117)
ALT SERPL-CCNC: 21 U/L (ref 12–78)
ANION GAP SERPL CALC-SCNC: 6 MMOL/L (ref 2–12)
AST SERPL-CCNC: 16 U/L (ref 15–37)
BASOPHILS # BLD: 0.06 K/UL (ref 0–0.1)
BASOPHILS NFR BLD: 0.9 % (ref 0–1)
BILIRUB SERPL-MCNC: 0.2 MG/DL (ref 0.2–1)
BUN SERPL-MCNC: 21 MG/DL (ref 6–20)
BUN/CREAT SERPL: 27 (ref 12–20)
CALCIUM SERPL-MCNC: 9.3 MG/DL (ref 8.5–10.1)
CHLORIDE SERPL-SCNC: 116 MMOL/L (ref 97–108)
CHOLEST SERPL-MCNC: 208 MG/DL
CO2 SERPL-SCNC: 21 MMOL/L (ref 21–32)
CREAT SERPL-MCNC: 0.79 MG/DL (ref 0.55–1.02)
CREAT UR-MCNC: 147 MG/DL
DIFFERENTIAL METHOD BLD: ABNORMAL
EOSINOPHIL # BLD: 0.19 K/UL (ref 0–0.4)
EOSINOPHIL NFR BLD: 2.8 % (ref 0–7)
ERYTHROCYTE [DISTWIDTH] IN BLOOD BY AUTOMATED COUNT: 14.6 % (ref 11.5–14.5)
GLOBULIN SER CALC-MCNC: 3 G/DL (ref 2–4)
GLUCOSE SERPL-MCNC: 95 MG/DL (ref 65–100)
HCT VFR BLD AUTO: 40.1 % (ref 35–47)
HDLC SERPL-MCNC: 69 MG/DL
HDLC SERPL: 3 (ref 0–5)
HGB BLD-MCNC: 13.2 G/DL (ref 11.5–16)
IMM GRANULOCYTES # BLD AUTO: 0.02 K/UL (ref 0–0.04)
IMM GRANULOCYTES NFR BLD AUTO: 0.3 % (ref 0–0.5)
LDLC SERPL CALC-MCNC: 121.4 MG/DL (ref 0–100)
LYMPHOCYTES # BLD: 2.44 K/UL (ref 0.8–3.5)
LYMPHOCYTES NFR BLD: 36 % (ref 12–49)
MCH RBC QN AUTO: 32.3 PG (ref 26–34)
MCHC RBC AUTO-ENTMCNC: 32.9 G/DL (ref 30–36.5)
MCV RBC AUTO: 98 FL (ref 80–99)
MICROALBUMIN UR-MCNC: 4.95 MG/DL
MICROALBUMIN/CREAT UR-RTO: 34 MG/G (ref 0–30)
MONOCYTES # BLD: 0.46 K/UL (ref 0–1)
MONOCYTES NFR BLD: 6.8 % (ref 5–13)
NEUTS SEG # BLD: 3.6 K/UL (ref 1.8–8)
NEUTS SEG NFR BLD: 53.2 % (ref 32–75)
NRBC # BLD: 0 K/UL (ref 0–0.01)
NRBC BLD-RTO: 0 PER 100 WBC
PLATELET # BLD AUTO: 311 K/UL (ref 150–400)
PMV BLD AUTO: 10.6 FL (ref 8.9–12.9)
POTASSIUM SERPL-SCNC: 3.3 MMOL/L (ref 3.5–5.1)
PROT SERPL-MCNC: 6.8 G/DL (ref 6.4–8.2)
RBC # BLD AUTO: 4.09 M/UL (ref 3.8–5.2)
SODIUM SERPL-SCNC: 143 MMOL/L (ref 136–145)
TRIGL SERPL-MCNC: 88 MG/DL
URATE SERPL-MCNC: 1 MG/DL (ref 2.6–6)
VLDLC SERPL CALC-MCNC: 17.6 MG/DL
WBC # BLD AUTO: 6.8 K/UL (ref 3.6–11)

## 2025-01-28 NOTE — PROGRESS NOTES
Patient here for labs only drawn from right  antecubital without pain or bruising.   
Subjective:       Reyna Benavides is a 54 y.o. female who presents with possible gout. Pain is located in  right hand and wrist  , and has been present  for several months since September. Pain is described as pressure and throbbing, and is intermittent,   . Associated symptoms: edema, tenderness, and warmth. The patient has tried nothing for pain relief. Related to injury: no. None Known.        Subjective:     Reyna Benavides is a 54 y.o. female who presents today with the following:  Chief Complaint   Patient presents with    Hand Pain     Right since September       Patient Active Problem List   Diagnosis    Abnormal vaginal bleeding    Biliary dyskinesia    Graves disease    Pelvic mass    Hypothyroidism    Vertigo    Severe obesity    Hypokalemia    Vitamin D deficiency    Hypertension    Migraine headache    Chronic coronary artery disease    Back pain    Benign hypertensive heart disease    Overweight    Sleep apnea         HTN; Denies chest pain, dyspnea, palpitations, headache and blurred vision. Blood pressure normotensive.    Pre Diabetes.  Sugars controlled well  Hypoglycemia: none  Tolerating current treatment  welll   Current medications include  diet control    Lab Results   Component Value Date/Time    .4 01/27/2025 04:13 PM    LDL 90.8 10/18/2023 05:00 PM     No results found for: \"MCA2\", \"MCAU2\"    Last eye exam performed  greather than 1 year ago and was normal. Records requested.    Last foot exam performed less than 1 year ago.  warm, good capillary refill      Itchy dry skin : she endorses she has had itcy dry skin since having poison ivy.  She gets some relief for ca    depression screening addressed not at risk    abuse screening addressed denies    learning assessment addressed reviewed nurses notes    fall risk addressed not at risk    HM: addressed    ROS:  Gen: denies fever, chills, fatigue, weight loss, weight gain  HEENT:denies blurry vision, nasal congestion, sore throat  Resp: 
\"Have you been to the ER, urgent care clinic since your last visit?  Hospitalized since your last visit?\"    NO    “Have you seen or consulted any other health care providers outside of [facility] since your last visit?”    NO            Click Here for Release of Records Request      Chief Complaint   Patient presents with    Hand Pain     Right since September         Vitals:    01/27/25 1523   BP: 110/68   Pulse: 69   Temp: 97.9 °F (36.6 °C)   SpO2: 97%     
tablet Take 1 tablet by mouth daily          No current facility-administered medications on file prior to visit.         Allergies         Allergies   Allergen Reactions    Hydromorphone         Other reaction(s): Unknown (comments)  Respiratory suppression leading to intubation            Review of Systems  Pertinent items are noted in HPI.        Objective:      Vitals       Vitals:     01/27/25 1523   BP: 110/68   Site: Right Upper Arm   Position: Sitting   Cuff Size: Medium Adult   Pulse: 69   Temp: 97.9 °F (36.6 °C)   TempSrc: Temporal   SpO2: 97%   Weight: 87.1 kg (192 lb)   Height: 1.626 m (5' 4\")       General: Alert and oriented. No acute distress.  Well nourished  HEENT :  Eyes: pupils equal, round, react to light and accommodation.    Nose: patent. Mouth and throat is clear.  Neck:supple full range of motion no thyromegaly. Trachea midline, No carotid bruits. No significant lymphadenopathy  Lungs:: clear to auscultation without wheezes, rales, or rhonchi.  Heart :RRR, S1 & S2 are normal intensity. No murmur; no gallop  Extremities: without clubbing or cyanosis, right hand edema with palpable tophi. Left hand trace edema. Both hands metacarpal joints are swollen.  Pulses: radial and femoral pulses are normal  Neuro: HMF intact. Cranial nerves II through XII grossly normal.     Objective           Assessment:          ICD-10-CM     1. Intermittent pain and swelling of hand  M79.643 XR HAND RIGHT (MIN 3 VIEWS)     M79.89 XR HAND LEFT (MIN 3 VIEWS)       2. Chronic gout of right hand, unspecified cause  M1A.0410 Uric Acid       Uric Acid       CANCELED: COLLECTION VENOUS BLOOD,VENIPUNCTURE       3. Itchy skin  L29.9         4. Pure hypercholesterolemia  E78.00 COLLECTION VENOUS BLOOD,VENIPUNCTURE       Lipid Panel       Comprehensive Metabolic Panel       CBC with Auto Differential       Albumin/Creatinine Ratio, Urine       Albumin/Creatinine Ratio, Urine       CBC with Auto Differential       Comprehensive

## 2025-02-03 RX ORDER — METOCLOPRAMIDE 10 MG/1
10 TABLET ORAL
Qty: 120 TABLET | Refills: 3 | Status: SHIPPED | OUTPATIENT
Start: 2025-02-03

## 2025-02-03 RX ORDER — BUTALBITAL, ACETAMINOPHEN AND CAFFEINE 50; 325; 40 MG/1; MG/1; MG/1
1 TABLET ORAL EVERY 4 HOURS PRN
Qty: 180 TABLET | Refills: 0 | Status: SHIPPED | OUTPATIENT
Start: 2025-02-03

## 2025-02-03 NOTE — TELEPHONE ENCOUNTER
Medication Refill Request    Reyna MOO Benavides is requesting a refill of the following medication(s):   butalbital-acetaminophen-caffeine (FIORICET, ESGIC) -40 MG per tablet    metoclopramide (REGLAN) 10 MG tablet   Please send refill to:     [facility] [address] [place] [address] - [address] [facility] -  [phone] - F [phone]  [address] 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

## 2025-02-26 ENCOUNTER — HOSPITAL ENCOUNTER (OUTPATIENT)
Facility: HOSPITAL | Age: 55
Discharge: HOME | End: 2025-03-01
Payer: COMMERCIAL

## 2025-02-26 DIAGNOSIS — F17.200 SMOKER: Primary | ICD-10-CM

## 2025-02-26 DIAGNOSIS — M79.89 INTERMITTENT PAIN AND SWELLING OF HAND: ICD-10-CM

## 2025-02-26 DIAGNOSIS — M79.643 INTERMITTENT PAIN AND SWELLING OF HAND: ICD-10-CM

## 2025-02-26 PROCEDURE — 73130 X-RAY EXAM OF HAND: CPT

## 2025-02-26 RX ORDER — NICOTINE 21 MG/24HR
1 PATCH, TRANSDERMAL 24 HOURS TRANSDERMAL DAILY
Qty: 42 PATCH | Refills: 0 | Status: SHIPPED | OUTPATIENT
Start: 2025-02-26 | End: 2025-04-09

## 2025-03-19 ENCOUNTER — TRANSCRIBE ORDERS (OUTPATIENT)
Facility: HOSPITAL | Age: 55
End: 2025-03-19

## 2025-03-19 ENCOUNTER — HOSPITAL ENCOUNTER (OUTPATIENT)
Facility: HOSPITAL | Age: 55
Discharge: HOME | End: 2025-03-22
Payer: COMMERCIAL

## 2025-03-19 DIAGNOSIS — M25.562 LEFT KNEE PAIN, UNSPECIFIED CHRONICITY: ICD-10-CM

## 2025-03-19 DIAGNOSIS — M25.562 LEFT KNEE PAIN, UNSPECIFIED CHRONICITY: Primary | ICD-10-CM

## 2025-03-19 PROCEDURE — 73564 X-RAY EXAM KNEE 4 OR MORE: CPT

## 2025-03-20 ENCOUNTER — TRANSCRIBE ORDERS (OUTPATIENT)
Facility: HOSPITAL | Age: 55
End: 2025-03-20

## 2025-03-20 ENCOUNTER — HOSPITAL ENCOUNTER (OUTPATIENT)
Facility: HOSPITAL | Age: 55
Discharge: HOME | End: 2025-03-23
Attending: ORTHOPAEDIC SURGERY
Payer: COMMERCIAL

## 2025-03-20 DIAGNOSIS — M25.562 PAIN, JOINT, LOWER LEG, LEFT: Primary | ICD-10-CM

## 2025-03-20 DIAGNOSIS — M25.562 PAIN, JOINT, LOWER LEG, LEFT: ICD-10-CM

## 2025-03-20 PROCEDURE — 93971 EXTREMITY STUDY: CPT

## 2025-03-23 RX ORDER — AMLODIPINE BESYLATE 10 MG/1
10 TABLET ORAL DAILY
Qty: 90 TABLET | Refills: 0 | Status: SHIPPED | OUTPATIENT
Start: 2025-03-23

## 2025-03-23 RX ORDER — BENAZEPRIL HYDROCHLORIDE 40 MG/1
40 TABLET ORAL DAILY
Qty: 90 TABLET | Refills: 0 | Status: SHIPPED | OUTPATIENT
Start: 2025-03-23

## 2025-04-08 ENCOUNTER — APPOINTMENT (OUTPATIENT)
Facility: HOSPITAL | Age: 55
End: 2025-04-08

## 2025-04-08 ENCOUNTER — HOSPITAL ENCOUNTER (EMERGENCY)
Facility: HOSPITAL | Age: 55
Discharge: HOME | End: 2025-04-08
Attending: EMERGENCY MEDICINE

## 2025-04-08 ENCOUNTER — ANESTHESIA (OUTPATIENT)
Facility: HOSPITAL | Age: 55
End: 2025-04-08

## 2025-04-08 ENCOUNTER — ANESTHESIA EVENT (OUTPATIENT)
Facility: HOSPITAL | Age: 55
End: 2025-04-08

## 2025-04-08 VITALS
WEIGHT: 170 LBS | SYSTOLIC BLOOD PRESSURE: 134 MMHG | RESPIRATION RATE: 13 BRPM | DIASTOLIC BLOOD PRESSURE: 76 MMHG | BODY MASS INDEX: 25.76 KG/M2 | HEART RATE: 61 BPM | HEIGHT: 68 IN | TEMPERATURE: 98.7 F | OXYGEN SATURATION: 99 %

## 2025-04-08 DIAGNOSIS — S82.61XA CLOSED DISPLACED FRACTURE OF LATERAL MALLEOLUS OF RIGHT FIBULA, INITIAL ENCOUNTER: Primary | ICD-10-CM

## 2025-04-08 LAB
ALBUMIN SERPL-MCNC: 3.9 G/DL (ref 3.5–5)
ALBUMIN/GLOB SERPL: 1.2 (ref 1.1–2.2)
ALP SERPL-CCNC: 101 U/L (ref 45–117)
ALT SERPL-CCNC: 22 U/L (ref 12–78)
ANION GAP SERPL CALC-SCNC: 8 MMOL/L (ref 2–12)
AST SERPL-CCNC: 13 U/L (ref 15–37)
BASOPHILS # BLD: 0.04 K/UL (ref 0–0.1)
BASOPHILS NFR BLD: 0.6 % (ref 0–1)
BILIRUB SERPL-MCNC: 0.3 MG/DL (ref 0.2–1)
BUN SERPL-MCNC: 20 MG/DL (ref 6–20)
BUN/CREAT SERPL: 27 (ref 12–20)
CALCIUM SERPL-MCNC: 9.1 MG/DL (ref 8.5–10.1)
CHLORIDE SERPL-SCNC: 108 MMOL/L (ref 97–108)
CO2 SERPL-SCNC: 27 MMOL/L (ref 21–32)
CREAT SERPL-MCNC: 0.75 MG/DL (ref 0.55–1.02)
DIFFERENTIAL METHOD BLD: ABNORMAL
EOSINOPHIL # BLD: 0.04 K/UL (ref 0–0.4)
EOSINOPHIL NFR BLD: 0.6 % (ref 0–0.7)
ERYTHROCYTE [DISTWIDTH] IN BLOOD BY AUTOMATED COUNT: 12.8 % (ref 11.5–14.5)
GLOBULIN SER CALC-MCNC: 3.2 G/DL (ref 2–4)
GLUCOSE SERPL-MCNC: 101 MG/DL (ref 65–100)
HCT VFR BLD AUTO: 40.7 % (ref 35–47)
HGB BLD-MCNC: 13.7 G/DL (ref 11.5–16)
IMM GRANULOCYTES # BLD AUTO: 0.01 K/UL (ref 0–0.04)
IMM GRANULOCYTES NFR BLD AUTO: 0.1 % (ref 0–0.5)
LYMPHOCYTES # BLD: 1.19 K/UL (ref 0.8–3.5)
LYMPHOCYTES NFR BLD: 17.4 % (ref 12–49)
MCH RBC QN AUTO: 32.1 PG (ref 26–34)
MCHC RBC AUTO-ENTMCNC: 33.7 G/DL (ref 30–36.5)
MCV RBC AUTO: 95.3 FL (ref 80–99)
MONOCYTES # BLD: 0.41 K/UL (ref 0–1)
MONOCYTES NFR BLD: 6 % (ref 5–13)
NEUTS SEG # BLD: 5.16 K/UL (ref 1.8–8)
NEUTS SEG NFR BLD: 75.3 % (ref 32–75)
NRBC # BLD: 0 K/UL (ref 0–0.01)
NRBC BLD-RTO: 0 PER 100 WBC
PLATELET # BLD AUTO: 250 K/UL (ref 150–400)
PMV BLD AUTO: 9.6 FL (ref 8.9–12.9)
POTASSIUM SERPL-SCNC: 4.1 MMOL/L (ref 3.5–5.1)
PROT SERPL-MCNC: 7.1 G/DL (ref 6.4–8.2)
RBC # BLD AUTO: 4.27 M/UL (ref 3.8–5.2)
SODIUM SERPL-SCNC: 143 MMOL/L (ref 136–145)
WBC # BLD AUTO: 6.9 K/UL (ref 3.6–11)

## 2025-04-08 PROCEDURE — 2709999900 HC NON-CHARGEABLE SUPPLY: Performed by: ORTHOPAEDIC SURGERY

## 2025-04-08 PROCEDURE — 6360000002 HC RX W HCPCS: Performed by: EMERGENCY MEDICINE

## 2025-04-08 PROCEDURE — 85025 COMPLETE CBC W/AUTO DIFF WBC: CPT

## 2025-04-08 PROCEDURE — 96374 THER/PROPH/DIAG INJ IV PUSH: CPT

## 2025-04-08 PROCEDURE — 3600000004 HC SURGERY LEVEL 4 BASE: Performed by: ORTHOPAEDIC SURGERY

## 2025-04-08 PROCEDURE — 3700000000 HC ANESTHESIA ATTENDED CARE: Performed by: ORTHOPAEDIC SURGERY

## 2025-04-08 PROCEDURE — 7100000010 HC PHASE II RECOVERY - FIRST 15 MIN: Performed by: ORTHOPAEDIC SURGERY

## 2025-04-08 PROCEDURE — 73590 X-RAY EXAM OF LOWER LEG: CPT

## 2025-04-08 PROCEDURE — 73610 X-RAY EXAM OF ANKLE: CPT

## 2025-04-08 PROCEDURE — 64445 NJX AA&/STRD SCIATIC NRV IMG: CPT | Performed by: ANESTHESIOLOGY

## 2025-04-08 PROCEDURE — 3600000014 HC SURGERY LEVEL 4 ADDTL 15MIN: Performed by: ORTHOPAEDIC SURGERY

## 2025-04-08 PROCEDURE — 2500000003 HC RX 250 WO HCPCS: Performed by: ORTHOPAEDIC SURGERY

## 2025-04-08 PROCEDURE — 80053 COMPREHEN METABOLIC PANEL: CPT

## 2025-04-08 PROCEDURE — 64447 NJX AA&/STRD FEMORAL NRV IMG: CPT | Performed by: ANESTHESIOLOGY

## 2025-04-08 PROCEDURE — 6360000002 HC RX W HCPCS: Performed by: ORTHOPAEDIC SURGERY

## 2025-04-08 PROCEDURE — 2580000003 HC RX 258: Performed by: ORTHOPAEDIC SURGERY

## 2025-04-08 PROCEDURE — 6360000002 HC RX W HCPCS: Performed by: ANESTHESIOLOGY

## 2025-04-08 PROCEDURE — 2720000010 HC SURG SUPPLY STERILE: Performed by: ORTHOPAEDIC SURGERY

## 2025-04-08 PROCEDURE — 27788 TREATMENT OF ANKLE FRACTURE: CPT

## 2025-04-08 PROCEDURE — 7100000011 HC PHASE II RECOVERY - ADDTL 15 MIN: Performed by: ORTHOPAEDIC SURGERY

## 2025-04-08 PROCEDURE — 36415 COLL VENOUS BLD VENIPUNCTURE: CPT

## 2025-04-08 PROCEDURE — 96376 TX/PRO/DX INJ SAME DRUG ADON: CPT

## 2025-04-08 PROCEDURE — 3700000001 HC ADD 15 MINUTES (ANESTHESIA): Performed by: ORTHOPAEDIC SURGERY

## 2025-04-08 PROCEDURE — 99285 EMERGENCY DEPT VISIT HI MDM: CPT

## 2025-04-08 PROCEDURE — 2580000003 HC RX 258: Performed by: ANESTHESIOLOGY

## 2025-04-08 PROCEDURE — C1713 ANCHOR/SCREW BN/BN,TIS/BN: HCPCS | Performed by: ORTHOPAEDIC SURGERY

## 2025-04-08 DEVICE — SCREW BNE L18MM DIA3.5MM CORT S STL ST LOK FULL THRD T15: Type: IMPLANTABLE DEVICE | Site: ANKLE | Status: FUNCTIONAL

## 2025-04-08 DEVICE — SCREW BNE L24MM DIA3.5MM CORT S STL ST FULL THRD LOK T15: Type: IMPLANTABLE DEVICE | Site: ANKLE | Status: FUNCTIONAL

## 2025-04-08 DEVICE — SCREW BNE L16MM DIA3.5MM CORT S STL ST LOK FULL THRD: Type: IMPLANTABLE DEVICE | Site: ANKLE | Status: FUNCTIONAL

## 2025-04-08 DEVICE — PLATE BNE L69MM 6 H S STL 1/3 TBLR LOK COMPR W/ CLLR FOR: Type: IMPLANTABLE DEVICE | Site: ANKLE | Status: FUNCTIONAL

## 2025-04-08 DEVICE — SCREW BNE L14MM DIA3.5MM CORT S STL ST LOK FULL THRD: Type: IMPLANTABLE DEVICE | Site: ANKLE | Status: FUNCTIONAL

## 2025-04-08 DEVICE — SCREW BNE L18MM DIA3.5MM CORT S STL ST LOK FULL THRD: Type: IMPLANTABLE DEVICE | Site: ANKLE | Status: FUNCTIONAL

## 2025-04-08 DEVICE — SCREW BNE L28MM DIA3.5MM CORT S STL ST FULL THRD LOK T15: Type: IMPLANTABLE DEVICE | Site: ANKLE | Status: FUNCTIONAL

## 2025-04-08 RX ORDER — FENTANYL CITRATE 50 UG/ML
50 INJECTION, SOLUTION INTRAMUSCULAR; INTRAVENOUS
Refills: 0 | Status: COMPLETED | OUTPATIENT
Start: 2025-04-08 | End: 2025-04-08

## 2025-04-08 RX ORDER — METOCLOPRAMIDE HYDROCHLORIDE 5 MG/ML
10 INJECTION INTRAMUSCULAR; INTRAVENOUS
Status: DISCONTINUED | OUTPATIENT
Start: 2025-04-08 | End: 2025-04-08 | Stop reason: HOSPADM

## 2025-04-08 RX ORDER — IBUPROFEN 800 MG/1
800 TABLET, FILM COATED ORAL 4 TIMES DAILY PRN
Qty: 120 TABLET | Refills: 0 | Status: SHIPPED | OUTPATIENT
Start: 2025-04-08

## 2025-04-08 RX ORDER — 0.9 % SODIUM CHLORIDE 0.9 %
INTRAVENOUS SOLUTION INTRAVENOUS
Status: DISCONTINUED | OUTPATIENT
Start: 2025-04-08 | End: 2025-04-08 | Stop reason: SDUPTHER

## 2025-04-08 RX ORDER — FENTANYL CITRATE 0.05 MG/ML
50 INJECTION, SOLUTION INTRAMUSCULAR; INTRAVENOUS ONCE
Refills: 0 | Status: COMPLETED | OUTPATIENT
Start: 2025-04-08 | End: 2025-04-08

## 2025-04-08 RX ORDER — LABETALOL HYDROCHLORIDE 5 MG/ML
10 INJECTION, SOLUTION INTRAVENOUS
Status: DISCONTINUED | OUTPATIENT
Start: 2025-04-08 | End: 2025-04-08 | Stop reason: HOSPADM

## 2025-04-08 RX ORDER — FENTANYL CITRATE 50 UG/ML
50 INJECTION, SOLUTION INTRAMUSCULAR; INTRAVENOUS EVERY 5 MIN PRN
Status: DISCONTINUED | OUTPATIENT
Start: 2025-04-08 | End: 2025-04-08 | Stop reason: HOSPADM

## 2025-04-08 RX ORDER — SODIUM CHLORIDE 0.9 % (FLUSH) 0.9 %
5-40 SYRINGE (ML) INJECTION EVERY 12 HOURS SCHEDULED
Status: DISCONTINUED | OUTPATIENT
Start: 2025-04-08 | End: 2025-04-08 | Stop reason: HOSPADM

## 2025-04-08 RX ORDER — ROPIVACAINE HYDROCHLORIDE 5 MG/ML
INJECTION, SOLUTION EPIDURAL; INFILTRATION; PERINEURAL
Status: COMPLETED | OUTPATIENT
Start: 2025-04-08 | End: 2025-04-08

## 2025-04-08 RX ORDER — LIDOCAINE HYDROCHLORIDE 20 MG/ML
INJECTION, SOLUTION EPIDURAL; INFILTRATION; INTRACAUDAL; PERINEURAL
Status: DISCONTINUED | OUTPATIENT
Start: 2025-04-08 | End: 2025-04-08 | Stop reason: SDUPTHER

## 2025-04-08 RX ORDER — DIPHENHYDRAMINE HYDROCHLORIDE 50 MG/ML
12.5 INJECTION, SOLUTION INTRAMUSCULAR; INTRAVENOUS
Status: DISCONTINUED | OUTPATIENT
Start: 2025-04-08 | End: 2025-04-08 | Stop reason: HOSPADM

## 2025-04-08 RX ORDER — SODIUM CHLORIDE, SODIUM LACTATE, POTASSIUM CHLORIDE, CALCIUM CHLORIDE 600; 310; 30; 20 MG/100ML; MG/100ML; MG/100ML; MG/100ML
INJECTION, SOLUTION INTRAVENOUS CONTINUOUS
Status: DISCONTINUED | OUTPATIENT
Start: 2025-04-08 | End: 2025-04-08 | Stop reason: HOSPADM

## 2025-04-08 RX ORDER — FENTANYL CITRATE 50 UG/ML
INJECTION, SOLUTION INTRAMUSCULAR; INTRAVENOUS
Status: COMPLETED | OUTPATIENT
Start: 2025-04-08 | End: 2025-04-08

## 2025-04-08 RX ORDER — DEXAMETHASONE SODIUM PHOSPHATE 4 MG/ML
4 INJECTION, SOLUTION INTRA-ARTICULAR; INTRALESIONAL; INTRAMUSCULAR; INTRAVENOUS; SOFT TISSUE
Status: DISCONTINUED | OUTPATIENT
Start: 2025-04-08 | End: 2025-04-08 | Stop reason: HOSPADM

## 2025-04-08 RX ORDER — MIDAZOLAM HYDROCHLORIDE 1 MG/ML
INJECTION, SOLUTION INTRAMUSCULAR; INTRAVENOUS
Status: COMPLETED | OUTPATIENT
Start: 2025-04-08 | End: 2025-04-08

## 2025-04-08 RX ORDER — HYDRALAZINE HYDROCHLORIDE 20 MG/ML
10 INJECTION INTRAMUSCULAR; INTRAVENOUS
Status: DISCONTINUED | OUTPATIENT
Start: 2025-04-08 | End: 2025-04-08 | Stop reason: HOSPADM

## 2025-04-08 RX ORDER — SODIUM CHLORIDE 9 MG/ML
INJECTION, SOLUTION INTRAVENOUS PRN
Status: DISCONTINUED | OUTPATIENT
Start: 2025-04-08 | End: 2025-04-08 | Stop reason: HOSPADM

## 2025-04-08 RX ORDER — OXYCODONE AND ACETAMINOPHEN 5; 325 MG/1; MG/1
1 TABLET ORAL EVERY 6 HOURS PRN
Qty: 20 TABLET | Refills: 0 | Status: SHIPPED | OUTPATIENT
Start: 2025-04-08 | End: 2025-04-13

## 2025-04-08 RX ORDER — SODIUM CHLORIDE 0.9 % (FLUSH) 0.9 %
5-40 SYRINGE (ML) INJECTION PRN
Status: DISCONTINUED | OUTPATIENT
Start: 2025-04-08 | End: 2025-04-08 | Stop reason: HOSPADM

## 2025-04-08 RX ORDER — NALOXONE HYDROCHLORIDE 0.4 MG/ML
INJECTION, SOLUTION INTRAMUSCULAR; INTRAVENOUS; SUBCUTANEOUS PRN
Status: DISCONTINUED | OUTPATIENT
Start: 2025-04-08 | End: 2025-04-08 | Stop reason: HOSPADM

## 2025-04-08 RX ORDER — KETOROLAC TROMETHAMINE 30 MG/ML
INJECTION, SOLUTION INTRAMUSCULAR; INTRAVENOUS
Status: DISCONTINUED | OUTPATIENT
Start: 2025-04-08 | End: 2025-04-08 | Stop reason: SDUPTHER

## 2025-04-08 RX ADMIN — SODIUM CHLORIDE 500 ML: 9 INJECTION, SOLUTION INTRAVENOUS at 12:32

## 2025-04-08 RX ADMIN — MIDAZOLAM HYDROCHLORIDE 4 MG: 1 INJECTION, SOLUTION INTRAMUSCULAR; INTRAVENOUS at 11:30

## 2025-04-08 RX ADMIN — ROPIVACAINE HYDROCHLORIDE 25 ML: 5 INJECTION EPIDURAL; INFILTRATION; PERINEURAL at 11:35

## 2025-04-08 RX ADMIN — SODIUM CHLORIDE, POTASSIUM CHLORIDE, SODIUM LACTATE AND CALCIUM CHLORIDE: 600; 310; 30; 20 INJECTION, SOLUTION INTRAVENOUS at 12:32

## 2025-04-08 RX ADMIN — SODIUM CHLORIDE 500 ML: 9 INJECTION, SOLUTION INTRAVENOUS at 11:29

## 2025-04-08 RX ADMIN — FENTANYL CITRATE 50 MCG: 50 INJECTION INTRAMUSCULAR; INTRAVENOUS at 06:50

## 2025-04-08 RX ADMIN — KETOROLAC TROMETHAMINE 30 MG: 30 INJECTION, SOLUTION INTRAMUSCULAR at 12:49

## 2025-04-08 RX ADMIN — PROPOFOL 70 MG: 10 INJECTION, EMULSION INTRAVENOUS at 08:52

## 2025-04-08 RX ADMIN — WATER 2000 MG: 1 INJECTION INTRAMUSCULAR; INTRAVENOUS; SUBCUTANEOUS at 12:15

## 2025-04-08 RX ADMIN — FENTANYL CITRATE 50 MCG: 0.05 INJECTION, SOLUTION INTRAMUSCULAR; INTRAVENOUS at 08:27

## 2025-04-08 RX ADMIN — FENTANYL CITRATE 100 MCG: 50 INJECTION, SOLUTION INTRAMUSCULAR; INTRAVENOUS at 11:30

## 2025-04-08 RX ADMIN — ROPIVACAINE HYDROCHLORIDE 20 ML: 5 INJECTION, SOLUTION EPIDURAL; INFILTRATION; PERINEURAL at 11:39

## 2025-04-08 RX ADMIN — LIDOCAINE HYDROCHLORIDE 50 MG: 20 INJECTION, SOLUTION EPIDURAL; INFILTRATION; INTRACAUDAL; PERINEURAL at 11:53

## 2025-04-08 ASSESSMENT — LIFESTYLE VARIABLES
SMOKING_STATUS: 1
HOW MANY STANDARD DRINKS CONTAINING ALCOHOL DO YOU HAVE ON A TYPICAL DAY: PATIENT DOES NOT DRINK
HOW OFTEN DO YOU HAVE A DRINK CONTAINING ALCOHOL: NEVER

## 2025-04-08 ASSESSMENT — PAIN SCALES - GENERAL
PAINLEVEL_OUTOF10: 9
PAINLEVEL_OUTOF10: 9

## 2025-04-08 ASSESSMENT — PAIN - FUNCTIONAL ASSESSMENT: PAIN_FUNCTIONAL_ASSESSMENT: 0-10

## 2025-04-08 NOTE — BRIEF OP NOTE
Brief Postoperative Note      Patient: Reyna Benavides  YOB: 1970  MRN: [id number]    Date of Procedure: 4/8/2025    Pre-Op Diagnosis Codes:      * Closed displaced fracture of lateral malleolus of right fibula, initial encounter [S82.61XA]    Post-Op Diagnosis: Same       Procedure(s):  OPEN REDUCTION INTERNAL FIXATION RIGHT ANKLE    Surgeon(s):  Sarath Álvarez MD    Assistant:  * No surgical staff found *    Anesthesia: General    Estimated Blood Loss (mL): less than 50     Complications: None    Specimens:   * No specimens in log *    Implants:  Implant Name Type Inv. Item Serial No.  Lot No. LRB No. Used Action   3.5 mm Cortex Screw, Self Tapping Screw/Plate/Nail/Torres   [employer]  Right 1 Implanted   3.5 mm Cortex Screw, Self Tapping Screw/Plate/Nail/Torres   [employer]  Right 1 Implanted   One-Third Tubular LCP Plate with Collar Screw/Plate/Nail/Torres   [employer]  Right 1 Implanted   3.5 mm Cortex Screw, Self Tapping Screw/Plate/Nail/Torres   [employer]  Right 1 Implanted   3.5 mm Locking Screw, Self Tapping Screw/Plate/Nail/Torres   [employer]  Right 2 Implanted   3.5 mm Locking Screw, Self Tapping Screw/Plate/Nail/Torres   [employer]  Right 1 Implanted   3.5 mm Locking Screw, Self Tapping Screw/Plate/Nail/Torres   [employer]_CR  Right 1 Implanted         Drains: * No LDAs found *    Findings:  Infection Present At Time Of Surgery (PATOS) (choose all levels that have infection present):  No infection present  Other Findings: Garcia B lateral malleolus fracture minimally comminuted with stable ankle mortise.    Electronically signed by Sarath Álvarez MD on 4/8/2025 at 1:29 PM

## 2025-04-08 NOTE — PERIOP NOTE
TRANSFER - IN REPORT:    Verbal report received from Fabio Hair RN on Reyna Benavides  being received from ED Room 8 for ordered procedure      Report consisted of patient's Situation, Background, Assessment and   Recommendations(SBAR).     Information from the following report(s) MAR and Procedure Verification was reviewed with the receiving nurse.    Opportunity for questions and clarification was provided.      Assessment completed upon patient's arrival to unit and care assumed.

## 2025-04-08 NOTE — ED TRIAGE NOTES
Pt arrived via EMS with reports of right ankle injury s/p being at work and getting hit in the ankle with a large tree. Pt is A&Ox4 with strong bilateral pedal pulses.

## 2025-04-08 NOTE — ANESTHESIA PROCEDURE NOTES
Peripheral Block    Patient location during procedure: holding area  Reason for block: post-op pain management and at surgeon's request  Start time: 4/8/2025 11:37 AM  End time: 4/8/2025 11:43 AM  Staffing  Performed: anesthesiologist   Anesthesiologist: Peter Nguyen MD  Performed by: Peter Nguyen MD  Authorized by: Peter Ngueyn MD    Preanesthetic Checklist  Completed: patient identified, IV checked, site marked, risks and benefits discussed, surgical/procedural consents, equipment checked, pre-op evaluation, timeout performed, anesthesia consent given, oxygen available, monitors applied/VS acknowledged, fire risk safety assessment completed and verbalized and blood product R/B/A discussed and consented  Peripheral Block   Patient position: supine  Prep: ChloraPrep  Provider prep: mask and sterile gloves  Patient monitoring: cardiac monitor, continuous pulse ox, continuous capnometry, frequent blood pressure checks, IV access and oxygen  Block type: Femoral  Adductor canal  Laterality: right  Injection technique: single-shot  Guidance: ultrasound guided    Needle   Needle type: insulated echogenic nerve stimulator needle   Needle gauge: 20 G  Needle localization: ultrasound guidance  Needle length: 10 cm  Assessment   Injection assessment: negative aspiration for heme, no paresthesia on injection, local visualized surrounding nerve on ultrasound and no intravascular symptoms  Paresthesia pain: none  Slow fractionated injection: yes  Hemodynamics: stable  Outcomes: uncomplicated and patient tolerated procedure well    Medications Administered  ropivacaine (NAROPIN) injection 0.5% - Perineural   20 mL - 4/8/2025 11:39:00 AM

## 2025-04-08 NOTE — OP NOTE
[address]  [address]                            OPERATIVE REPORT      PATIENT NAME: VIKAS ZAMAN                : 1970  MED REC NO: [id number]                       ROOM: OR  ACCOUNT NO: [id number]                       ADMIT DATE: 2025  PROVIDER: Sarath Uribe MD    DATE OF SERVICE:  2025    PREOPERATIVE DIAGNOSES:  Right ankle fracture dislocation with Garcia type B lateral malleolus fracture and no medial or posterior malleolar fracture.    POSTOPERATIVE DIAGNOSES:  Right ankle fracture dislocation with Garcia type B lateral malleolus fracture and no medial or posterior malleolar fracture, with proximal fibular fracture, stable.    PROCEDURES PERFORMED:  Open reduction and internal fixation of lateral malleolus fracture with 2 cortical lag screws and locking plate fixation.    SURGEON:  Sarath Uribe MD    ASSISTANT:  None.    ANESTHESIA:  LMA with adductor block and ankle block.    ESTIMATED BLOOD LOSS:  20 mL.    SPECIMENS REMOVED:  None.    INTRAOPERATIVE FINDINGS:  Non-comminuted lateral malleolus fracture.     COMPLICATIONS:  None.    IMPLANTS:  Synthes fracture fixation, small fragment.    INDICATIONS:  Unstable ankle injury.    DESCRIPTION OF PROCEDURE:  The patient was brought to the operating room.  After anesthesia applied block, she was placed supine on the operating table, placed under general LMA anesthesia.  Tourniquet was applied to the right lower extremity, prepped and draped in the usual sterile fashion.  Time-out was called verifying the patient, site of surgery, surgical procedure, and preoperative antibiotics.  At this point, the extremity was exsanguinated and tourniquet inflated to 300 mmHg after prepping and draping.  A longitudinal incision was made centered over the lateral malleolus.  Fluoro was brought into place to verify the position of the fracture.  A 5-inch incision was made  along the lateral malleolus.  Dissection was carried sharply through the skin and bluntly through the subcutaneous tissue.  Small bleeders were coagulated as necessary.  The subcutaneous tissue was carefully dissected with sharp and blunt dissection.  Retractors were inserted.  This exposed the lateral malleolus.  A fracture could be palpated beneath this.  An incision was made in periosteum and this was elevated as necessary for exposure.  This gave exposure to an oblique fracture involving the lateral malleolus, which was overall minimally displaced at this point.  Debris was removed from the fracture site and was slightly shortened.  Using a towel clip reduction clamps, the fracture was reduced and keyed into its position without difficulty.  This appeared to give good stable position.  At this point, 2 cortical lag screws were applied by over-drilling the proximal hole and applying 2 interfragmentary screws giving excellent stabilization to the fracture.  It was felt additional 3rd tubular plate would provide additional stability to the fracture.  This was pre-bent and applied and held with a single cortical screw to the fibula.  Two locking screws were placed proximally and 2 distally giving a good 8 cortices of fixation proximally and distally.  The position was checked under AP, lateral, and oblique views.  The ankle mortise was well aligned.  It had good stability when checking with external rotation and valgus stress to the ankle.  This continued to maintain good alignment and length appeared to be okay despite the proximal fibula fracture.  It was elected not to treat the proximal fibular fracture.  The operative site was irrigated.  The fascia was reapproximated with 2-0 Vicryl interrupted sutures to cover the plate.  The skin was then reapproximated with interrupted and running 3-0 nylon sutures.  Sterile dressing was applied and the tourniquet was released for a tourniquet time of 55 minutes.  [facility]

## 2025-04-08 NOTE — ED PROVIDER NOTES
[facility] EMERGENCY DEPARTMENT  EMERGENCY DEPARTMENT ENCOUNTER       Pt Name: Reyna Benavides  MRN: [id number]  Birthdate 1970  Date of evaluation: 4/8/2025  Provider: Emil Rader MD   PCP: Asya Martinez, SHARAD - NP  Note Started: 7:24 AM EDT 4/8/25     CHIEF COMPLAINT       Chief Complaint   Patient presents with   • Foot Injury        HISTORY OF PRESENT ILLNESS: 1 or more elements      History From: Patient       Reyna Benavides is a 54 y.o. female who presents with trauma to the right leg.  Patient reports that she was chopping a tree earlier today where it swung and struck her on her right leg.  Patient reports gross deformity and inability to walk secondary to pain.  Patient denies trauma to the head or loss of consciousness.  Patient reports that her last p.o. intake was yesterday evening at roughly 8 PM.  Patient reports a constant and severe pain in the right ankle     Nursing Notes were all reviewed and agreed with or any disagreements were addressed in the HPI.     REVIEW OF SYSTEMS      Review of Systems     Positives and Pertinent negatives as per HPI.    PAST HISTORY     Past Medical History:  Past Medical History:   Diagnosis Date   • CAD (coronary artery disease) 10/2013   • Colon polyps    • Fatty liver    • GERD (gastroesophageal reflux disease)    • Graves disease     hypothyroid now since procedure   • H. pylori infection    • Headache     sometimes migraines   • Hypertension    • Hypokalemia    • Menopause    • Nausea & vomiting    • Pneumothorax     2012   • Sleep apnea     cpap compliant   • Vitamin D deficiency          Past Surgical History:  Past Surgical History:   Procedure Laterality Date   • CHOLECYSTECTOMY  11/04/2019    Lap rukhsana w/cholangiogram   • COLONOSCOPY     • HEENT      Radioactive thyroid   • HYSTERECTOMY (CERVIX STATUS UNKNOWN)      PARTIAL   • OR UNLISTED PROCEDURE CARDIAC SURGERY  10-13    CARDIAC CATH   • SALPINGO-OOPHORECTOMY     • UPPER GASTROINTESTINAL  complains of severe pain.  Additional fentanyl 50 mcg IV ordered. [AO]      ED Course User Index  [AO] Rosamaria Campos MD         FINAL IMPRESSION     1. Closed displaced fracture of lateral malleolus of right fibula, initial encounter          DISPOSITION/PLAN   DISPOSITION : To the operating room                    I am the Primary Clinician of Record.   Emil Rader MD (electronically signed)      (Please note that parts of this dictation were completed with voice recognition software. Quite often unanticipated grammatical, syntax, homophones, and other interpretive errors are inadvertently transcribed by the computer software. Please disregards these errors. Please excuse any errors that have escaped final proofreading.)         Emil Rader MD  04/10/25 1014

## 2025-04-08 NOTE — H&P
History and Physical    Patient: Reyna Benavides MRN: [id number]  SSN: [id number]    YOB: 1970  Age: 54 y.o.  Sex: female      Subjective:      Reyna Benavides is a 54 y.o. female who got called out from work to help take out a tree earlier this morning at 4 AM.  She states that they were using a chainsaw removing the tree and it kicked back and hit her in her right ankle.  She had immediate onset of pain and deformity of the right ankle.  She was seen in the emergency room where her work boot was removed.  She had obvious deformity.  She was x-rayed showing a fracture dislocation of the ankle.  In addition there was a more proximal fibula fracture noted on the x-ray of the leg.  Orthopedics has been consulted.  Patient complains of pain in the right lower extremity and ankle region only.  She does not have any other pain complaints or report of any other injury.  She has been reduced in the emergency room by the ER physician and placed in a posterior splint with stirrup portions as well.  Postreduction imaging shows overall satisfactory reduction.  Patient initially had requested transfer to an outside facility however patient is familiar with me and preferred to stay for treatment.    She has a past history which appears below.  Most significant for diabetes and a history of coronary artery disease.  She has not had anything to eat or drink this morning and we decided to go ahead and put her on the schedule today for stabilization of her unstable ankle fracture.    Postreduction x-rays demonstrate that she has a Garcia type B fracture to the lateral malleolus.  This appears to be slightly shortened and displaced.  She has some slight widening of the medial mortise which persist.  She has an oblique fracture involving the proximal fibula.  It sounds like the tree hit her in the side of the leg.  My plan will be stabilization of the ankle injury and if it appears that the ankle mortise and fibular length

## 2025-04-08 NOTE — BRIEF OP NOTE
Brief Postoperative Note      Patient: Reyna Benavides  YOB: 1970  MRN: [id number]    Date of Procedure: 4/8/2025    Pre-Op Diagnosis Codes:      * Closed displaced fracture of lateral malleolus of right fibula, initial encounter [S82.61XA]    Post-Op Diagnosis: Same       Procedure(s):  OPEN REDUCTION INTERNAL FIXATION RIGHT ANKLE    Surgeon(s):  Sarath Álvarez MD    Assistant:  * No surgical staff found *    Anesthesia: General    Estimated Blood Loss (mL): less than 50     Complications: None    Specimens:   * No specimens in log *    Implants:  Implant Name Type Inv. Item Serial No.  Lot No. LRB No. Used Action   3.5 mm Cortex Screw, Self Tapping Screw/Plate/Nail/Torres   [employer]  Right 1 Implanted   3.5 mm Cortex Screw, Self Tapping Screw/Plate/Nail/Torres   [employer]  Right 1 Implanted   One-Third Tubular LCP Plate with Collar Screw/Plate/Nail/Torres   [employer]CR  Right 1 Implanted   3.5 mm Cortex Screw, Self Tapping Screw/Plate/Nail/Torres   [employer]  Right 1 Implanted   3.5 mm Locking Screw, Self Tapping Screw/Plate/Nail/Torres   [employer]_CR  Right 2 Implanted   3.5 mm Locking Screw, Self Tapping Screw/Plate/Nail/Torres   [employer]CR  Right 1 Implanted   3.5 mm Locking Screw, Self Tapping Screw/Plate/Nail/Torres   [employer]_CR  Right 1 Implanted         Drains: * No LDAs found *    Findings:  Infection Present At Time Of Surgery (PATOS) (choose all levels that have infection present):  No infection present  Other Findings: None comminuted oblique Garcia type B lateral malleolus fracture with stable ankle mortise following reduction and fixation    Electronically signed by Sarath Álvarez MD on 4/8/2025 at 1:18 PM

## 2025-04-08 NOTE — DISCHARGE INSTRUCTIONS
Sarath Álvarez M.D.                                                                                                                                                                               Lower Extremity Surgery   Discharge Instructions   Sarath Álvarez M.D.           Please take the time to review the following instructions before you leave the hospital/surgery center and use them as guidelines during your recovery from surgery. If you have any questions you may contact my office at [phone].    Wound Care/Dressing Changes    ______Do not remove your dressings or get them wet.    Showering/Bathing    ______Don’t get your incisions wet until the staples or sutures are removed. After the staples or sutures are removed, you may shower as instructed above.    ______Don’t remove your dressings or get them wet until you are seen at your follow-up appointment with Dr. Álvarez. You will be given further instructions at that time.      Weight Bearing Status/Braces    ______Touch toe weight bearing. Don’t bear weight on the leg you had surgery on. Use your toes only to steady yourself as you walk with the aid of crutches, walker or cane.      Exercises    ______Work to fully straighten your knee, attempt to hyperextend  ______Bend your knee as much as you can comfortably tolerate  ______Contract your quadriceps (thigh) muscles and count to 10, repeat 10 times/day    Ice/Elevation    ______Continue ice and elevation consistently for the next 48 hours. You may begin ice and elevation as needed for pain and swelling. You should ice your knee as least three times each day for one week in cycles of ice on for twenty minutes, ice off for twenty minutes. Repeat for a total of two hours each time.    Diet    ______You may advance to your regular diet as tolerated. Increase your clear liquid intake for the next 2 to 3 days.    Medication    ______You will be given a prescription pain medicine when you are  discharged from the hospital/surgery center. Take the medication on an as needed basis according to the directions on the prescription bottle. Possible side effects of this medication include dizziness, headache, nausea, vomiting, constipation, and urinary retention. If you experience any of these side effects, call the office so that we may assist you in relieving them. Stop the use of pain medications if you develop excessive itching, a rash, shortness of breath, or difficulty swallowing. If these symptoms are severe, or are not relieved by stopping the medication, you should seek immediate medical attention. Refills of pain medication are authorized during office hours ONLY (8am-5pm, M-F)    ______You may resume the medications you were taking prior to surgery. Pain medication may potentate the effects of any anti-depressant medication you taking. If you have any questions about possible interactions between you regular medications and the pain medication, you should consult your medical doctor who prescribes your regular medications.    ______You may use Ibuprofen 800 mg three to four times a day for 2-3 days after surgery. This can be used along with your pain medication    ______Begin taking one 81mg Aspirin each day for 3 days, begin on the day of surgery.    Take Home Medications

## 2025-04-08 NOTE — ANESTHESIA PRE PROCEDURE
Component Value Date/Time    HEPCAB NONREACTIVE 05/26/2021 04:30 PM       COVID-19 Screening (If Applicable):   Lab Results   Component Value Date/Time    COVID19 Not detected 06/26/2022 09:33 PM           Anesthesia Evaluation  Patient summary reviewed and Nursing notes reviewed  Airway: Mallampati: II  TM distance: >3 FB   Neck ROM: full  Mouth opening: > = 3 FB   Dental: normal exam         Pulmonary:normal exam  breath sounds clear to auscultation  (+)     sleep apnea: on CPAP,       current smoker          Patient smoked on day of surgery.                 Cardiovascular:    (+) hypertension:, CAD:, hyperlipidemia        Rhythm: regular  Rate: normal                    Neuro/Psych:   (+) headaches: migraine headaches   (-) neuromuscular disease           GI/Hepatic/Renal:   (+) GERD:, liver disease:          Endo/Other:    (+) hypothyroidism::..                 Abdominal: normal exam            Vascular:          Other Findings:       Anesthesia Plan      general and regional     ASA 3       Induction: intravenous.    MIPS: Postoperative opioids intended and Prophylactic antiemetics administered.  Anesthetic plan and risks discussed with patient.              Post-op pain plan if not by surgeon: single peripheral nerve block        Peter Nguyen MD   4/8/2025

## 2025-04-08 NOTE — PROGRESS NOTES
Patient meets criteria for discharge, CAOx4, NAD, no nausea, denies pain, dressing intact, patient fitted for crutches, disposable cool packs given to patient.

## 2025-04-08 NOTE — ED NOTES
TRANSFER - OUT REPORT:    Verbal report given to Jose Liner on Reyna Benvaides  being transferred to OR for ordered procedure       Report consisted of patient's Situation, Background, Assessment and   Recommendations(SBAR).     Information from the following report(s) Nurse Handoff Report, Index, ED SBAR, Intake/Output, MAR, Recent Results, and Procedure Verification was reviewed with the receiving nurse.    [place] Fall Assessment:    Presents to emergency department  because of falls (Syncope, seizure, or loss of consciousness): No  Age > 70: No  Altered Mental Status, Intoxication with alcohol or substance confusion (Disorientation, impaired judgment, poor safety awaremess, or inability to follow instructions): No  Impaired Mobility: Ambulates or transfers with assistive devices or assistance; Unable to ambulate or transer.: Yes  Nursing Judgement: No          Lines:   Peripheral IV 04/08/25 Right Antecubital (Active)   Site Assessment Clean, dry & intact 04/08/25 0630   Line Status Blood return noted;Flushed;Normal saline locked 04/08/25 0630   Phlebitis Assessment No symptoms 04/08/25 0630   Infiltration Assessment 0 04/08/25 0630   Dressing Status New dressing applied;Clean, dry & intact 04/08/25 0630   Dressing Type Transparent 04/08/25 0630   Dressing Intervention New 04/08/25 0630       Peripheral IV 04/08/25 Left;Posterior Hand (Active)   Site Assessment Clean, dry & intact 04/08/25 0646   Line Status Blood return noted;Flushed;Normal saline locked 04/08/25 0646   Phlebitis Assessment No symptoms 04/08/25 0646   Infiltration Assessment 0 04/08/25 0646   Dressing Status Clean, dry & intact 04/08/25 0646   Dressing Type Transparent 04/08/25 0646        Opportunity for questions and clarification was provided.      Patient transported with:  Monitor

## 2025-04-08 NOTE — ANESTHESIA PROCEDURE NOTES
Peripheral Block    Patient location during procedure: procedure area  Reason for block: post-op pain management and at surgeon's request  Start time: 4/8/2025 11:29 AM  End time: 4/8/2025 11:38 AM  Staffing  Performed: anesthesiologist   Anesthesiologist: Peter Nguyen MD  Performed by: Peter Nguyen MD  Authorized by: Peter Nguyen MD    Preanesthetic Checklist  Completed: patient identified, IV checked, site marked, risks and benefits discussed, surgical/procedural consents, equipment checked, pre-op evaluation, timeout performed, anesthesia consent given, oxygen available, monitors applied/VS acknowledged, fire risk safety assessment completed and verbalized and blood product R/B/A discussed and consented  Peripheral Block   Patient position: left lateral decubitus  Prep: ChloraPrep  Provider prep: mask and sterile gloves  Patient monitoring: cardiac monitor, continuous pulse ox, continuous capnometry, frequent blood pressure checks, oxygen and responsive to questions  Block type: Sciatic  Popliteal  Laterality: right  Injection technique: single-shot  Guidance: nerve stimulator and ultrasound guided    Needle   Needle type: insulated echogenic nerve stimulator needle   Needle gauge: 20 G  Needle localization: nerve stimulator and ultrasound guidance  Needle length: 10 cm  Assessment   Injection assessment: negative aspiration for heme, no paresthesia on injection, local visualized surrounding nerve on ultrasound and no intravascular symptoms  Paresthesia pain: none  Slow fractionated injection: yes  Hemodynamics: stable  Outcomes: uncomplicated and patient tolerated procedure well    Medications Administered  fentaNYL (SUBLIMAZE) injection - IntraVENous   100 mcg - 4/8/2025 11:30:00 AM  midazolam (VERSED) injection 2 mg/2mL - IntraVENous   4 mg - 4/8/2025 11:30:00 AM  ropivacaine (NAROPIN) injection 0.5% - Perineural   25 mL - 4/8/2025 11:35:00 AM

## 2025-04-09 RX ORDER — PRAVASTATIN SODIUM 10 MG
10 TABLET ORAL DAILY
Qty: 90 TABLET | Refills: 0 | Status: SHIPPED | OUTPATIENT
Start: 2025-04-09

## 2025-04-09 RX ORDER — CETIRIZINE HYDROCHLORIDE 10 MG/1
TABLET, FILM COATED ORAL
Qty: 90 TABLET | Refills: 0 | Status: SHIPPED | OUTPATIENT
Start: 2025-04-09

## 2025-04-09 RX ORDER — SPIRONOLACTONE 25 MG/1
25 TABLET ORAL DAILY
Qty: 90 TABLET | Refills: 0 | Status: SHIPPED | OUTPATIENT
Start: 2025-04-09

## 2025-04-09 NOTE — ANESTHESIA POSTPROCEDURE EVALUATION
Department of Anesthesiology  Postprocedure Note    Patient: Reyna Benavides  MRN: [id number]  YOB: 1970  Date of evaluation: 4/9/2025    Procedure Summary       Date: 04/08/25 Room / Location: [facility] MAIN OR    Anesthesia Start: 1145 Anesthesia Stop: 1320    Procedure: OPEN REDUCTION INTERNAL FIXATION RIGHT ANKLE (Right: Ankle) Diagnosis:       Closed displaced fracture of lateral malleolus of right fibula, initial encounter      (Closed displaced fracture of lateral malleolus of right fibula, initial encounter [S82.61XA])    Surgeons: Sarath Álvarez MD Responsible Provider: Peter Nguyen MD    Anesthesia Type: general, regional ASA Status: 3            Anesthesia Type: No value filed.    Lorenzo Phase I: Lorenzo Score: 10    Lorenzo Phase II: Lorenzo Score: 10    Anesthesia Post Evaluation    Patient location during evaluation: PACU  Patient participation: complete - patient participated  Level of consciousness: awake  Pain score: 0  Airway patency: patent  Nausea & Vomiting: no nausea  Cardiovascular status: hemodynamically stable  Respiratory status: acceptable  Hydration status: euvolemic  Multimodal analgesia pain management approach  Pain management: adequate    No notable events documented.

## 2025-04-12 ENCOUNTER — HOSPITAL ENCOUNTER (EMERGENCY)
Facility: HOSPITAL | Age: 55
Discharge: HOME | End: 2025-04-12
Attending: EMERGENCY MEDICINE

## 2025-04-12 VITALS
TEMPERATURE: 97 F | HEART RATE: 85 BPM | BODY MASS INDEX: 34.15 KG/M2 | OXYGEN SATURATION: 97 % | HEIGHT: 64 IN | RESPIRATION RATE: 18 BRPM | DIASTOLIC BLOOD PRESSURE: 77 MMHG | SYSTOLIC BLOOD PRESSURE: 151 MMHG | WEIGHT: 200 LBS

## 2025-04-12 DIAGNOSIS — L23.7 CONTACT DERMATITIS DUE TO POISON VINE: Primary | ICD-10-CM

## 2025-04-12 PROCEDURE — 96372 THER/PROPH/DIAG INJ SC/IM: CPT

## 2025-04-12 PROCEDURE — 6360000002 HC RX W HCPCS: Performed by: EMERGENCY MEDICINE

## 2025-04-12 PROCEDURE — 99284 EMERGENCY DEPT VISIT MOD MDM: CPT

## 2025-04-12 RX ORDER — HYDROXYZINE HYDROCHLORIDE 25 MG/1
25 TABLET, FILM COATED ORAL EVERY 8 HOURS PRN
Qty: 20 TABLET | Refills: 0 | Status: SHIPPED | OUTPATIENT
Start: 2025-04-12 | End: 2025-04-16 | Stop reason: SDUPTHER

## 2025-04-12 RX ORDER — DIPHENHYDRAMINE HYDROCHLORIDE 50 MG/ML
50 INJECTION, SOLUTION INTRAMUSCULAR; INTRAVENOUS
Status: COMPLETED | OUTPATIENT
Start: 2025-04-12 | End: 2025-04-12

## 2025-04-12 RX ORDER — PREDNISONE 10 MG/1
30 TABLET ORAL DAILY
Qty: 9 TABLET | Refills: 0 | Status: SHIPPED | OUTPATIENT
Start: 2025-04-12 | End: 2025-04-15

## 2025-04-12 RX ADMIN — DIPHENHYDRAMINE HYDROCHLORIDE 50 MG: 50 INJECTION INTRAMUSCULAR; INTRAVENOUS at 20:34

## 2025-04-12 ASSESSMENT — PAIN - FUNCTIONAL ASSESSMENT: PAIN_FUNCTIONAL_ASSESSMENT: NONE - DENIES PAIN

## 2025-04-12 ASSESSMENT — ENCOUNTER SYMPTOMS: NAUSEA: 0

## 2025-04-13 NOTE — DISCHARGE INSTR - COC
D deficiency E55.9    Hypertension I10    Migraine headache G43.909    Chronic coronary artery disease I25.10    Back pain M54.9    Benign hypertensive heart disease I11.9    Overweight E66.3    Sleep apnea G47.30       Isolation/Infection:   Isolation            No Isolation          Patient Infection Status    None to display         Nurse Assessment:  Last Vital Signs: BP (!) 151/77   Pulse 85   Temp 97 °F (36.1 °C) (Oral)   Resp 18   Ht 1.626 m (5' 4\")   Wt 90.7 kg (200 lb)   SpO2 97%   BMI 34.33 kg/m²     Last documented pain score (0-10 scale):    Last Weight:   Wt Readings from Last 1 Encounters:   04/12/25 90.7 kg (200 lb)     Mental Status:  {IP PT MENTAL STATUS:20030}    IV Access:  { ABNER IV ACCESS:913654905}    Nursing Mobility/ADLs:  Walking   {CHP DME ADLs:398868116}  Transfer  {CHP DME ADLs:957734172}  Bathing  {CHP DME ADLs:890269092}  Dressing  {CHP DME ADLs:129478779}  Toileting  {CHP DME ADLs:405084825}  Feeding  {CHP DME ADLs:478890199}  Med Admin  {P DME ADLs:084560349}  Med Delivery   { ABNER MED Delivery:508658692}    Wound Care Documentation and Therapy:  Wound 04/08/25 Ankle Right S/P ORIF of Right Ankle (Active)   Number of days: 4        Elimination:  Continence:   Bowel: {YES / NO:19727}  Bladder: {YES / NO:19727}  Urinary Catheter: {Urinary Catheter:772826407}   Colostomy/Ileostomy/Ileal Conduit: {YES / NO:19727}       Date of Last BM: ***  No intake or output data in the 24 hours ending 04/12/25 2043  No intake/output data recorded.    Safety Concerns:     { ABNER Safety Concerns:137869021}    Impairments/Disabilities:      { ABNER Impairments/Disabilities:426113861}    Nutrition Therapy:  Current Nutrition Therapy:   { ABNER Diet List:444787033}    Routes of Feeding: {CHP DME Other Feedings:908265404}  Liquids: {Slp liquid thickness:21575}  Daily Fluid Restriction: {CHP DME Yes amt example:114669802}  Last Modified Barium Swallow with Video (Video Swallowing Test): {Done Not

## 2025-04-13 NOTE — ED TRIAGE NOTES
Patient believes she came into contact with poison ivy or poison oak on Tuesday during a work accident. Patient has been using over the counter lotion without relief of the itching.

## 2025-04-13 NOTE — ED PROVIDER NOTES
complaints.  medications, diagnosis, follow up plan and return instructions have been reviewed and discussed with the patient and/or family. Pt and/or family were instructed on symptoms that may arise after discharge requiring re-evaluation by a physician. Pt and/or family have had the opportunity to ask questions about their care. Patient and/or family verbalized understanding and agreement with care plan, follow up and return instructions. Patient and/or family agree to return in 48 hours if their symptoms are not improving or immediately if they have any change in their condition.          I have also put together some discharge instructions for patient that include: 1) educational information regarding their diagnosis, 2) how to care for their diagnosis at home, as well a 3) list of reasons why they would want to return to the ED prior to their follow-up appointment, should their condition change.       Alessandro Moncada MD        Medical Decision Making  Problems Addressed:  Contact dermatitis due to poison vine: acute illness or injury    Amount and/or Complexity of Data Reviewed  Independent Historian: friend    Risk  OTC drugs.  Prescription drug management.      MDM  Reviewed: nursing note and vitals                  Disposition Considerations (Tests considered, Shared Decision Making, Pt Expectation of Test or Tx.):        I am the Primary Clinician of Record.   Alessandro Moncada MD (electronically signed)    (Please note that parts of this dictation were completed with voice recognition software. Quite often unanticipated grammatical, syntax, homophones, and other interpretive errors are inadvertently transcribed by the computer software. Please disregards these errors. Please excuse any errors that have escaped final proofreading.)             MAGNOLIA Moncada MD  04/13/25 7247

## 2025-04-16 ENCOUNTER — OFFICE VISIT (OUTPATIENT)
Age: 55
End: 2025-04-16
Payer: COMMERCIAL

## 2025-04-16 VITALS
SYSTOLIC BLOOD PRESSURE: 120 MMHG | HEIGHT: 64 IN | DIASTOLIC BLOOD PRESSURE: 70 MMHG | TEMPERATURE: 97 F | RESPIRATION RATE: 18 BRPM | OXYGEN SATURATION: 97 % | BODY MASS INDEX: 34.33 KG/M2 | HEART RATE: 66 BPM

## 2025-04-16 DIAGNOSIS — L24.7 IRRITANT CONTACT DERMATITIS DUE TO PLANTS, EXCEPT FOOD: ICD-10-CM

## 2025-04-16 DIAGNOSIS — E11.8 TYPE 2 DIABETES MELLITUS WITH UNSPECIFIED COMPLICATIONS: Primary | ICD-10-CM

## 2025-04-16 PROCEDURE — 3074F SYST BP LT 130 MM HG: CPT | Performed by: NURSE PRACTITIONER

## 2025-04-16 PROCEDURE — 99213 OFFICE O/P EST LOW 20 MIN: CPT | Performed by: NURSE PRACTITIONER

## 2025-04-16 PROCEDURE — 3078F DIAST BP <80 MM HG: CPT | Performed by: NURSE PRACTITIONER

## 2025-04-16 RX ORDER — HYDROXYZINE HYDROCHLORIDE 25 MG/1
25 TABLET, FILM COATED ORAL EVERY 8 HOURS PRN
Qty: 30 TABLET | Refills: 0 | Status: SHIPPED | OUTPATIENT
Start: 2025-04-16 | End: 2025-04-26

## 2025-04-16 ASSESSMENT — PATIENT HEALTH QUESTIONNAIRE - PHQ9
SUM OF ALL RESPONSES TO PHQ QUESTIONS 1-9: 0
SUM OF ALL RESPONSES TO PHQ QUESTIONS 1-9: 0
2. FEELING DOWN, DEPRESSED OR HOPELESS: NOT AT ALL
1. LITTLE INTEREST OR PLEASURE IN DOING THINGS: NOT AT ALL
SUM OF ALL RESPONSES TO PHQ QUESTIONS 1-9: 0
SUM OF ALL RESPONSES TO PHQ QUESTIONS 1-9: 0

## 2025-04-16 NOTE — PROGRESS NOTES
\"Have you been to the ER, urgent care clinic since your last visit?  Hospitalized since your last visit?\"     Yes ER Hospital   4- for fractured right ankle    “Have you seen or consulted any other health care providers outside of [facility] since your last visit?”     Yes oig            Click Here for Release of Records Request      Chief Complaint   Patient presents with    Follow-Up from Hospital     ER for poision IVY ,          Vitals:    04/16/25 1348   BP: 120/70   Pulse: 66   Resp: 18   Temp: 97 °F (36.1 °C)   SpO2: 97%

## 2025-04-16 NOTE — PROGRESS NOTES
Subjective:     Reyna Benavides is a 54 y.o. female who presents today with the following:  Chief Complaint   Patient presents with    Follow-Up from Hospital     ER for poision IVY ,        History of Present Illness  The patient is a 54-year-old female who presents for a follow-up of poison ivy.    Medical attention was sought at [facility] [place] on 04/12/2025 due to the presence of poison ivy in three distinct areas: the crease part of her leg on both the right and left sides, and her neck. The condition has since improved, with only minimal itching remaining. 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 Treatment included a Benadryl injection and prescriptions for prednisone and hydroxyzine, which were taken three times daily to alleviate the itching. Calamine lotion has also been used as part of the treatment regimen.    Additionally, injuries were sustained to the right ankle, femur, tibia, and fibula during the same incident. An appointment is scheduled with Dr. Klein on 04/17/2025 to discuss the duration of the cast application. Currently, work is not being performed due to these health issues.    An upcoming appointment with Emelia is scheduled for 04/18/2025 to address thyroid concerns. It is noted that prednisone treatment for poison ivy may affect blood glucose levels, and this information should be communicated during the thyroid appointment.    Patient Active Problem List   Diagnosis    Abnormal vaginal bleeding    Biliary dyskinesia    Graves disease    Pelvic mass    Hypothyroidism    Vertigo    Severe obesity    Hypokalemia    Vitamin D deficiency    Hypertension    Migraine headache    Chronic coronary artery disease    Back pain    Benign hypertensive heart disease    Overweight    Sleep apnea    Type 2 diabetes mellitus with unspecified complications           depression screening addressed not at risk    abuse screening addressed denies    learning assessment

## 2025-04-23 ENCOUNTER — TRANSCRIBE ORDERS (OUTPATIENT)
Facility: HOSPITAL | Age: 55
End: 2025-04-23

## 2025-04-23 ENCOUNTER — HOSPITAL ENCOUNTER (OUTPATIENT)
Facility: HOSPITAL | Age: 55
Discharge: HOME | End: 2025-04-26
Payer: COMMERCIAL

## 2025-04-23 DIAGNOSIS — S82.61XA CLOSED DISPLACED FRACTURE OF LATERAL MALLEOLUS OF RIGHT FIBULA, INITIAL ENCOUNTER: Primary | ICD-10-CM

## 2025-04-23 DIAGNOSIS — S82.61XA CLOSED DISPLACED FRACTURE OF LATERAL MALLEOLUS OF RIGHT FIBULA, INITIAL ENCOUNTER: ICD-10-CM

## 2025-04-23 PROCEDURE — 73610 X-RAY EXAM OF ANKLE: CPT

## 2025-05-02 ENCOUNTER — TRANSCRIBE ORDERS (OUTPATIENT)
Facility: HOSPITAL | Age: 55
End: 2025-05-02

## 2025-05-02 DIAGNOSIS — E03.9 HYPOTHYROIDISM, UNSPECIFIED TYPE: Primary | ICD-10-CM

## 2025-05-05 DIAGNOSIS — M25.571 ACUTE RIGHT ANKLE PAIN: Primary | ICD-10-CM

## 2025-05-05 RX ORDER — TRAMADOL HYDROCHLORIDE 50 MG/1
50 TABLET ORAL EVERY 4 HOURS PRN
Qty: 18 TABLET | Refills: 0 | Status: SHIPPED | OUTPATIENT
Start: 2025-05-05 | End: 2025-05-08

## 2025-05-05 NOTE — PROGRESS NOTES
Patient is 3 weeks postop fixation of ankle fracture.  She called the office stating she is still pain at nighttime unable to sleep.  Cause some additional tramadol in for her.  Explained to her that we cannot continue with opiate pain medications much longer for fear inducing worsening dependence upon these medications.  Have asked that she make sure she is not running any fevers or is having any sort of redness or drainage around the incision.

## 2025-05-13 ENCOUNTER — HOSPITAL ENCOUNTER (OUTPATIENT)
Facility: HOSPITAL | Age: 55
Discharge: HOME | End: 2025-05-16
Payer: COMMERCIAL

## 2025-05-13 ENCOUNTER — TRANSCRIBE ORDERS (OUTPATIENT)
Facility: HOSPITAL | Age: 55
End: 2025-05-13

## 2025-05-13 DIAGNOSIS — S82.61XC: ICD-10-CM

## 2025-05-13 DIAGNOSIS — E03.9 HYPOTHYROIDISM, UNSPECIFIED TYPE: ICD-10-CM

## 2025-05-13 DIAGNOSIS — S82.61XC: Primary | ICD-10-CM

## 2025-05-13 PROCEDURE — 76536 US EXAM OF HEAD AND NECK: CPT

## 2025-05-13 PROCEDURE — 73610 X-RAY EXAM OF ANKLE: CPT

## 2025-05-14 RX ORDER — TOPIRAMATE 50 MG/1
50 TABLET, FILM COATED ORAL NIGHTLY
Qty: 90 TABLET | Refills: 0 | Status: SHIPPED | OUTPATIENT
Start: 2025-05-14

## 2025-06-02 ENCOUNTER — CLINICAL DOCUMENTATION (OUTPATIENT)
Age: 55
End: 2025-06-02

## 2025-06-02 ENCOUNTER — OFFICE VISIT (OUTPATIENT)
Age: 55
End: 2025-06-02
Payer: COMMERCIAL

## 2025-06-02 VITALS
OXYGEN SATURATION: 100 % | RESPIRATION RATE: 18 BRPM | BODY MASS INDEX: 34.33 KG/M2 | HEIGHT: 64 IN | HEART RATE: 90 BPM | TEMPERATURE: 97.9 F | DIASTOLIC BLOOD PRESSURE: 80 MMHG | SYSTOLIC BLOOD PRESSURE: 128 MMHG

## 2025-06-02 DIAGNOSIS — M25.512 ACUTE PAIN OF LEFT SHOULDER: Primary | ICD-10-CM

## 2025-06-02 PROCEDURE — 99213 OFFICE O/P EST LOW 20 MIN: CPT

## 2025-06-02 PROCEDURE — 96372 THER/PROPH/DIAG INJ SC/IM: CPT

## 2025-06-02 PROCEDURE — 3074F SYST BP LT 130 MM HG: CPT

## 2025-06-02 PROCEDURE — 3079F DIAST BP 80-89 MM HG: CPT

## 2025-06-02 RX ORDER — KETOROLAC TROMETHAMINE 30 MG/ML
30 INJECTION, SOLUTION INTRAMUSCULAR; INTRAVENOUS ONCE
Status: COMPLETED | OUTPATIENT
Start: 2025-06-02 | End: 2025-06-02

## 2025-06-02 RX ADMIN — KETOROLAC TROMETHAMINE 30 MG: 30 INJECTION, SOLUTION INTRAMUSCULAR; INTRAVENOUS at 09:47

## 2025-06-02 SDOH — ECONOMIC STABILITY: FOOD INSECURITY: WITHIN THE PAST 12 MONTHS, YOU WORRIED THAT YOUR FOOD WOULD RUN OUT BEFORE YOU GOT MONEY TO BUY MORE.: NEVER TRUE

## 2025-06-02 SDOH — ECONOMIC STABILITY: FOOD INSECURITY: WITHIN THE PAST 12 MONTHS, THE FOOD YOU BOUGHT JUST DIDN'T LAST AND YOU DIDN'T HAVE MONEY TO GET MORE.: NEVER TRUE

## 2025-06-02 ASSESSMENT — PATIENT HEALTH QUESTIONNAIRE - PHQ9
SUM OF ALL RESPONSES TO PHQ QUESTIONS 1-9: 0
2. FEELING DOWN, DEPRESSED OR HOPELESS: NOT AT ALL
SUM OF ALL RESPONSES TO PHQ QUESTIONS 1-9: 0
1. LITTLE INTEREST OR PLEASURE IN DOING THINGS: NOT AT ALL

## 2025-06-02 NOTE — PROGRESS NOTES
Danyelle tidwell called for notes from 6-2-25 visit so they can determine if her shoulder would be workers comp related . Notes faxed to [phone]

## 2025-06-02 NOTE — PROGRESS NOTES
Chief Complaint   Patient presents with    Shoulder Pain     Left from [facility]         HPI:      History of Present Illness  The patient is a 54-year-old female who presents for evaluation of shoulder pain.    Shoulder Pain  She has been experiencing shoulder discomfort for approximately 3 weeks, which she attributes to the use of crutches following an ankle injury that occurred in 04/2025. The pain intensifies upon lifting her arm, limiting her range of motion. She reports that the pain is localized to a specific area of her shoulder and does not radiate. She also notes that the pain is exacerbated by cold weather. She has been managing the pain with a heating pad and is seeking a more effective treatment option. She is currently undergoing physical therapy for her ankle at [facility] in [place] and has informed her worker's compensation nurse about her shoulder issue.  - Onset: Approximately 3 weeks ago.  - Location: Specific area of the shoulder.  - Duration: Persistent for 3 weeks.  - Character: Discomfort, intensifies upon lifting arm, limits range of motion.  - Alleviating/Aggravating Factors: Exacerbated by cold weather; managed with a heating pad.  - Severity: Limits range of motion.      Allergies   Allergen Reactions    Hydromorphone      Other reaction(s): Unknown (comments)  Respiratory suppression leading to intubation       Current Outpatient Medications   Medication Sig Dispense Refill    topiramate (TOPAMAX) 50 MG tablet Take 1 tablet by mouth nightly 90 tablet 0    EQ ALLERGY RELIEF, CETIRIZINE, 10 MG tablet TAKE 1 TABLET BY MOUTH AT BEDTIME AS NEEDED FOR ALLERGIES 90 tablet 0    pravastatin (PRAVACHOL) 10 MG tablet Take 1 tablet by mouth once daily 90 tablet 0    spironolactone (ALDACTONE) 25 MG tablet Take 1 tablet by mouth once daily 90 tablet 0    ibuprofen (ADVIL;MOTRIN) 800 MG tablet Take 1 tablet by mouth 4 times daily as needed for Pain 120 tablet 0    benazepril (LOTENSIN) 40 MG tablet

## 2025-06-02 NOTE — PROGRESS NOTES
\"Have you been to the ER, urgent care clinic since your last visit?  Hospitalized since your last visit?\"    no    “Have you seen or consulted any other health care providers outside of [facility] since your last visit?”    Yes DR Hoskins and Marco A            Click Here for Release of Records Request      Chief Complaint   Patient presents with    Shoulder Pain     Left from [facility]           Vitals:    06/02/25 0927   BP: 128/80   Pulse: 90   Resp: 18   Temp: 97.9 °F (36.6 °C)   SpO2: 100%

## 2025-06-10 RX ORDER — AMLODIPINE BESYLATE 10 MG/1
10 TABLET ORAL DAILY
Qty: 90 TABLET | Refills: 0 | Status: SHIPPED | OUTPATIENT
Start: 2025-06-10

## 2025-06-10 RX ORDER — BENAZEPRIL HYDROCHLORIDE 40 MG/1
40 TABLET ORAL DAILY
Qty: 90 TABLET | Refills: 0 | Status: SHIPPED | OUTPATIENT
Start: 2025-06-10

## 2025-06-11 ENCOUNTER — TRANSCRIBE ORDERS (OUTPATIENT)
Facility: HOSPITAL | Age: 55
End: 2025-06-11

## 2025-06-11 ENCOUNTER — HOSPITAL ENCOUNTER (OUTPATIENT)
Facility: HOSPITAL | Age: 55
Discharge: HOME | End: 2025-06-14
Payer: COMMERCIAL

## 2025-06-11 DIAGNOSIS — S82.61XD CLOSED DISPLACED FRACTURE OF LATERAL MALLEOLUS OF RIGHT FIBULA WITH ROUTINE HEALING, SUBSEQUENT ENCOUNTER: ICD-10-CM

## 2025-06-11 DIAGNOSIS — S82.61XD CLOSED DISPLACED FRACTURE OF LATERAL MALLEOLUS OF RIGHT FIBULA WITH ROUTINE HEALING, SUBSEQUENT ENCOUNTER: Primary | ICD-10-CM

## 2025-06-11 PROCEDURE — 73610 X-RAY EXAM OF ANKLE: CPT

## 2025-06-19 ENCOUNTER — OFFICE VISIT (OUTPATIENT)
Age: 55
End: 2025-06-19
Payer: COMMERCIAL

## 2025-06-19 VITALS
OXYGEN SATURATION: 100 % | WEIGHT: 209 LBS | RESPIRATION RATE: 18 BRPM | DIASTOLIC BLOOD PRESSURE: 80 MMHG | HEART RATE: 64 BPM | BODY MASS INDEX: 35.68 KG/M2 | HEIGHT: 64 IN | SYSTOLIC BLOOD PRESSURE: 120 MMHG | TEMPERATURE: 97.3 F

## 2025-06-19 DIAGNOSIS — E55.9 VITAMIN D DEFICIENCY: ICD-10-CM

## 2025-06-19 DIAGNOSIS — E03.9 ACQUIRED HYPOTHYROIDISM: ICD-10-CM

## 2025-06-19 DIAGNOSIS — I10 PRIMARY HYPERTENSION: Primary | ICD-10-CM

## 2025-06-19 DIAGNOSIS — R35.0 URINARY FREQUENCY: ICD-10-CM

## 2025-06-19 DIAGNOSIS — E11.8 TYPE 2 DIABETES MELLITUS WITH UNSPECIFIED COMPLICATIONS (HCC): ICD-10-CM

## 2025-06-19 LAB
BILIRUBIN, URINE, POC: NEGATIVE
BLOOD URINE, POC: ABNORMAL
GLUCOSE URINE, POC: NEGATIVE
KETONES, URINE, POC: NEGATIVE
LEUKOCYTE ESTERASE, URINE, POC: NEGATIVE
NITRITE, URINE, POC: NEGATIVE
PH, URINE, POC: 6.5 (ref 4.6–8)
PROTEIN,URINE, POC: NEGATIVE
SPECIFIC GRAVITY, URINE, POC: 1.02 (ref 1–1.03)
URINALYSIS CLARITY, POC: CLEAR
URINALYSIS COLOR, POC: YELLOW
UROBILINOGEN, POC: ABNORMAL MG/DL

## 2025-06-19 PROCEDURE — 81001 URINALYSIS AUTO W/SCOPE: CPT | Performed by: NURSE PRACTITIONER

## 2025-06-19 PROCEDURE — 99214 OFFICE O/P EST MOD 30 MIN: CPT | Performed by: NURSE PRACTITIONER

## 2025-06-19 PROCEDURE — 3079F DIAST BP 80-89 MM HG: CPT | Performed by: NURSE PRACTITIONER

## 2025-06-19 PROCEDURE — 3074F SYST BP LT 130 MM HG: CPT | Performed by: NURSE PRACTITIONER

## 2025-06-19 ASSESSMENT — PATIENT HEALTH QUESTIONNAIRE - PHQ9
SUM OF ALL RESPONSES TO PHQ QUESTIONS 1-9: 0
1. LITTLE INTEREST OR PLEASURE IN DOING THINGS: NOT AT ALL
SUM OF ALL RESPONSES TO PHQ QUESTIONS 1-9: 0
2. FEELING DOWN, DEPRESSED OR HOPELESS: NOT AT ALL
SUM OF ALL RESPONSES TO PHQ QUESTIONS 1-9: 0
SUM OF ALL RESPONSES TO PHQ QUESTIONS 1-9: 0

## 2025-06-19 NOTE — PROGRESS NOTES
\"Have you been to the ER, urgent care clinic since your last visit?  Hospitalized since your last visit?\"    NO    “Have you seen or consulted any other health care providers outside of [facility] since your last visit?”    NO            Click Here for Release of Records Request      Chief Complaint   Patient presents with    Hypertension     checkup    Urinary Frequency         Vitals:    06/19/25 0911   BP: 120/80   Pulse: 64   Resp: 18   Temp: 97.3 °F (36.3 °C)   SpO2: 100%

## 2025-06-19 NOTE — PROGRESS NOTES
Subjective:     Reyna Benavides is a 54 y.o. female who presents today with the following:  Chief Complaint   Patient presents with    Hypertension     checkup    Urinary Frequency       History of Present Illness  The patient is a 54-year-old female who presents for evaluation of bladder issues, diabetes, thyroid disorder, migraines, hemifacial spasms, and health maintenance.    She reports experiencing urinary frequency, necessitating urination approximately every hour. She does not experience any dysuria or hematuria.    She continues to be under the care of Dr. Grijalva for her thyroid and diabetes management, with her last consultation on 04/18/2025. She has expressed a desire to have her diabetes and kidney function evaluated due to a family history of these conditions in her mother. She undergoes regular ophthalmological examinations, with the most recent one yielding normal results. She is scheduled for annual breast cancer screening at the [facility] in 12/2025.    She is still going to [place] for her migraines, which are going well. She was told that she has hemifacial spasms.    She is currently on workmen's compensation and is undergoing therapy. She had a shoulder problem due to the crutches, which was causing pressure on the nerves and muscles, leading to rotator cuff issues. She was advised to take therapy for it.    SOCIAL HISTORY  She is on workmen's compensation. She has successfully discontinued the use of NicoDerm.    FAMILY HISTORY  Her mother had diabetes and kidney issues.    Patient Active Problem List   Diagnosis    Abnormal vaginal bleeding    Biliary dyskinesia    Graves disease    Pelvic mass    Hypothyroidism    Vertigo    Severe obesity (HCC)    Hypokalemia    Vitamin D deficiency    Hypertension    Migraine headache    Chronic coronary artery disease    Back pain    Benign hypertensive heart disease    Overweight    Sleep apnea    Type 2 diabetes mellitus with unspecified

## 2025-06-19 NOTE — ASSESSMENT & PLAN NOTE
Monitored by specialist- no acute findings meriting change in the plan    Orders:    COLLECTION VENOUS BLOOD,VENIPUNCTURE    Lipid Panel; Future    Comprehensive Metabolic Panel; Future    CBC with Auto Differential; Future    Vitamin D 25 Hydroxy; Future    TSH + Free T4 Panel; Future    Albumin/Creatinine Ratio, Urine; Future

## 2025-06-19 NOTE — ASSESSMENT & PLAN NOTE
Monitored by specialist- no acute findings meriting change in the plan    Orders:    COLLECTION VENOUS BLOOD,VENIPUNCTURE    TSH + Free T4 Panel; Future

## 2025-06-19 NOTE — ASSESSMENT & PLAN NOTE
Monitored by specialist- no acute findings meriting change in the plan    Orders:    COLLECTION VENOUS BLOOD,VENIPUNCTURE    Vitamin D 25 Hydroxy; Future

## 2025-06-19 NOTE — ASSESSMENT & PLAN NOTE
BP in goal no change in plan of care.    Orders:    COLLECTION VENOUS BLOOD,VENIPUNCTURE    Lipid Panel; Future    Comprehensive Metabolic Panel; Future    CBC with Auto Differential; Future    Albumin/Creatinine Ratio, Urine; Future

## 2025-06-20 ENCOUNTER — RESULTS FOLLOW-UP (OUTPATIENT)
Age: 55
End: 2025-06-20

## 2025-06-21 LAB
25(OH)D3 SERPL-MCNC: 48 NG/ML (ref 30–100)
ALBUMIN SERPL-MCNC: 4.4 G/DL (ref 3.5–5)
ALBUMIN/GLOB SERPL: 1.5 (ref 1.1–2.2)
ALP SERPL-CCNC: 94 U/L (ref 45–117)
ALT SERPL-CCNC: 31 U/L (ref 12–78)
ANION GAP SERPL CALC-SCNC: 6 MMOL/L (ref 2–12)
AST SERPL-CCNC: 18 U/L (ref 15–37)
BACTERIA SPEC CULT: NORMAL
BASOPHILS # BLD: 0.05 K/UL (ref 0–0.1)
BASOPHILS NFR BLD: 1.1 % (ref 0–1)
BILIRUB SERPL-MCNC: 0.3 MG/DL (ref 0.2–1)
BUN SERPL-MCNC: 21 MG/DL (ref 6–20)
BUN/CREAT SERPL: 27 (ref 12–20)
CALCIUM SERPL-MCNC: 10 MG/DL (ref 8.5–10.1)
CHLORIDE SERPL-SCNC: 105 MMOL/L (ref 97–108)
CHOLEST SERPL-MCNC: 233 MG/DL
CO2 SERPL-SCNC: 29 MMOL/L (ref 21–32)
CREAT SERPL-MCNC: 0.78 MG/DL (ref 0.55–1.02)
CREAT UR-MCNC: 154 MG/DL
DIFFERENTIAL METHOD BLD: ABNORMAL
EOSINOPHIL # BLD: 0.08 K/UL (ref 0–0.4)
EOSINOPHIL NFR BLD: 1.8 % (ref 0–7)
ERYTHROCYTE [DISTWIDTH] IN BLOOD BY AUTOMATED COUNT: 12.9 % (ref 11.5–14.5)
GLOBULIN SER CALC-MCNC: 3 G/DL (ref 2–4)
GLUCOSE SERPL-MCNC: 90 MG/DL (ref 65–100)
HCT VFR BLD AUTO: 41.7 % (ref 35–47)
HDLC SERPL-MCNC: 99 MG/DL
HDLC SERPL: 2.4 (ref 0–5)
HGB BLD-MCNC: 12.9 G/DL (ref 11.5–16)
IMM GRANULOCYTES # BLD AUTO: 0.01 K/UL (ref 0–0.04)
IMM GRANULOCYTES NFR BLD AUTO: 0.2 % (ref 0–0.5)
LDLC SERPL CALC-MCNC: 118.8 MG/DL (ref 0–100)
LYMPHOCYTES # BLD: 1.39 K/UL (ref 0.8–3.5)
LYMPHOCYTES NFR BLD: 31.1 % (ref 12–49)
MCH RBC QN AUTO: 31.8 PG (ref 26–34)
MCHC RBC AUTO-ENTMCNC: 30.9 G/DL (ref 30–36.5)
MCV RBC AUTO: 102.7 FL (ref 80–99)
MICROALBUMIN UR-MCNC: 2.34 MG/DL
MICROALBUMIN/CREAT UR-RTO: 15 MG/G (ref 0–30)
MONOCYTES # BLD: 0.39 K/UL (ref 0–1)
MONOCYTES NFR BLD: 8.7 % (ref 5–13)
NEUTS SEG # BLD: 2.55 K/UL (ref 1.8–8)
NEUTS SEG NFR BLD: 57.1 % (ref 32–75)
NRBC # BLD: 0 K/UL (ref 0–0.01)
NRBC BLD-RTO: 0 PER 100 WBC
PLATELET # BLD AUTO: 260 K/UL (ref 150–400)
PMV BLD AUTO: 10.5 FL (ref 8.9–12.9)
POTASSIUM SERPL-SCNC: 4 MMOL/L (ref 3.5–5.1)
PROT SERPL-MCNC: 7.4 G/DL (ref 6.4–8.2)
RBC # BLD AUTO: 4.06 M/UL (ref 3.8–5.2)
SERVICE CMNT-IMP: NORMAL
SODIUM SERPL-SCNC: 140 MMOL/L (ref 136–145)
T4 FREE SERPL-MCNC: 0.8 NG/DL (ref 0.8–1.5)
TRIGL SERPL-MCNC: 76 MG/DL
TSH SERPL DL<=0.05 MIU/L-ACNC: 3.03 UIU/ML (ref 0.36–3.74)
VLDLC SERPL CALC-MCNC: 15.2 MG/DL
WBC # BLD AUTO: 4.5 K/UL (ref 3.6–11)

## 2025-07-07 RX ORDER — CETIRIZINE HYDROCHLORIDE 10 MG/1
TABLET ORAL
Qty: 90 TABLET | Refills: 0 | Status: SHIPPED | OUTPATIENT
Start: 2025-07-07

## 2025-07-07 RX ORDER — SPIRONOLACTONE 25 MG/1
25 TABLET ORAL DAILY
Qty: 90 TABLET | Refills: 0 | Status: SHIPPED | OUTPATIENT
Start: 2025-07-07

## 2025-07-07 RX ORDER — PRAVASTATIN SODIUM 10 MG
10 TABLET ORAL DAILY
Qty: 90 TABLET | Refills: 0 | Status: SHIPPED | OUTPATIENT
Start: 2025-07-07

## 2025-08-27 ENCOUNTER — OFFICE VISIT (OUTPATIENT)
Age: 55
End: 2025-08-27
Payer: COMMERCIAL

## 2025-08-27 VITALS
DIASTOLIC BLOOD PRESSURE: 72 MMHG | OXYGEN SATURATION: 98 % | TEMPERATURE: 97.8 F | HEART RATE: 75 BPM | RESPIRATION RATE: 18 BRPM | SYSTOLIC BLOOD PRESSURE: 108 MMHG | HEIGHT: 67 IN | BODY MASS INDEX: 34.91 KG/M2 | WEIGHT: 222.4 LBS

## 2025-08-27 DIAGNOSIS — E03.9 ACQUIRED HYPOTHYROIDISM: ICD-10-CM

## 2025-08-27 DIAGNOSIS — Z76.89 ENCOUNTER FOR WEIGHT MANAGEMENT: Primary | ICD-10-CM

## 2025-08-27 PROCEDURE — 3074F SYST BP LT 130 MM HG: CPT | Performed by: NURSE PRACTITIONER

## 2025-08-27 PROCEDURE — 3078F DIAST BP <80 MM HG: CPT | Performed by: NURSE PRACTITIONER

## 2025-08-27 PROCEDURE — 99214 OFFICE O/P EST MOD 30 MIN: CPT | Performed by: NURSE PRACTITIONER

## 2025-08-27 SDOH — ECONOMIC STABILITY: FOOD INSECURITY: WITHIN THE PAST 12 MONTHS, THE FOOD YOU BOUGHT JUST DIDN'T LAST AND YOU DIDN'T HAVE MONEY TO GET MORE.: NEVER TRUE

## 2025-08-27 SDOH — ECONOMIC STABILITY: FOOD INSECURITY: WITHIN THE PAST 12 MONTHS, YOU WORRIED THAT YOUR FOOD WOULD RUN OUT BEFORE YOU GOT MONEY TO BUY MORE.: NEVER TRUE

## 2025-08-27 ASSESSMENT — PATIENT HEALTH QUESTIONNAIRE - PHQ9
SUM OF ALL RESPONSES TO PHQ QUESTIONS 1-9: 0
SUM OF ALL RESPONSES TO PHQ QUESTIONS 1-9: 0
1. LITTLE INTEREST OR PLEASURE IN DOING THINGS: NOT AT ALL
SUM OF ALL RESPONSES TO PHQ QUESTIONS 1-9: 0
SUM OF ALL RESPONSES TO PHQ QUESTIONS 1-9: 0
2. FEELING DOWN, DEPRESSED OR HOPELESS: NOT AT ALL

## (undated) DEVICE — PACK DRAPE C ARM FTSWTCH ELITE MINIVIEW STRL

## (undated) DEVICE — BIT DRL L125MM DIA2.7MM QUIK CPL 3 FLUT

## (undated) DEVICE — LAPAROSCOPIC TROCAR SLEEVE/SINGLE USE: Brand: KII® OPTICAL ACCESS SYSTEM

## (undated) DEVICE — MARKER,SKIN,WI/RULER AND LABELS: Brand: MEDLINE

## (undated) DEVICE — SYR LR LCK 1ML GRAD NSAF 30ML --

## (undated) DEVICE — SUTURE SZ 0 27IN 5/8 CIR UR-6  TAPER PT VIOLET ABSRB VICRYL J603H

## (undated) DEVICE — STOCKINETTE,IMPERVIOUS,12X48,STERILE: Brand: MEDLINE

## (undated) DEVICE — INFECTION CONTROL KIT SYS

## (undated) DEVICE — BIT DRL L110MM DIA2.5MM G QUIK CPL W/O STP REUSE

## (undated) DEVICE — PADDING CAST W6INXL4YD RAYON UNDERCAST SOF-ROL

## (undated) DEVICE — INTENDED FOR TISSUE SEPARATION, AND OTHER PROCEDURES THAT REQUIRE A SHARP SURGICAL BLADE TO PUNCTURE OR CUT.: Brand: BARD-PARKER SAFETY BLADES SIZE 15, STERILE

## (undated) DEVICE — DRAPE,U/ SHT,SPLIT,PLAS,STERIL: Brand: MEDLINE

## (undated) DEVICE — GOWN,SIRUS,POLYRNF,RAGLAN,XL,ST,30/CS: Brand: MEDLINE

## (undated) DEVICE — GAUZE,SPONGE,4"X4",16PLY,STRL,LF,10/TRAY: Brand: MEDLINE

## (undated) DEVICE — INTENDED USE FOR SURGICAL MARKING ON INTACT SKIN, ALSO PROVIDES A PERMANENT METHOD OF IDENTIFYING OBJECTS IN THE OPERATING ROOM: Brand: WRITESITE® REGULAR TIP SKIN MARKER

## (undated) DEVICE — SOLUTION IRRIG 1000ML 09% SOD CHL USP PIC PLAS CONTAINER

## (undated) DEVICE — TUBING, SUCTION, 1/4" X 10', STRAIGHT: Brand: MEDLINE

## (undated) DEVICE — CLEAN UP KIT: Brand: MEDLINE INDUSTRIES, INC.

## (undated) DEVICE — PENCIL ES L3M BTTN SWCH S STL HEX LOK BLDE ELECTRD HOLSTER

## (undated) DEVICE — SOLUTION IRRIG 3000ML LAC RINGERS ARTHROMTC PLAS CONT

## (undated) DEVICE — SYRINGE 20ML LL S/C 50

## (undated) DEVICE — BLANKET WRM W29.9XL79.1IN UP BODY FORC AIR MISTRAL-AIR

## (undated) DEVICE — INTENDED FOR TISSUE SEPARATION, AND OTHER PROCEDURES THAT REQUIRE A SHARP SURGICAL BLADE TO PUNCTURE OR CUT.: Brand: BARD-PARKER SAFETY BLADES SIZE 11, STERILE

## (undated) DEVICE — SYRINGE IRRIG 60ML SFT PLIABLE BLB EZ TO GRP 1 HND USE W/

## (undated) DEVICE — APPLICATOR SCRB 26ML TEAL STRL -- CHLORAPREP 26ML

## (undated) DEVICE — Device

## (undated) DEVICE — REM POLYHESIVE ADULT PATIENT RETURN ELECTRODE: Brand: VALLEYLAB

## (undated) DEVICE — (D)PREP SKN CHLRAPRP APPL 26ML -- CONVERT TO ITEM 371833

## (undated) DEVICE — ROCKER SWITCH PENCIL BLADE ELECTRODE, HOLSTER: Brand: EDGE

## (undated) DEVICE — DUAL LUMEN STOMACH TUBE MULTI-FUNCTIONAL PORT: Brand: SALEM SUMP

## (undated) DEVICE — APPLIER CLP M/L SHFT DIA5MM 15 LIG LIGAMAX 5

## (undated) DEVICE — SUTURE ETHLN SZ 3-0 L18IN NONABSORBABLE BLK FS-1 L24MM 3/8 663H

## (undated) DEVICE — BANDAGE,ELASTIC,ESMARK,STERILE,6"X9',LF: Brand: MEDLINE

## (undated) DEVICE — NEEDLE HYPO 21GA L1.5IN GRN POLYPR HUB S STL REG BVL STR

## (undated) DEVICE — Z DISCONTINUED NO SUB IDED SET EXTN W/ 4 W STPCOCK M SPIN LOK 36IN

## (undated) DEVICE — BLADE SHV L13CM DIA4MM EXCALIBUR AGG COOLCUT

## (undated) DEVICE — CONTINU-FLO SOLUTION SET, 2 INJECTION SITES, MALE LUER LOCK ADAPTER WITH RETRACTABLE COLLAR, LARGE BORE STOPCOCK WITH ROTATING MALE LUER LOCK EXTENSION SET, 2 INJECTION SITES, MALE LUER LOCK ADAPTER WITH RETRACTABLE COLLAR: Brand: INTERLINK/CONTINU-FLO

## (undated) DEVICE — STERILE POLYISOPRENE POWDER-FREE SURGICAL GLOVES: Brand: PROTEXIS

## (undated) DEVICE — SCREW BNE L10MM DIA3.5MM CORT S STL ST LOK FULL THRD
Type: IMPLANTABLE DEVICE | Site: ANKLE | Status: NON-FUNCTIONAL
Removed: 2025-04-08

## (undated) DEVICE — SUTURE ETHILON SZ 3-0 L18IN NONABSORBABLE BLK L24MM FS-1 3/8 663G

## (undated) DEVICE — ZIMMER® STERILE DISPOSABLE TOURNIQUET CUFF WITH PROTECTIVE SLEEVE AND PLC, DUAL PORT, SINGLE BLADDER, 42 IN. (107 CM)

## (undated) DEVICE — TROCARS: Brand: KII® BLUNT TIP ACCESS SYSTEM

## (undated) DEVICE — 1230 DOUBLE MAGNET NEEDLE COUNTER,BLACK: Brand: DEVON

## (undated) DEVICE — COVER LT HNDL PLAS RIG 1 PER PK

## (undated) DEVICE — INTENDED FOR TISSUE SEPARATION, AND OTHER PROCEDURES THAT REQUIRE A SHARP SURGICAL BLADE TO PUNCTURE OR CUT.: Brand: BARD-PARKER SAFETY BLADES SIZE 10, STERILE

## (undated) DEVICE — PACK,ARTHROSCOPY I,SIRUS: Brand: MEDLINE

## (undated) DEVICE — SHEET,DRAPE,53X77,STERILE: Brand: MEDLINE

## (undated) DEVICE — SOLUTION IRRIGATION NACL 0.9% 1000 ML FLX CONTAINER

## (undated) DEVICE — SPECIMEN COLLECTION 4-PORT MANIFOLD: Brand: NEPTUNE 2

## (undated) DEVICE — PACK,SET UP,NO DRAPES: Brand: MEDLINE

## (undated) DEVICE — BLANKET WRM AD PREM MISTRAL-AIR

## (undated) DEVICE — NEEDLE HYPO 18GA L1.5IN PNK POLYPR HUB S STL THN WALL FILL

## (undated) DEVICE — KENDALL DL ECG CABLE AND LEAD WIRE SYSTEM, 3-LEAD, SINGLE PATIENT USE: Brand: KENDALL

## (undated) DEVICE — SPLINT QUICK STEP SCOTCHCAST 5 X 30

## (undated) DEVICE — SOLUTION LACTATED RINGERS INJECTION USP

## (undated) DEVICE — ZIMMER® STERILE DISPOSABLE TOURNIQUET CUFF WITH PROTECTIVE SLEEVE AND PLC, DUAL PORT, SINGLE BLADDER, 34 IN. (86 CM)

## (undated) DEVICE — TOWEL,OR,DSP,ST,BLUE,STD,4/PK,20PK/CS: Brand: MEDLINE

## (undated) DEVICE — GAUZE,SPONGE,4"X4",16PLY,XRAY,STRL,LF: Brand: MEDLINE

## (undated) DEVICE — X-RAY DETECTABLE SPONGES,16 PLY: Brand: VISTEC

## (undated) DEVICE — TROCAR: Brand: KII® SLEEVE

## (undated) DEVICE — SYR 10ML LUER LOK 1/5ML GRAD --

## (undated) DEVICE — TUBING PMP L16FT MAIN DISP FOR AR-6400 AR-6475

## (undated) DEVICE — BANDAGE COBAN 6 IN WND 6INX5YD FOAM

## (undated) DEVICE — GLOVE ORANGE PI 8 1/2   MSG9085

## (undated) DEVICE — BANDAGE COMPR W6INXL5YD WHT BGE POLY COT M E WRP WV HK AND

## (undated) DEVICE — GOWN,REINFORCED,POLY,AURORA,XLARGE,STRL: Brand: MEDLINE

## (undated) DEVICE — DRAPE,EXTREMITY,89X128,STERILE: Brand: MEDLINE

## (undated) DEVICE — APPLICATOR MEDICATED 26 CC SOLUTION HI LT ORNG CHLORAPREP

## (undated) DEVICE — SURGICAL PROCEDURE KIT GEN LAPAROSCOPY LF

## (undated) DEVICE — NEEDLE HYPO 25GA L1.5IN BVL ORIENTED ECLIPSE

## (undated) DEVICE — SUTURE VICRYL SZ 2-0 L36IN ABSRB UD L36MM CT-1 1/2 CIR J945H

## (undated) DEVICE — NEEDLE HYPO 18GA L1.5IN PNK POLYPR HUB S STL REG BVL STR

## (undated) DEVICE — TOWEL SURG W17XL27IN STD BLU COT NONFENESTRATED PREWASHED

## (undated) DEVICE — SYR 20ML LL STRL LF --

## (undated) DEVICE — 3M™ TEGADERM™ TRANSPARENT FILM DRESSING FRAME STYLE, 1624W, 2-3/8 IN X 2-3/4 IN (6 CM X 7 CM), 100/CT 4CT/CASE: Brand: 3M™ TEGADERM™

## (undated) DEVICE — SUTURE VCRL SZ 4-0 L27IN ABSRB UD L19MM PS-2 3/8 CIR PRIM J426H

## (undated) DEVICE — SKIN MARKER,REGULAR TIP WITH RULER: Brand: DEVON

## (undated) DEVICE — BIT DRL L110MM DIA3.5MM QUIK CPL W/O STP REUSE